# Patient Record
Sex: MALE | Race: WHITE | NOT HISPANIC OR LATINO | Employment: OTHER | URBAN - METROPOLITAN AREA
[De-identification: names, ages, dates, MRNs, and addresses within clinical notes are randomized per-mention and may not be internally consistent; named-entity substitution may affect disease eponyms.]

---

## 2023-03-13 LAB
ALANINE AMINOTRANSFERASE (SGPT) (U/L) IN SER/PLAS: 60 U/L (ref 10–52)
ALBUMIN (G/DL) IN SER/PLAS: 4.4 G/DL (ref 3.4–5)
ALBUMIN (MG/L) IN URINE: <7 MG/L
ALBUMIN/CREATININE (UG/MG) IN URINE: NORMAL UG/MG CRT (ref 0–30)
ALKALINE PHOSPHATASE (U/L) IN SER/PLAS: 123 U/L (ref 33–120)
ANION GAP IN SER/PLAS: 13 MMOL/L (ref 10–20)
APPEARANCE, URINE: NORMAL
ASPARTATE AMINOTRANSFERASE (SGOT) (U/L) IN SER/PLAS: 35 U/L (ref 9–39)
BASOPHILS (10*3/UL) IN BLOOD BY AUTOMATED COUNT: 0.06 X10E9/L (ref 0–0.1)
BASOPHILS/100 LEUKOCYTES IN BLOOD BY AUTOMATED COUNT: 1 % (ref 0–2)
BILIRUBIN DIRECT (MG/DL) IN SER/PLAS: 0.2 MG/DL (ref 0–0.3)
BILIRUBIN TOTAL (MG/DL) IN SER/PLAS: 0.9 MG/DL (ref 0–1.2)
BILIRUBIN, URINE: NEGATIVE
BLOOD, URINE: NEGATIVE
CALCIDIOL (25 OH VITAMIN D3) (NG/ML) IN SER/PLAS: 45 NG/ML
CALCIUM (MG/DL) IN SER/PLAS: 9.5 MG/DL (ref 8.6–10.6)
CARBON DIOXIDE, TOTAL (MMOL/L) IN SER/PLAS: 29 MMOL/L (ref 21–32)
CHLORIDE (MMOL/L) IN SER/PLAS: 103 MMOL/L (ref 98–107)
CHOLESTEROL (MG/DL) IN SER/PLAS: 178 MG/DL (ref 0–199)
CHOLESTEROL IN HDL (MG/DL) IN SER/PLAS: 66.4 MG/DL
CHOLESTEROL/HDL RATIO: 2.7
COLOR, URINE: YELLOW
CREATININE (MG/DL) IN SER/PLAS: 0.72 MG/DL (ref 0.5–1.3)
CREATININE (MG/DL) IN URINE: 39.2 MG/DL (ref 20–370)
EOSINOPHILS (10*3/UL) IN BLOOD BY AUTOMATED COUNT: 0.17 X10E9/L (ref 0–0.7)
EOSINOPHILS/100 LEUKOCYTES IN BLOOD BY AUTOMATED COUNT: 2.9 % (ref 0–6)
ERYTHROCYTE DISTRIBUTION WIDTH (RATIO) BY AUTOMATED COUNT: 12.3 % (ref 11.5–14.5)
ERYTHROCYTE MEAN CORPUSCULAR HEMOGLOBIN CONCENTRATION (G/DL) BY AUTOMATED: 34 G/DL (ref 32–36)
ERYTHROCYTE MEAN CORPUSCULAR VOLUME (FL) BY AUTOMATED COUNT: 95 FL (ref 80–100)
ERYTHROCYTES (10*6/UL) IN BLOOD BY AUTOMATED COUNT: 4.65 X10E12/L (ref 4.5–5.9)
ESTIMATED AVERAGE GLUCOSE FOR HBA1C: 120 MG/DL
GAMMA GLUTAMYL TRANSFERASE (U/L) IN SER/PLAS: 26 U/L (ref 5–64)
GFR MALE: >90 ML/MIN/1.73M2
GLUCOSE (MG/DL) IN SER/PLAS: 98 MG/DL (ref 74–99)
GLUCOSE, URINE: NEGATIVE MG/DL
HEMATOCRIT (%) IN BLOOD BY AUTOMATED COUNT: 44.1 % (ref 41–52)
HEMOGLOBIN (G/DL) IN BLOOD: 15 G/DL (ref 13.5–17.5)
HEMOGLOBIN A1C/HEMOGLOBIN TOTAL IN BLOOD: 5.8 %
IGE (IU/L) IN SERUM OR PLASMA: 11 IU/ML (ref 0–214)
IMMATURE GRANULOCYTES/100 LEUKOCYTES IN BLOOD BY AUTOMATED COUNT: 0.2 % (ref 0–0.9)
KETONES, URINE: NEGATIVE MG/DL
LACTATE DEHYDROGENASE (U/L) IN SER/PLAS BY LAC->PYR RXN: 157 U/L (ref 84–246)
LDL: 93 MG/DL (ref 0–99)
LEUKOCYTE ESTERASE, URINE: NEGATIVE
LEUKOCYTES (10*3/UL) IN BLOOD BY AUTOMATED COUNT: 5.8 X10E9/L (ref 4.4–11.3)
LYMPHOCYTES (10*3/UL) IN BLOOD BY AUTOMATED COUNT: 2 X10E9/L (ref 1.2–4.8)
LYMPHOCYTES/100 LEUKOCYTES IN BLOOD BY AUTOMATED COUNT: 34.4 % (ref 13–44)
MONOCYTES (10*3/UL) IN BLOOD BY AUTOMATED COUNT: 0.34 X10E9/L (ref 0.1–1)
MONOCYTES/100 LEUKOCYTES IN BLOOD BY AUTOMATED COUNT: 5.8 % (ref 2–10)
NEUTROPHILS (10*3/UL) IN BLOOD BY AUTOMATED COUNT: 3.24 X10E9/L (ref 1.2–7.7)
NEUTROPHILS/100 LEUKOCYTES IN BLOOD BY AUTOMATED COUNT: 55.7 % (ref 40–80)
NITRITE, URINE: NEGATIVE
NRBC (PER 100 WBCS) BY AUTOMATED COUNT: 0 /100 WBC (ref 0–0)
PH, URINE: 8 (ref 5–8)
PLATELETS (10*3/UL) IN BLOOD AUTOMATED COUNT: 196 X10E9/L (ref 150–450)
POTASSIUM (MMOL/L) IN SER/PLAS: 4.2 MMOL/L (ref 3.5–5.3)
PROTEIN TOTAL: 6.9 G/DL (ref 6.4–8.2)
PROTEIN, URINE: NEGATIVE MG/DL
SODIUM (MMOL/L) IN SER/PLAS: 141 MMOL/L (ref 136–145)
SPECIFIC GRAVITY, URINE: 1.01 (ref 1–1.03)
TRIGLYCERIDE (MG/DL) IN SER/PLAS: 93 MG/DL (ref 0–149)
UREA NITROGEN (MG/DL) IN SER/PLAS: 24 MG/DL (ref 6–23)
UROBILINOGEN, URINE: <2 MG/DL (ref 0–1.9)
VLDL: 19 MG/DL (ref 0–40)

## 2023-03-16 LAB — RESPIRATORY CULTURE, CYSTIC FIBROSIS: NORMAL

## 2023-03-17 LAB
VITAMIN A (RETINOL): 0.6 MG/L (ref 0.3–1.2)
VITAMIN A (RETINYL PALMITATE): 0.05 MG/L (ref 0–0.1)
VITAMIN A, INTERPRETATION: NORMAL
VITAMIN E (ALPHA-TOCOPHEROL): 13.5 MG/L (ref 5.5–18)
VITAMIN E (GAMMA-TOCOPHEROL): 1 MG/L (ref 0–6)

## 2023-04-03 LAB
FUNGAL CULTURE/SMEAR: NORMAL
FUNGAL SMEAR: NORMAL

## 2023-06-15 LAB — RESPIRATORY CULTURE, CYSTIC FIBROSIS: NORMAL

## 2023-07-03 LAB
FUNGAL CULTURE/SMEAR: NORMAL
FUNGAL SMEAR: NORMAL

## 2023-10-12 DIAGNOSIS — K21.9 GASTROESOPHAGEAL REFLUX DISEASE, UNSPECIFIED WHETHER ESOPHAGITIS PRESENT: ICD-10-CM

## 2023-10-12 RX ORDER — OMEPRAZOLE 40 MG/1
40 CAPSULE, DELAYED RELEASE ORAL DAILY
Qty: 30 CAPSULE | Refills: 11 | Status: SHIPPED | OUTPATIENT
Start: 2023-10-12 | End: 2023-12-05 | Stop reason: SDUPTHER

## 2023-10-20 ENCOUNTER — SPECIALTY PHARMACY (OUTPATIENT)
Dept: PHARMACY | Facility: CLINIC | Age: 43
End: 2023-10-20

## 2023-10-20 ENCOUNTER — PHARMACY VISIT (OUTPATIENT)
Dept: PHARMACY | Facility: CLINIC | Age: 43
End: 2023-10-20
Payer: MEDICARE

## 2023-10-20 PROCEDURE — RXMED WILLOW AMBULATORY MEDICATION CHARGE

## 2023-10-27 ENCOUNTER — TELEPHONE (OUTPATIENT)
Dept: PEDIATRIC PULMONOLOGY | Facility: HOSPITAL | Age: 43
End: 2023-10-27
Payer: MEDICARE

## 2023-10-27 NOTE — TELEPHONE ENCOUNTER
Moises Chuckieremington would like to get his GT removed.  He hasn't used it in a long while and it's to the point where it is just irritating him (the skin around it cycles from being raw to burning and itching to healing, then back to being raw.  As long as you are okay with that, I can put the referral to Surg to get it removed.

## 2023-10-27 NOTE — TELEPHONE ENCOUNTER
Pulmonary Attending    G-tube removal is reasonable. Froy told me that he only kept it as a nutritional insurance policy after starting Trikafta. His BMI is essentially at goal for adult male with CF and has been stable.  Wait to hear from Dr. Johnson.    Duke Chatman MD  10/27/2023  2:21 PM

## 2023-10-30 NOTE — TELEPHONE ENCOUNTER
Spoke to Moises and re-iterated Dr. Johnson concerns about weight gain and recent use of his GT.  Discussed going without tube feedings until next scheduled appt on 12/6/23 and then she can compare his last week in September to his weight in December.  She could also remove his GT in clinic - no need for surgeon - if that is what would be in his best interest.  Moises verbalized understanding and agreed with plan.

## 2023-10-30 NOTE — TELEPHONE ENCOUNTER
"PASTED FROM SECURE MESSAGE FROM CITLALI    According to my note from June, he was still doing the tube feedings, so at max, it's been 3.5 months that he stopped doing tube feedings. I would say 6 months of no tube feedings and stable weight would be preferred to remove the feeding tube. We do have a September weight for him, which is actually up from his June weight. I would want to know a better time frame of when he actually stopped tube feedings before making any decisions.     PASTED FROM SECURE MESSAGE FROM CECILIO    I just spoke to Moises. The last time he did tube feeds was in September - really only to try to get rid of the supply that he has... I expressed your concern that you would like to see him go 6 months without a GT feed and no weight loss before agreeing that it would be okay to get it removed. He was not very happy with that since he said he talked about getting rid of it at his last appt with Citlali in March, and again, he only does tube feeds mainly because he has so much supply and is trying to get rid if it... He has been doing things to protect his skin that isn't really working.     PASTED FROM SECURE MESSAGE FROM CITLALI  this is from my last note from March \"He wanted to see how things would go with just taking Boost without TF's, but he lost a few pounds and has resumed TFs last week with improvement of weight. He has been using 4 cans Nutren 2.0 per night. Noticed weight dropped down to 145 last week. Prior to that, had not used PEG for about 3 months.\" So he went ok for awhile without using it. Vaseline would be ok to help protect the skin if he hasn't tried that. When is he due to come in again (so we would have another weight at least)? He is also due for colonoscopy unless he got it done elsewhere. Could give the prep through the PEG tube.              11:58  LC  Also I think this is removal in a clinic visit (does not need to see a surgeon) if that is what ends up happeneing   "

## 2023-11-15 ENCOUNTER — PHARMACY VISIT (OUTPATIENT)
Dept: PHARMACY | Facility: CLINIC | Age: 43
End: 2023-11-15
Payer: MEDICARE

## 2023-11-15 PROCEDURE — RXMED WILLOW AMBULATORY MEDICATION CHARGE

## 2023-11-17 ENCOUNTER — SPECIALTY PHARMACY (OUTPATIENT)
Dept: PHARMACY | Facility: CLINIC | Age: 43
End: 2023-11-17

## 2023-12-05 ENCOUNTER — OFFICE VISIT (OUTPATIENT)
Dept: DERMATOLOGY | Facility: CLINIC | Age: 43
End: 2023-12-05
Payer: MEDICARE

## 2023-12-05 DIAGNOSIS — Z12.83 SKIN CANCER SCREENING: Primary | ICD-10-CM

## 2023-12-05 DIAGNOSIS — D48.5 NEOPLASM OF UNCERTAIN BEHAVIOR OF SKIN: ICD-10-CM

## 2023-12-05 DIAGNOSIS — D22.9 NEVUS: ICD-10-CM

## 2023-12-05 DIAGNOSIS — L81.4 LENTIGO: ICD-10-CM

## 2023-12-05 PROBLEM — D68.4: Status: ACTIVE | Noted: 2023-12-05

## 2023-12-05 PROBLEM — E55.9 VITAMIN D DEFICIENCY: Status: ACTIVE | Noted: 2023-12-05

## 2023-12-05 PROBLEM — Z93.1 GASTROSTOMY STATUS (MULTI): Status: ACTIVE | Noted: 2023-12-05

## 2023-12-05 PROBLEM — Z00.00 HEALTHCARE MAINTENANCE: Status: ACTIVE | Noted: 2023-12-05

## 2023-12-05 PROBLEM — R73.02 IMPAIRED GLUCOSE TOLERANCE: Status: ACTIVE | Noted: 2023-12-05

## 2023-12-05 PROBLEM — H90.A22 SENSORINEURAL HEARING LOSS (SNHL) OF LEFT EAR WITH RESTRICTED HEARING OF RIGHT EAR: Status: ACTIVE | Noted: 2023-12-05

## 2023-12-05 PROBLEM — E84.8 EXOCRINE PANCREATIC MANIFESTATION OF CYSTIC FIBROSIS (MULTI): Status: ACTIVE | Noted: 2023-12-05

## 2023-12-05 PROBLEM — M85.80 OSTEOPENIA: Status: ACTIVE | Noted: 2023-12-05

## 2023-12-05 PROBLEM — K21.9 GERD (GASTROESOPHAGEAL REFLUX DISEASE): Status: ACTIVE | Noted: 2023-12-05

## 2023-12-05 PROBLEM — E84.0 CYSTIC FIBROSIS WITH PULMONARY MANIFESTATIONS (MULTI): Status: ACTIVE | Noted: 2023-12-05

## 2023-12-05 PROCEDURE — 88305 TISSUE EXAM BY PATHOLOGIST: CPT | Mod: TC,DER | Performed by: NURSE PRACTITIONER

## 2023-12-05 PROCEDURE — 99203 OFFICE O/P NEW LOW 30 MIN: CPT | Performed by: NURSE PRACTITIONER

## 2023-12-05 PROCEDURE — 88305 TISSUE EXAM BY PATHOLOGIST: CPT | Performed by: DERMATOLOGY

## 2023-12-05 RX ORDER — SODIUM CHLORIDE FOR INHALATION 3 %
4 VIAL, NEBULIZER (ML) INHALATION
COMMUNITY
Start: 2018-09-12 | End: 2023-12-06 | Stop reason: WASHOUT

## 2023-12-05 RX ORDER — PEDI MULTIVIT NO.128/VITAMIN K 500 MCG/ML
2 LIQUID (ML) ORAL DAILY
COMMUNITY

## 2023-12-05 RX ORDER — FLUTICASONE PROPIONATE AND SALMETEROL 250; 50 UG/1; UG/1
1 POWDER RESPIRATORY (INHALATION)
COMMUNITY
Start: 2015-03-04 | End: 2023-12-06 | Stop reason: ALTCHOICE

## 2023-12-05 RX ORDER — PANCRELIPASE LIPASE, PANCRELIPASE PROTEASE, PANCRELIPASE AMYLASE 20000; 63000; 84000 [USP'U]/1; [USP'U]/1; [USP'U]/1
CAPSULE, DELAYED RELEASE ORAL
Start: 2023-12-05 | End: 2024-01-29 | Stop reason: SDUPTHER

## 2023-12-05 RX ORDER — MV-MIN 51/FOLIC ACID/VIT K/UBI 100-350MCG
1 TABLET,CHEWABLE ORAL DAILY
COMMUNITY
Start: 2013-10-12 | End: 2023-12-06 | Stop reason: WASHOUT

## 2023-12-05 RX ORDER — ALBUTEROL SULFATE 90 UG/1
2 AEROSOL, METERED RESPIRATORY (INHALATION) EVERY 6 HOURS PRN
COMMUNITY
Start: 2017-03-08 | End: 2024-03-11 | Stop reason: WASHOUT

## 2023-12-05 RX ORDER — AZITHROMYCIN 250 MG/1
250 TABLET, FILM COATED ORAL DAILY
COMMUNITY
End: 2023-12-06 | Stop reason: SDUPTHER

## 2023-12-05 RX ORDER — OMEPRAZOLE 40 MG/1
1 CAPSULE, DELAYED RELEASE ORAL
COMMUNITY
Start: 2014-12-11 | End: 2024-01-29 | Stop reason: SDUPTHER

## 2023-12-05 RX ORDER — OSELTAMIVIR PHOSPHATE 75 MG/1
75 CAPSULE ORAL EVERY 12 HOURS
COMMUNITY
Start: 2018-11-21 | End: 2023-12-06 | Stop reason: SDUPTHER

## 2023-12-05 NOTE — PROGRESS NOTES
Subjective     Froy Parisi is a 43 y.o. male who presents for the following: waist up skin exam .   New patient visit patient in for waist up skin exam patient states he was seen by his primary and primary noticed moles they would like dermatology to look at further.  Patient states he did have 1 scaly itchy area on his back that he has scratched and has since gone away.  Patient has no other concerns    Review of Systems:  No other skin or systemic complaints other than what is documented elsewhere in the note.    The following portions of the chart were reviewed this encounter and updated as appropriate:       Skin Cancer History  No skin cancer on file.    Specialty Problems    None    Past Medical History:  Froy Parisi  has a past medical history of Influenza due to other identified influenza virus with other respiratory manifestations (11/08/2016) and Other specified respiratory disorders (10/18/2021).    Past Surgical History:  Froy Parisi  has a past surgical history that includes Gastrostomy tube placement (08/15/2018).    Family History:  Patient family history is not on file.    Social History:  Froy Parisi  has no history on file for tobacco use, alcohol use, and drug use.    Allergies:  Patient has no known allergies.    Current Medications / CAM's:    Current Outpatient Medications:     elexacaftor-tezacaftor-ivacaft (Trikafta) 100-50-75 mg tablet, TAKE TWO (2) ORANGE TABLETS BY MOUTH IN THE MORNING AND ONE (1) BLUE TABLET BY MOUTH IN THE EVENING. TAKE 12 HOURS APART WITH FATTY FOODS., Disp: 84 each, Rfl: 11    omeprazole (PriLOSEC) 40 mg DR capsule, Take 1 capsule (40 mg) by mouth once daily. Do not crush or chew., Disp: 30 capsule, Rfl: 11     Objective   Well appearing patient in no apparent distress; mood and affect are within normal limits.    Assessment/Plan   1. Skin cancer screening    The patient presented for a routine skin examination today. There are no specific  concerns regarding skin health and no new or changing moles, lesions, or rashes.     Assessment: Based on the comprehensive skin examination, there were no concerning or abnormal findings. The patient's skin appeared to be in good health, without any notable dermatologic conditions or lesions.    Plan: Given the absence of any significant skin findings, no specific interventions or treatments are warranted at this time. The patient was educated on the importance of regular skin self-examinations and advised to promptly report any changes or concerns. Routine follow-up for a skin examination was recommended.    Suspicious lesions biopsied today.    Discussed/information given on safe sun practices and use of sunscreen, sun protective clothing or sun avoidance. Recommend to use OTC medication of sunscreen SPF 30 or higher on a daily basis prior to sun exposure to reduce the risk of skin cancer.    Contact Office if: Any lesions change in size, shape or color; itch, bum or bleed.        2. Nevus  Multiple benign appearing flesh colored to pigmented macules and papules     Plan: Counseling.  I counseled the patient regarding the following:  Instructions: Monthly self-skin checks to monitor for any changes in moles are recommended. Expectations: Benign Nevi are pigmented nests of cells within the skin.No treatment is necessary. Contact Office if: Any moles change in size, shape or color; itch, bum or bleed.    3. Lentigo  Benign pigmented macules appearing in sun exposed areas     Solar lentigo (a type of lentigo also known as a senile lentigo, age spot, or liver spot) is a benign pigmented macule appearing on fair-skinned individuals that is related to ultraviolet radiation (UVR) exposure, typically from the sun.     PLAN:  Limiting sun exposure through avoidance, protective clothing, and use of sunscreens can help prevent the appearance of solar lentigines.    If lesion changes or becomes symptomatic she should return  to clinic    4. Neoplasm of uncertain behavior of skin  Left Lower Back              Lesion biopsy  Type of biopsy: tangential    Informed consent: discussed and consent obtained    Timeout: patient name, date of birth, surgical site, and procedure verified    Procedure prep:  Patient was prepped and draped  Anesthesia: the lesion was anesthetized in a standard fashion    Anesthetic:  1% lidocaine w/ epinephrine 1-100,000 local infiltration  Instrument used: DermaBlade    Hemostasis achieved with: aluminum chloride    Outcome: patient tolerated procedure well    Post-procedure details: sterile dressing applied and wound care instructions given    Dressing type: petrolatum and bandage      Staff Communication: Dermatology Local Anesthesia: 1 % Lidocaine / Epinephrine - Amount: 2ml    Specimen 1 - Dermatopathology- DERM LAB  Differential Diagnosis: DPN vs. other  Check Margins Yes/No?:    Comments:    Dermpath Lab: Routine Histopathology (formalin-fixed tissue)

## 2023-12-06 ENCOUNTER — MULTIDISCIPLINARY VISIT (OUTPATIENT)
Dept: PEDIATRIC PULMONOLOGY | Facility: HOSPITAL | Age: 43
End: 2023-12-06
Payer: MEDICARE

## 2023-12-06 ENCOUNTER — MULTIDISCIPLINARY VISIT (OUTPATIENT)
Dept: GASTROENTEROLOGY | Facility: HOSPITAL | Age: 43
End: 2023-12-06
Payer: MEDICARE

## 2023-12-06 ENCOUNTER — HOSPITAL ENCOUNTER (OUTPATIENT)
Dept: RESPIRATORY THERAPY | Facility: HOSPITAL | Age: 43
Discharge: HOME | End: 2023-12-06
Payer: MEDICARE

## 2023-12-06 ENCOUNTER — NUTRITION (OUTPATIENT)
Dept: PEDIATRIC PULMONOLOGY | Facility: HOSPITAL | Age: 43
End: 2023-12-06

## 2023-12-06 VITALS
WEIGHT: 148.26 LBS | DIASTOLIC BLOOD PRESSURE: 66 MMHG | RESPIRATION RATE: 20 BRPM | HEIGHT: 69 IN | TEMPERATURE: 98.3 F | SYSTOLIC BLOOD PRESSURE: 103 MMHG | BODY MASS INDEX: 21.96 KG/M2 | HEART RATE: 66 BPM | OXYGEN SATURATION: 95 %

## 2023-12-06 DIAGNOSIS — K94.23: ICD-10-CM

## 2023-12-06 DIAGNOSIS — R63.0 DECREASED APPETITE: Primary | ICD-10-CM

## 2023-12-06 DIAGNOSIS — E84.0 CYSTIC FIBROSIS WITH PULMONARY MANIFESTATIONS (MULTI): Primary | ICD-10-CM

## 2023-12-06 DIAGNOSIS — E84.19 CYSTIC FIBROSIS WITH GASTROINTESTINAL MANIFESTATIONS (MULTI): ICD-10-CM

## 2023-12-06 DIAGNOSIS — Z93.1 GASTROSTOMY STATUS (MULTI): ICD-10-CM

## 2023-12-06 DIAGNOSIS — Z00.00 HEALTHCARE MAINTENANCE: ICD-10-CM

## 2023-12-06 DIAGNOSIS — E84.0 CYSTIC FIBROSIS WITH PULMONARY MANIFESTATIONS (MULTI): ICD-10-CM

## 2023-12-06 DIAGNOSIS — K21.9 GASTROESOPHAGEAL REFLUX DISEASE WITHOUT ESOPHAGITIS: ICD-10-CM

## 2023-12-06 DIAGNOSIS — D22.5 MELANOCYTIC NEVUS OF TRUNK: ICD-10-CM

## 2023-12-06 DIAGNOSIS — E84.8 EXOCRINE PANCREATIC MANIFESTATION OF CYSTIC FIBROSIS (MULTI): ICD-10-CM

## 2023-12-06 DIAGNOSIS — R63.8 DIFFICULTY MAINTAINING WEIGHT: ICD-10-CM

## 2023-12-06 DIAGNOSIS — K86.81 EXOCRINE PANCREATIC INSUFFICIENCY (HHS-HCC): ICD-10-CM

## 2023-12-06 DIAGNOSIS — A49.8 PSEUDOMONAS AERUGINOSA INFECTION: ICD-10-CM

## 2023-12-06 DIAGNOSIS — R73.02 IMPAIRED GLUCOSE TOLERANCE: ICD-10-CM

## 2023-12-06 DIAGNOSIS — Z23 NEED FOR ZOSTER VACCINATION: ICD-10-CM

## 2023-12-06 DIAGNOSIS — R79.89 ABNORMAL LFTS (LIVER FUNCTION TESTS): ICD-10-CM

## 2023-12-06 LAB
FEF 25-75: 2.79 L/S
FEV1/FVC: 73 %
FEV1: 3.53 LITERS
FVC: 4.81 LITERS
PEF: 6.79 L/S

## 2023-12-06 PROCEDURE — 90471 IMMUNIZATION ADMIN: CPT | Performed by: INTERNAL MEDICINE

## 2023-12-06 PROCEDURE — 99213 OFFICE O/P EST LOW 20 MIN: CPT | Performed by: INTERNAL MEDICINE

## 2023-12-06 PROCEDURE — 94010 BREATHING CAPACITY TEST: CPT

## 2023-12-06 PROCEDURE — 94010 BREATHING CAPACITY TEST: CPT | Performed by: INTERNAL MEDICINE

## 2023-12-06 PROCEDURE — 99215 OFFICE O/P EST HI 40 MIN: CPT | Performed by: INTERNAL MEDICINE

## 2023-12-06 PROCEDURE — 87070 CULTURE OTHR SPECIMN AEROBIC: CPT | Performed by: INTERNAL MEDICINE

## 2023-12-06 RX ORDER — OSELTAMIVIR PHOSPHATE 75 MG/1
75 CAPSULE ORAL EVERY 12 HOURS
Qty: 10 CAPSULE | Refills: 0 | Status: SHIPPED | OUTPATIENT
Start: 2023-12-06 | End: 2023-12-11

## 2023-12-06 RX ORDER — BUTALB/ACETAMINOPHEN/CAFFEINE 50-325-40
1 TABLET ORAL DAILY
COMMUNITY
Start: 2023-09-02 | End: 2023-12-06 | Stop reason: SDUPTHER

## 2023-12-06 RX ORDER — BUTALB/ACETAMINOPHEN/CAFFEINE 50-325-40
2 TABLET ORAL DAILY
Qty: 180 TABLET | Refills: 3 | Status: SHIPPED | OUTPATIENT
Start: 2023-12-06 | End: 2024-06-10 | Stop reason: SDUPTHER

## 2023-12-06 RX ORDER — AZITHROMYCIN 250 MG/1
250 TABLET, FILM COATED ORAL DAILY
Qty: 90 TABLET | Refills: 3 | Status: SHIPPED | OUTPATIENT
Start: 2023-12-06 | End: 2024-03-11 | Stop reason: ALTCHOICE

## 2023-12-06 NOTE — PATIENT INSTRUCTIONS
We will order a LEILANI-KEY 24 Fr 2.5 cm from [employer] so you can switch back to the other brand.  We will request additional Boost Plus for you as well.  Today we increased the balloon volume on your current tube to 12 mL total.    We have prescribed the calcium citrate to your pharmacy.

## 2023-12-06 NOTE — ASSESSMENT & PLAN NOTE
DEXA scan (11/28/2022): Lumbar spine Z/T score -2.4 (osteopenia)  Eligible for screening colonoscopy: Possible participation in [facility].  Influenza vaccination: 11/28/2023; has oseltamivir.  SARS CoV-2 vaccination: 3/26/2021, 2/23/2021  Shingrix: First injection given today in clinic; could wait until next 3-month appointment for second injection.

## 2023-12-06 NOTE — PATIENT INSTRUCTIONS
We have talked with Emily Nageotte, the dietitian, about the insurance coverage of Boost nutritional supplements.  If she does not see you today, she will call you back to troubleshoot.  Dr. Johnson will work on getting you the new PEG tube button.  Clarified your medication list to remove the Advair Diskus.  The FEV1 today was 3.53 L (88% predicted).  This is stable to somewhat better than values we saw earlier in the year.  I will let you know if we need to do anything about your throat swab culture results.  I refilled the azithromycin.  I refilled oseltamivir (Tamiflu).  Sick plan: If you have respiratory illness, will consider treatment with ciprofloxacin 750 mg by mouth twice daily for 14 days.  We will see you back in about 3 months to coincide with your follow-up appointment with Dr. Johnson.

## 2023-12-06 NOTE — PROGRESS NOTES
ESTABLISHED CF GI PATIENT NOTE    Froy Parisi  1980   [id number]     Chief Complaint:   Chief Complaint   Patient presents with    Gastrostomy Tube Follow Up    Cystic Fibrosis     Genotype [id number]/1898+1G->A        HPI:   Patient is a 43 y.o. year old male with a PMH significant for cystic fibrosis (genotype  K292gvr/1898+1G->A )    on elexacaftor/tezacaftor/ivacaftor here for discussion regarding gastrostomy tube.  Patient has been wanting to have tube removed due to leaking and has not been using the tube to see if his weight would remain stable off TFs.  Patient was last seen in CF GI clinic 3/12/23.  At that time planned to switch to AMT Mini One longer length (had 24 Fr 2.3 cm LEIALNI-KEY with plan to switch to AMT Mini One 24 Fr 2.5 cm) but had trouble getting the new tube.  Due to the delay, patient did not change out his tube to the AMT Mini One until September, but felt that the AMT was leaking even more than the LEILANI-KEY had.  He had some supplies sent to [place] by accident back in September.      Tried marinol for appetite but it didn't really work.  Also tried mirtazapine for 2 weeks but it made him dizzy.    Eats out a lot when he is staying in [place] because the other people he lives with (girlfriend's brother and mother) end up taking his food.  He has been taking Zenpep 5 with meals, 2-3 with snacks.  Has 1-2 snacks per day.  Eats at [employer], also gets snacks from [employer].    He has noticed that if he misses omeprazole for a day, he has more discomfort with leaking.  He usually has to change out a gauze twice a day due to leaking.  Has not used TFs for awhile due to wanting to see how his weight is.  He currently has 3 boxes of tube feeds but was hoping to have his weight up to 150 pounds.  Has one backup gastrostomy tube at home which is a LEILANI-KEY and he is not sure how old it was.      Past Medical History:  11/08/2016: Influenza due to other identified influenza virus with   other  respiratory manifestations      Comment:  Influenza A  10/18/2021: Other specified respiratory disorders      Comment:  Pseudomonas respiratory infection         Current Outpatient Medications:     albuterol (Ventolin HFA) 90 mcg/actuation inhaler, Inhale 2 puffs every 6 hours if needed., Disp: , Rfl:     azithromycin (Zithromax) 250 mg tablet, Take 1 tablet (250 mg) by mouth once daily., Disp: 90 tablet, Rfl: 3    calcium citrate-vitamin D3 (Citracal+D) 315 mg-5 mcg (200 unit) tablet, Take 2 tablets by mouth once daily., Disp: 180 tablet, Rfl: 3    DEKAs Plus, folic acid, 200 mcg-1,000 mcg-10 mg capsule, Take 2 capsules by mouth once daily., Disp: , Rfl:     elexacaftor-tezacaftor-ivacaft (Trikafta) 100-50-75 mg tablet, TAKE TWO (2) ORANGE TABLETS BY MOUTH IN THE MORNING AND ONE (1) BLUE TABLET BY MOUTH IN THE EVENING. TAKE 12 HOURS APART WITH FATTY FOODS., Disp: 84 each, Rfl: 11    lipase-protease-amylase (Zenpep) 20,000-63,000- 84,000 unit capsule, Take 6 capsules by mouth 3 times a day with meals. May also take 3 capsules with snacks for snacks., Disp: , Rfl:     omeprazole (PriLOSEC) 40 mg DR capsule, Take 1 capsule (40 mg) by mouth once daily in the morning. Take before meals., Disp: , Rfl:     Past Medical History:  Past Medical History:   Diagnosis Date    Influenza due to other identified influenza virus with other respiratory manifestations 11/08/2016    Influenza A    Other specified respiratory disorders 10/18/2021    Pseudomonas respiratory infection       Past Surgical History:  Past Surgical History:   Procedure Laterality Date    GASTROSTOMY TUBE PLACEMENT  08/15/2018    Percutaneous Placement Of Gastrostomy Tube       Family History:  No family history on file.     Vitals      2/21/2022    10:27 AM 5/9/2022     9:43 AM 9/12/2022    12:54 PM 11/28/2022    12:34 PM 3/13/2023    11:09 AM 6/12/2023    10:38 AM 12/6/2023     2:17 PM   Vitals   Systolic 126 119 107 111 122 119 103   Diastolic 76 62 66 72  "86 77 66   Heart Rate 78 62 59 69 70 67 66   Temp 36.8 °C (98.2 °F) 36.7 °C (98.1 °F) 36.8 °C (98.2 °F) 36.2 °C (97.2 °F) 36.9 °C (98.5 °F) 36.8 °C (98.3 °F) 36.8 °C (98.3 °F)   Resp 20 17 18 18 17 18 20   Height (in) 1.751 m (5' 8.94\") 1.751 m (5' 8.94\") 1.751 m (5' 8.94\")  1.751 m (5' 8.94\") 1.751 m (5' 8.94\") 1.75 m (5' 8.9\")   Weight (lb) 153.55 156.97 152.23 149.47 151.35 151.9 148.26   BMI 22.72 kg/m2 23.22 kg/m2 22.52 kg/m2 22.11 kg/m2 22.39 kg/m2 22.47 kg/m2 21.96 kg/m2   BSA (m2) 1.84 m2 1.86 m2 1.83 m2 1.82 m2 1.83 m2 1.83 m2 1.81 m2       Physical Exam:  NAD  Oropharynx clear  Lungs CTA bilaterally  No neck lymphadenopathy  RRR S1 S2  Abd soft, NT ND, multiple well-healed scars  AMT Mini-One 24 Fr 2.5 cm with bumper lightly touching the skin.  Mild erythema 3-4 mm around the site.  Scant drainage on gauze.  Balloon volume checked and found to be 8.5 mL --> increased to 12 Ml total by adding sterile water.  Labs:    WBC   Date/Time Value Ref Range Status   03/13/2023 12:35 PM 5.8 4.4 - 11.3 x10E9/L Final     Hemoglobin   Date/Time Value Ref Range Status   03/13/2023 12:35 PM 15.0 13.5 - 17.5 g/dL Final     Hematocrit   Date/Time Value Ref Range Status   03/13/2023 12:35 PM 44.1 41.0 - 52.0 % Final     MCV   Date/Time Value Ref Range Status   03/13/2023 12:35 PM 95 80 - 100 fL Final     Platelets   Date/Time Value Ref Range Status   03/13/2023 12:35  150 - 450 x10E9/L Final        Sodium   Date/Time Value Ref Range Status   03/13/2023 12:35  136 - 145 mmol/L Final     Potassium   Date/Time Value Ref Range Status   03/13/2023 12:35 PM 4.2 3.5 - 5.3 mmol/L Final     Chloride   Date/Time Value Ref Range Status   03/13/2023 12:35  98 - 107 mmol/L Final     Urea Nitrogen   Date/Time Value Ref Range Status   03/13/2023 12:35 PM 24 (H) 6 - 23 mg/dL Final     Creatinine   Date/Time Value Ref Range Status   03/13/2023 12:35 PM 0.72 0.50 - 1.30 mg/dL Final     Glucose   Date/Time Value Ref Range " Status   03/13/2023 12:35 PM 98 74 - 99 mg/dL Final       AST   Date/Time Value Ref Range Status   03/13/2023 12:35 PM 35 9 - 39 U/L Final     ALT (SGPT)   Date/Time Value Ref Range Status   03/13/2023 12:35 PM 60 (H) 10 - 52 U/L Final     Comment:      Patients treated with Sulfasalazine may generate    falsely decreased results for ALT.       Alkaline Phosphatase   Date/Time Value Ref Range Status   03/13/2023 12:35  (H) 33 - 120 U/L Final     Total Bilirubin   Date/Time Value Ref Range Status   03/13/2023 12:35 PM 0.9 0.0 - 1.2 mg/dL Final     Bilirubin, Direct   Date/Time Value Ref Range Status   03/13/2023 12:35 PM 0.2 0.0 - 0.3 mg/dL Final     GGT   Date/Time Value Ref Range Status   03/13/2023 12:35 PM 26 5 - 64 U/L Final     Albumin   Date/Time Value Ref Range Status   03/13/2023 12:35 PM 4.4 3.4 - 5.0 g/dL Final     Total Protein   Date/Time Value Ref Range Status   03/13/2023 12:35 PM 6.9 6.4 - 8.2 g/dL Final        Protime   Date/Time Value Ref Range Status   09/12/2022 02:40 PM 11.3 9.8 - 13.4 sec Final     INR   Date/Time Value Ref Range Status   09/12/2022 02:40 PM 1.0 0.9 - 1.1 Final        Vitamin D, 25-Hydroxy   Date/Time Value Ref Range Status   03/13/2023 12:35 PM 45 ng/mL Final     Comment:     .  DEFICIENCY:         < 20   NG/ML  INSUFFICIENCY:      20-29  NG/ML  SUFFICIENCY:         NG/ML    THIS ASSAY ACCURATELY QUANTIFIES THE SUM OF  VITAMIN D3, 25-HYDROXY AND VIT D2,25-HYDROXY.          Vitamin D, 25-Hydroxy   Date/Time Value Ref Range Status   03/13/2023 12:35 PM 45 ng/mL Final     Comment:     .  DEFICIENCY:         < 20   NG/ML  INSUFFICIENCY:      20-29  NG/ML  SUFFICIENCY:         NG/ML    THIS ASSAY ACCURATELY QUANTIFIES THE SUM OF  VITAMIN D3, 25-HYDROXY AND VIT D2,25-HYDROXY.       Vitamin A (Retinol)   Date/Time Value Ref Range Status   03/13/2023 12:35 PM 0.60 0.30 - 1.20 mg/L Final     Vitamin A, Interpretation   Date/Time Value Ref Range Status   03/13/2023  12:35 PM Normal  Final     Comment:     This test was developed and its performance characteristics   determined by [facility]. It has not been cleared or   approved by the [place] Food and Drug Administration. This test was   performed in a CLIA certified laboratory and is intended for   clinical purposes.  Performed By: [facility]  [address] [place] [id number]  : Henrry Chapa MD, PhD       Vitamin E (Alpha-Tocopherol)   Date/Time Value Ref Range Status   2023 12:35 PM 13.5 5.5 - 18.0 mg/L Final     Comment:     This test was developed and its performance characteristics   determined by [facility]. It has not been cleared or   approved by the US Food and Drug Administration. This test was   performed in a CLIA certified laboratory and is intended for   clinical purposes.       Vitamin E (Gamma-Tocopherol)   Date/Time Value Ref Range Status   2023 12:35 PM 1.0 0.0 - 6.0 mg/L Final     Comment:     Performed By: [facility]  [address] [place] [id number]  : Henrry Chapa MD, PhD        Ferritin   Date/Time Value Ref Range Status   2022 02:40 PM 63 20 - 300 ug/L Final     Iron   Date/Time Value Ref Range Status   2022 02:40  35 - 150 ug/dL Final     TIBC   Date/Time Value Ref Range Status   2022 02:40  240 - 445 ug/dL Final     Iron Saturation   Date/Time Value Ref Range Status   2022 02:40 PM 28 25 - 45 % Final     14 PANCREATIC ELASTASE,FECAL L 24 ug/g     Imagin11 Gastric emptying study    FINDINGS: The image series shows immediate accumulation of activity   within the stomach fundus with subsequent activity seen in the antrum   and subsequently small bowel. Computer quantification demonstrates   22% retention at one hour (normal < 90% and > 30%), 11% retention at   2 hours (normal < 60%), 5% retention at 3 hours (normal < 30%), and   3% gastric retention at  4 hours (normal < 10% ).     IMPRESSION:   Rapid gastric emptying times with minimal activity remaining in the   gastric antrum at 4 hours.    4/19/21 Abdominal ultrasound with Dopplers  FINDINGS:  LIVER:  The liver is normal in size, 14.8 cm in maximum dimension. The  hepatic parenchyma is homogeneous and demonstrates an expected  echotexture. No focal hepatic lesions are identified.     GALLBLADDER:  The gallbladder is nondistended, and demonstrates no evidence of  gallstones, wall thickening or surrounding fluid. Sonographic  Cui's sign is reportedly negative.     BILIARY SYSTEM:  No evidence of intra or extrahepatic biliary dilatation is  identified; the common bile duct measures 2 mm.     DOPPLER EVALUATION:     HEPATIC ARTERIES:  Hepatic artery and its right and left branches RI's are estimated at  0.71, 0.72 and 0.66, respectively.     PORTAL VEIN:  Portal vein is patent and measures 9 mm. There is normal respiratory  variation. Portal vein velocities are calculated as follows: main  portal vein 30.5 cm/sec, antegrade flow; left portal vein branch 14.9  cm/sec, antegrade flow; right portal vein anterior branch 17.9  cm/sec, antegrade flow; right portal vein posterior branch 24.3  cm/sec, antegrade flow. The splenic vein is also patent.     HEPATIC VEIN:  The right, middle and left hepatic veins are patent and demonstrate  triphasic antegrade flow. IVC appears also patent.     PANCREAS:  The visualized pancreas is unremarkable in appearance.     RIGHT KIDNEY:  The right kidney measures 11.1 cm in length. No hydronephrosis or  renal calculi are seen.     SPLEEN:  The spleen measures 9.8 cm in length and is grossly unremarkable.     PERITONEUM AND RETROPERITONEUM:  There is no free or loculated fluid seen in the abdomen.     IMPRESSION:  Unremarkable ultrasound of the liver including Doppler the evaluation.  Previous Endoscopic Evaluation:  7/12/18 Colonoscopy - BBPS 9; internal hemorrhoids      Assessment  and Plan:  Patient is a 43 y.o. year old male with CF (genotype [id number]/1898+1G->A) on elexacaftor/tezacaftor/ivacaftor, h/o gastrostomy tube, exocrine pancreatic insufficiency here for followup.  Patient has been having issues with gastrostomy tube leaking and has wanted to have tube removed.  Weight today is down 2-3 pounds from June however.     Gastrostomy tube - Discussed with patient his desire for removal of tube.  Patient feels that it would be acceptable to have the tube if it wasn't leaking.  He understands that he is at higher risk for having a gastrocutaneous fistula if it is removed, because he has had a gastrostomy tube since 2007.  On review of prior notes, it appears he periodically uses the tube for feeding if his weight drops.  Will try switching back to the LEILANI-KEY brand tube 24 Fr 2.5 cm.  Increased intragastric balloon volume to 12 mL today to try to reduce leaking.  Backup tube is LEILANI-KEY but likely 2.3 cm (too short).  Decreased appetite - Spoke with dietitian Emily Nageotte - will arrange supplements (Boost Plus) for patient.  Patient's decreased weight is likely multifactorial including contribution from his living situation (concern that household members will take his food).  Exocrine pancreatic insufficiency - Current dose of Zenpep 5 with meals provides  1486 units lipase/kg/meal.  Increased risk CRC in CF - Patient is due for colonoscopy (last one 2018).  Will need to readdress at next visit.  GERD - Continue omeprazole.  Calcium citrate prescribed for supplementation due to acid suppression.  Abnormal LFTs - APRI 0.46 based on March 2023 labs.  Last ultrasound in 2021 showed no evidence of cirrhosis.  Follow up ultrasound was ordered March 2023 but has not been done.  Discuss at followup visit.      Follow up in 3 months.    Aleena Johnson MD, MS  Gastroenterologist

## 2023-12-06 NOTE — PROGRESS NOTES
[facility] M.D. 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 Pre-Trikafta ppFEV1 in mid 60% decile. Post Trikafta ppFEV1 in the mid 80% decile. Last hospitalization and in August 2018. Historically, difficulty maintaining adequate BMI, has feeding tube. Pre-Trikafta BMI 17-19 kg/m². Post Trikafta BMI stable at around 22.5 kg/m². Last Pseudomonas aeruginosa (November 2016). Last MRSA (March 2016). Occasional Aspergillus fumigatus.     CF complications: EPI on PERT (has Relizorb for tube feeding); GERD controlled with omeprazole; suspicion for CF liver disease (Dr. Johnson, March 2023), wanted to get repeat RUQ ultrasound with duplex; osteopenia on DEXA in September 2022; (+) 2-hour OGTT on 3/27/2019 (baseline 97 mg/dL, 2-hour 219) but endocrinology considers him to have impaired glucose tolerance.    HPI (12/6/2023):  Not doing tube feeding.  Tube leaks, would like to get a new button.  Trying to maintain and gain weight using oral supplements but problems obtaining them. No interval need for antibiotics.  Feels like his respiratory status is good. Not doing aerosols or chest physiotherapy.  Generally not coughing or expectorating sputum.  Denies any side effects associated with Trikafta.  Went to dermatology and had shave biopsy of pigmented lesion of lower back on left.    HPI (9/13/2023): First visit with Froy. Heretofore followed with Dr. Bravo, last visit on 6/12/2023. At that point, standard visit. No exacerbation. Doing tube feeding occasionally with Relizorb. Otherwise standard CF health measures. Since that time, he has done well without need for oral antibiotics. His girlfriend (Gauri) has a grandson in [place] with retinoblastoma, so they have been back and forth over past few months to support Gauri's son and daughter-in-law. Froy wonders whether he can/should have the VZV vaccine. He has  also had a spot on skin of back bleed after scratching. Wants MD to take a look.     Current Use of Health Management Strategies:    Respiratory Interventions  Albuterol: Does not use  Pulmozyme: Prescribed, does not use  Hypertonic saline: Does not use    HFCWO (percussive vest): Does not use  Oscillatory PEP: Does not use  Exercise: No Regular Exercise  LTE antagonist: No  Azithromycin: 250 mg daily  Aztreonam lysine ([place]): Does not use  Tobramycin: Does not use    Colistin: Does not use  Meropenem (inhaled): Does not use  Vancomycin (inhaled): Does not use  ICS: Does not use  ICS/LABA: Fluticasone-salmeterol (Advair HFA/Diskus, Breo Ellipta, AirDuo, Wixela Inhub) - often forgets to use  LAMA: No  CFTR modulator: Elexacaftor-Tezacaftor-Ivacaftor (Trikafta) Full-dose (2 orange tablets in AM, 1 blue tablet in PM)  High-dose ibuprofen: No   Oxygen: Does not use  Chronic oral antibiotics: Does not use     Digestive Health Interventions    Acid-suppressing medication?: Yes  Using CF vitamins?: Yes  Taking pancreatic enzymes?: Yes  Any diarrhea?: No  Any steatorrhea?: No  Any constipation?: No  Using laxatives?: No  Feeding tube?: Yes  Supplemental enteral nutrition?: No    Pulmonary Function Test Results    PFT (12/6/2023): FEV1 3.53 L (88%), FVC 4.81 L (96%), FEV1/FVC 73; minimal airflow obstruction  PFT (9/13/2023): FEV1 3.38 L (84%), FVC 4.85 L (97%), FEV1/FVC 70; minimal airflow obstruction.  PFT (6/12/2023): FEV1 3.43 L (85%), FVC 4.79 L (96%), FEV1/FVC 72; normal spirometry  PFT (9/12/2022): FEV1 3.79 L (94%), FVC 4.92 L (98%), FEV1/FVC 77; normal spirometry  PFT (9/11/2018): FEV1 3.06 L (73%), FVC 4.59 L (88%), FEV1/FVC 67; mild airflow obstruction    Current Medications     Current Outpatient Medications   Medication Instructions    albuterol (Ventolin HFA) 90 mcg/actuation inhaler 2 puffs, inhalation, Every 6 hours PRN    azithromycin (ZITHROMAX) 250 mg, oral, Daily    calcium citrate-vitamin D3  "(Citracal+D) 315 mg-5 mcg (200 unit) tablet 2 tablets, oral, Daily    DEKAs Plus, folic acid, 200 mcg-1,000 mcg-10 mg capsule 2 capsules, oral, Daily    elexacaftor-tezacaftor-ivacaft (Trikafta) 100-50-75 mg tablet TAKE TWO (2) ORANGE TABLETS BY MOUTH IN THE MORNING AND ONE (1) BLUE TABLET BY MOUTH IN THE EVENING. TAKE 12 HOURS APART WITH FATTY FOODS.    lipase-protease-amylase (Zenpep) 20,000-63,000- 84,000 unit capsule Take 6 capsules by mouth 3 times a day with meals. May also take 3 capsules with snacks for snacks.    omeprazole (PriLOSEC) 40 mg DR capsule 1 capsule, oral, Daily before breakfast    oseltamivir (TAMIFLU) 75 mg, oral, Every 12 hours       Physical Examination     /66 (BP Location: Right arm, Patient Position: Sitting, BP Cuff Size: Adult)   Pulse 66   Temp 36.8 °C (98.3 °F) (Oral)   Resp 20   Ht 1.75 m (5' 8.9\")   Wt 67.3 kg (148 lb 4.2 oz)   SpO2 95%   BMI 21.96 kg/m²     General: Pleasant man in no distress. Thin but not cachectic. Unaccompanied. Normal vocal character.  HEENT: Good dentition. Unremarkable nasal mucosa.  Neck: Supple, no lymphadenopathy or thyromegaly  Chest: Somewhat distant breath sounds in the upper fields posteriorly, otherwise clear to auscultation bilaterally.  Cardiac: Regular rhythm, no murmur  Abdomen: Soft, nontender, nondistended, Jeff-Key button in place, surgical scars from childhood procedures  Extremities: (+) Mild digital clubbing, no cyanosis or edema  Skin: Multiple nevi.    Metabolic Parameters     Sodium   Date/Time Value Ref Range Status   03/13/2023 12:35  136 - 145 mmol/L Final     Potassium   Date/Time Value Ref Range Status   03/13/2023 12:35 PM 4.2 3.5 - 5.3 mmol/L Final     Chloride   Date/Time Value Ref Range Status   03/13/2023 12:35  98 - 107 mmol/L Final     Bicarbonate   Date/Time Value Ref Range Status   03/13/2023 12:35 PM 29 21 - 32 mmol/L Final     Anion Gap   Date/Time Value Ref Range Status   03/13/2023 12:35 PM 13 10 " - 20 mmol/L Final     Urea Nitrogen   Date/Time Value Ref Range Status   03/13/2023 12:35 PM 24 (H) 6 - 23 mg/dL Final     Creatinine   Date/Time Value Ref Range Status   03/13/2023 12:35 PM 0.72 0.50 - 1.30 mg/dL Final     GFR MALE   Date/Time Value Ref Range Status   03/13/2023 12:35 PM >90 >90 mL/min/1.73m2 Final     Comment:      CALCULATIONS OF ESTIMATED GFR ARE PERFORMED   USING THE 2021 CKD-EPI STUDY REFIT EQUATION   WITHOUT THE RACE VARIABLE FOR THE IDMS-TRACEABLE   CREATININE METHODS.    https://jasn.asnjournals.org/content/early/2021/09/22/ASN.5730572134       Glucose   Date/Time Value Ref Range Status   03/13/2023 12:35 PM 98 74 - 99 mg/dL Final     Calcium   Date/Time Value Ref Range Status   03/13/2023 12:35 PM 9.5 8.6 - 10.6 mg/dL Final       Hematologic Parameters     WBC   Date/Time Value Ref Range Status   03/13/2023 12:35 PM 5.8 4.4 - 11.3 x10E9/L Final     Neutrophils Absolute   Date/Time Value Ref Range Status   03/13/2023 12:35 PM 3.24 1.20 - 7.70 x10E9/L Final     Neutrophils %   Date/Time Value Ref Range Status   03/13/2023 12:35 PM 55.7 40.0 - 80.0 % Final     Lymphocytes %   Date/Time Value Ref Range Status   03/13/2023 12:35 PM 34.4 13.0 - 44.0 % Final     Monocytes %   Date/Time Value Ref Range Status   03/13/2023 12:35 PM 5.8 2.0 - 10.0 % Final     Basophils %   Date/Time Value Ref Range Status   03/13/2023 12:35 PM 1.0 0.0 - 2.0 % Final     Eosinophils Absolute   Date/Time Value Ref Range Status   03/13/2023 12:35 PM 0.17 0.00 - 0.70 x10E9/L Final     Eosinophils %   Date/Time Value Ref Range Status   03/13/2023 12:35 PM 2.9 0.0 - 6.0 % Final     Hemoglobin   Date/Time Value Ref Range Status   03/13/2023 12:35 PM 15.0 13.5 - 17.5 g/dL Final     Hematocrit   Date/Time Value Ref Range Status   03/13/2023 12:35 PM 44.1 41.0 - 52.0 % Final     RBC   Date/Time Value Ref Range Status   03/13/2023 12:35 PM 4.65 4.50 - 5.90 x10E12/L Final     MCV   Date/Time Value Ref Range Status   03/13/2023  12:35 PM 95 80 - 100 fL Final     MCHC   Date/Time Value Ref Range Status   03/13/2023 12:35 PM 34.0 32.0 - 36.0 g/dL Final     Platelets   Date/Time Value Ref Range Status   03/13/2023 12:35  150 - 450 x10E9/L Final       Fat-Soluble Vitamin Levels     Vitamin D, 25-Hydroxy   Date/Time Value Ref Range Status   03/13/2023 12:35 PM 45 ng/mL Final     Comment:     .  DEFICIENCY:         < 20   NG/ML  INSUFFICIENCY:      20-29  NG/ML  SUFFICIENCY:         NG/ML    THIS ASSAY ACCURATELY QUANTIFIES THE SUM OF  VITAMIN D3, 25-HYDROXY AND VIT D2,25-HYDROXY.       Vitamin A (Retinol)   Date/Time Value Ref Range Status   03/13/2023 12:35 PM 0.60 0.30 - 1.20 mg/L Final     Vitamin A, Interpretation   Date/Time Value Ref Range Status   03/13/2023 12:35 PM Normal  Final     Comment:     This test was developed and its performance characteristics   determined by [facility]. It has not been cleared or   approved by the US Food and Drug Administration. This test was   performed in a CLIA certified laboratory and is intended for   clinical purposes.  Performed By: [facility]  [address] [place] [address]  : Henrry Chapa MD, PhD       Vitamin E (Alpha-Tocopherol)   Date/Time Value Ref Range Status   03/13/2023 12:35 PM 13.5 5.5 - 18.0 mg/L Final     Comment:     This test was developed and its performance characteristics   determined by [facility]. It has not been cleared or   approved by the US Food and Drug Administration. This test was   performed in a CLIA certified laboratory and is intended for   clinical purposes.       Vitamin E (Gamma-Tocopherol)   Date/Time Value Ref Range Status   03/13/2023 12:35 PM 1.0 0.0 - 6.0 mg/L Final     Comment:     Performed By: [facility]  [address] [place], [place] [id number]  : Henrry Chapa MD, PhD         LFT and Associated Parameters     AST   Date/Time Value Ref Range Status    03/13/2023 12:35 PM 35 9 - 39 U/L Final     ALT (SGPT)   Date/Time Value Ref Range Status   03/13/2023 12:35 PM 60 (H) 10 - 52 U/L Final     Comment:      Patients treated with Sulfasalazine may generate    falsely decreased results for ALT.       Alkaline Phosphatase   Date/Time Value Ref Range Status   03/13/2023 12:35  (H) 33 - 120 U/L Final     Total Bilirubin   Date/Time Value Ref Range Status   03/13/2023 12:35 PM 0.9 0.0 - 1.2 mg/dL Final     Bilirubin, Direct   Date/Time Value Ref Range Status   03/13/2023 12:35 PM 0.2 0.0 - 0.3 mg/dL Final     GGT   Date/Time Value Ref Range Status   03/13/2023 12:35 PM 26 5 - 64 U/L Final     Albumin   Date/Time Value Ref Range Status   03/13/2023 12:35 PM 4.4 3.4 - 5.0 g/dL Final     Total Protein   Date/Time Value Ref Range Status   03/13/2023 12:35 PM 6.9 6.4 - 8.2 g/dL Final       Coagulation Parameters     Protime   Date/Time Value Ref Range Status   09/12/2022 02:40 PM 11.3 9.8 - 13.4 sec Final     INR   Date/Time Value Ref Range Status   09/12/2022 02:40 PM 1.0 0.9 - 1.1 Final       Diabetes Laboratory Tests     Hemoglobin A1C   Date/Time Value Ref Range Status   03/13/2023 12:35 PM 5.8 (A) % Final     Comment:          Diagnosis of Diabetes-Adults   Non-Diabetic: < or = 5.6%   Increased risk for developing diabetes: 5.7-6.4%   Diagnostic of diabetes: > or = 6.5%  .       Monitoring of Diabetes                Age (y)     Therapeutic Goal (%)   Adults:          >18           <7.0   Pediatrics:    13-18           <7.5                   7-12           <8.0                   0- 6            7.5-8.5   American Diabetes Association. Diabetes Care 33(S1), Jan 2010.       Albumin/Creatine Ratio   Date/Time Value Ref Range Status   03/13/2023 12:35 PM SEE COMMENT 0.0 - 30.0 ug/mg crt Final     Comment:     One or more analytes used in this calculation   is outside of the analytical measurement range.  Calculation cannot be performed.       Glucose tolerance test  Fasting   Date/Time Value Ref Range Status   03/27/2019 11:35 AM 97 <126 mg/dL Final     Glucose tolerance test Two Hour   Date/Time Value Ref Range Status   03/27/2019 11:35  (A) <200 mg/dL Final        Immunology Laboratory Tests     IgE   Date/Time Value Ref Range Status   03/13/2023 12:35 PM 11 0 - 214 IU/mL Final       DEXA Scan     XR BONE density 11/28/2022    Narrative  Name: ANGY QUINONEZ  Patient ID: [id number] YOB: 1980 Height: 69.0 in.  Gender:     Male     Exam Date:  11/28/2022 Weight: 147.0 lbs.  Indications: Cystic Fibrosis, malabsorption    Fractures:    Treatments:  Calcium (E943.0), FLUTICASONE, OMEPRAZOLE, Vitamin D  (E933.5)    LEFT FEMUR - TOTAL  The bone mineral density : 0.926 g/cm2  T-score : -1.2    % of young normal mean :84%  Z-score : -1.0    % of age matched mean : 87%  % change vs. Previous : -0.1%    % change vs. Baseline : -1.8%    LEFT FEMUR - NECK  The bone mineral density : 0.882 g/cm2  T-score : -1.4    % of young normal mean: 82%  Z-score : -1.1    % of age matched mean : 86%  % change vs. Previous : 0.1%    % change vs. Baseline : 0.6%    SPINE L1-L4  The bone mineral density is 0.934 g/cm2  T-score : -2.4   % of young normal mean is 77%  Z-score : -2.4    % of age matched mean is 77%  % change vs. Previous : -2.4%    % change vs. Baseline : 0.1%      World Health Organization (WHO) criteria for post-menopausal,   Women:  Normal:       T-score at or above -1 SD  Osteopenia:   T-score between -1 and -2.5 SD  Osteoporosis: T-score at or below -2.5 SD    10-Year Fracture Risk:  Major Osteoporotic Fracture N/A  Hip Fracture                N/A  Reported Risk Factors       None    Interpretation:  According to World Health Organization criteria, classification is low bone mass.  Followup recommended on November 2024 or sooner as clinically warranted.       Chest Radiograph     XR chest 2 view 11/28/2022    Narrative  MRN: [id number]  Patient Name:  "ANGY QUINONEZ    STUDY:  TH CHEST 2 VIEW PA AND LAT;    INDICATION:  CF  R63.6: Underweight.    COMPARISON:  09/26/2018.    ACCESSION NUMBER(S):  [id number]    ORDERING CLINICIAN:  GABY LOPEZ    FINDINGS:  The cardiac silhouette size is within normal limits.  There is no focal consolidation, edema or pneumothorax.  No sizeable pleural effusion.    Impression  No acute cardiopulmonary process.       CF Sputum Culture     No results found for: \"AFBCX\"     Respiratory Culture, Cystic Fibrosis   Date/Time Value Ref Range Status   09/13/2023 12:05 PM 1+ NORMAL THROAT ADRIEL. (A)  Final   09/13/2023 12:05 PM Pseudomonas (A)  Final     Comment:     2+~FURTHER IDENTIFIED AS PSEUDOMONAS FULVA   06/12/2023 12:05 PM 2+ NORMAL THROAT ADRIEL.  Final   03/10/2023 12:39 PM 2+ NORMAL THROAT ADRIEL.  Final       Susceptibility data from last 3000 days.  Collected Specimen Info Organism Amikacin Aztreonam Cefepime Ceftazidime Ciprofloxacin Meropenem Piperacillin/Tazobactam Tobramycin   09/13/23 Respiratory Pseudomonas S I S S S S I S       Problem List Items Addressed This Visit       Cystic fibrosis with pulmonary manifestations (CMS/HCC) - Primary    Current Assessment & Plan     Stable lung function. No exacerbation present.  Continue Trikafta  Not doing aerosols or chest physiotherapy.  Surveillance throat swab culture  Sick plan: ciprofloxacin 750 mg by mouth twice daily for 14 days.         Relevant Medications    elexacaftor-tezacaftor-ivacaft (Trikafta) 100-50-75 mg tablet    Other Relevant Orders    Respiratory Culture, Cystic Fibrosis    Difficulty maintaining weight    Current Assessment & Plan     Problems obtaining nutritional supplements for oral administration in lieu of tube feeding. To speak with CF dietitian, Emily Nageotte.         Exocrine pancreatic manifestation of cystic fibrosis (CMS/HCC)    Current Assessment & Plan     Continue PERT.         Relevant Medications    lipase-protease-amylase (Zenpep) " 20,000-63,000- 84,000 unit capsule    Gastrostomy status (CMS/[facility])    Current Assessment & Plan     Froy met with Dr. Johnson today. Plan to get another type of PEG tube button (LEILANI-KEY 24 Fr 2.5 cm) from [facility].  Froy would still like to get rid of the feeding tube (because it leaks) unless he needs to use it to maintain weight.          GERD (gastroesophageal reflux disease)    Current Assessment & Plan     Controlled on omeprazole 40 mg daily          Healthcare maintenance    Current Assessment & Plan     DEXA scan (11/28/2022): Lumbar spine Z/T score -2.4 (osteopenia)  Eligible for screening colonoscopy: Possible participation in [facility].  Influenza vaccination: 11/28/2023; has oseltamivir.  SARS CoV-2 vaccination: 3/26/2021, 2/23/2021  Shingrix: First injection given today in clinic; could wait until next 3-month appointment for second injection.         Relevant Medications    oseltamivir (Tamiflu) 75 mg capsule    Impaired glucose tolerance    Current Assessment & Plan     Froy met with Dr. Schwarz on 9/13/2023. Plan to monitor FSG. No insulins.          Melanocytic nevus of trunk    Overview     Shave Biopsy (12/7/2023): MELANOCYTIC NEVUS WITH MILD JUNCTIONAL ARCHITECTURAL DISORDER AND ADJACENT DERMAL MELANOCYTIC NEVUS (SEE COMMENT)         Current Assessment & Plan     Dermatology following annually.         Need for zoster vaccination    Relevant Orders    Zoster vaccine, recombinant, adult (SHINGRIX) (Completed)    Pseudomonas aeruginosa infection    Current Assessment & Plan     Positive culture from 9/13/2023  Given clinical stability and patient preferences, will forego inhaled antibiotics. May revisit this notion if exacerbation, increase in daily symptom burden, and/or decline in lung function despite Trikafta.  Should be taking azithromycin 250 mg daily         Relevant Medications    azithromycin (Zithromax) 250 mg tablet       Follow-up:  Visit date not found; did not make  appointment before leaving office today; 3 months.    Duke Chatman MD   12/06/2023

## 2023-12-07 LAB
LABORATORY COMMENT REPORT: NORMAL
PATH REPORT.FINAL DX SPEC: NORMAL
PATH REPORT.GROSS SPEC: NORMAL
PATH REPORT.MICROSCOPIC SPEC OTHER STN: NORMAL
PATH REPORT.RELEVANT HX SPEC: NORMAL
PATH REPORT.TOTAL CANCER: NORMAL

## 2023-12-07 NOTE — PROGRESS NOTES
Pt. Is here alone in clinic today.     Pt. Reports that he has been having difficulty getting supplements. He also reports that he needs a different G-Tube size for concerns regarding leaking. GI Physician recommended switching to Jeff-key 24 Fr 2.5 cm sized tube. Pt. Would like to trial stopping tube feedings to potentially have G-Tube removed. Pt. Was okay having back up supply of tube feedings so if he does start losing wt., he will be able to restart feedings. Pt. Would still like to receive Boost Plus supplements, which is appropriate given current BMI status. Pt. Had no further concerns at this time. RD provided contact info and encouraged pt. To reach out as nutritional concerns arise. Pt. Was agreeable.        Impression:   At risk BMI    Recommendations:  Recommend continue drinking Boost Plus BID  Recommend continuing flushing G-Tube daily  Recommend reach out to RD as nutritional concerns arise

## 2023-12-08 PROBLEM — D22.5 MELANOCYTIC NEVUS OF TRUNK: Status: ACTIVE | Noted: 2023-12-08

## 2023-12-08 PROBLEM — Z23 NEED FOR ZOSTER VACCINATION: Status: ACTIVE | Noted: 2023-12-08

## 2023-12-08 PROBLEM — A49.8 PSEUDOMONAS AERUGINOSA INFECTION: Status: ACTIVE | Noted: 2023-12-08

## 2023-12-08 PROBLEM — R63.8 DIFFICULTY MAINTAINING WEIGHT: Status: ACTIVE | Noted: 2023-12-08

## 2023-12-08 NOTE — ASSESSMENT & PLAN NOTE
Problems obtaining nutritional supplements for oral administration in lieu of tube feeding. To speak with CF dietitian, Emily Nageotte.

## 2023-12-08 NOTE — ASSESSMENT & PLAN NOTE
Froy met with Dr. Johnson today. Plan to get another type of PEG tube button (LEILANI-KEY 24 Fr 2.5 cm) from [facility].  Froy would still like to get rid of the feeding tube (because it leaks) unless he needs to use it to maintain weight.

## 2023-12-08 NOTE — ASSESSMENT & PLAN NOTE
Positive culture from 9/13/2023  Given clinical stability and patient preferences, will forego inhaled antibiotics. May revisit this notion if exacerbation, increase in daily symptom burden, and/or decline in lung function despite Trikafta.  Should be taking azithromycin 250 mg daily

## 2023-12-08 NOTE — ASSESSMENT & PLAN NOTE
Stable lung function. No exacerbation present.  Continue Trikafta  Not doing aerosols or chest physiotherapy.  Surveillance throat swab culture  Sick plan: ciprofloxacin 750 mg by mouth twice daily for 14 days.

## 2023-12-09 LAB — BACTERIA SPT CF RESP CULT: NORMAL

## 2023-12-18 ENCOUNTER — SPECIALTY PHARMACY (OUTPATIENT)
Dept: PHARMACY | Facility: CLINIC | Age: 43
End: 2023-12-18

## 2023-12-18 RX ORDER — ELEXACAFTOR, TEZACAFTOR, AND IVACAFTOR 100-50-75
KIT ORAL
Qty: 84 EACH | Refills: 11 | Status: CANCELLED | OUTPATIENT
Start: 2023-12-18 | End: 2024-12-16

## 2023-12-20 RX ORDER — ELEXACAFTOR, TEZACAFTOR, AND IVACAFTOR 100-50-75
KIT ORAL
Qty: 84 EACH | Refills: 11 | Status: CANCELLED | OUTPATIENT
Start: 2023-12-18 | End: 2024-12-16

## 2023-12-22 RX ORDER — ELEXACAFTOR, TEZACAFTOR, AND IVACAFTOR 100-50-75
KIT ORAL
Qty: 84 EACH | Refills: 11 | Status: CANCELLED | OUTPATIENT
Start: 2023-12-18 | End: 2024-12-16

## 2023-12-27 RX ORDER — ELEXACAFTOR, TEZACAFTOR, AND IVACAFTOR 100-50-75
KIT ORAL
Qty: 84 EACH | Refills: 11 | Status: CANCELLED | OUTPATIENT
Start: 2023-12-18 | End: 2024-12-16

## 2023-12-29 DIAGNOSIS — E84.0 CYSTIC FIBROSIS WITH PULMONARY MANIFESTATIONS (MULTI): ICD-10-CM

## 2024-01-02 RX ORDER — ELEXACAFTOR, TEZACAFTOR, AND IVACAFTOR 100-50-75
KIT ORAL
Qty: 84 EACH | Refills: 11 | OUTPATIENT
Start: 2024-01-02 | End: 2024-12-31

## 2024-01-02 NOTE — TELEPHONE ENCOUNTER
Pulmonary Attending  Cystic Fibrosis Service    Requested Prescriptions     Refused Prescriptions Disp Refills    elexacaftor-tezacaftor-ivacaft (Trikafta) 100-50-75 mg tablet 84 each 11     Sig: TAKE TWO (2) ORANGE TABLETS BY MOUTH IN THE MORNING AND ONE (1) BLUE TABLET BY MOUTH IN THE EVENING. TAKE 12 HOURS APART WITH FATTY FOODS.     Refused By: DUKE CHATMAN     Reason for Refusal: Refill not appropriate         [id number] - [address] [address]  Phone: [phone] Fax: [phone]      Duke Chatman MD  1/2/2024  9:18 AM

## 2024-01-03 RX ORDER — ELEXACAFTOR, TEZACAFTOR, AND IVACAFTOR 100-50-75
KIT ORAL
Qty: 84 EACH | Refills: 11 | Status: CANCELLED | OUTPATIENT
Start: 2024-01-03 | End: 2025-01-01

## 2024-01-05 DIAGNOSIS — E84.0 CYSTIC FIBROSIS WITH PULMONARY MANIFESTATIONS (MULTI): ICD-10-CM

## 2024-01-05 RX ORDER — ELEXACAFTOR, TEZACAFTOR, AND IVACAFTOR 100-50-75
KIT ORAL
Qty: 84 EACH | Refills: 11 | Status: CANCELLED | OUTPATIENT
Start: 2024-01-03 | End: 2025-01-01

## 2024-01-05 NOTE — TELEPHONE ENCOUNTER
Pulmonary Attending  Cystic Fibrosis Service    Requested Prescriptions     Signed Prescriptions Disp Refills    elexacaftor-tezacaftor-ivacaft (Trikafta) 100-50-75 mg tablet 84 each 11     Sig: TAKE TWO (2) ORANGE TABLETS BY MOUTH IN THE MORNING AND ONE (1) BLUE TABLET BY MOUTH IN THE EVENING. TAKE 12 HOURS APART WITH FATTY FOODS.     Authorizing Provider: DUKE CHATMAN      [facility]  [address] [place], [place] [address]  Phone: [phone] Fax: [phone]      Duke Chatman MD  1/5/2024  11:08 AM

## 2024-01-08 PROCEDURE — RXMED WILLOW AMBULATORY MEDICATION CHARGE

## 2024-01-10 ENCOUNTER — PHARMACY VISIT (OUTPATIENT)
Dept: PHARMACY | Facility: CLINIC | Age: 44
End: 2024-01-10
Payer: COMMERCIAL

## 2024-01-16 ENCOUNTER — TELEPHONE (OUTPATIENT)
Dept: PEDIATRIC PULMONOLOGY | Facility: HOSPITAL | Age: 44
End: 2024-01-16
Payer: MEDICARE

## 2024-01-16 ENCOUNTER — SPECIALTY PHARMACY (OUTPATIENT)
Dept: PHARMACY | Facility: CLINIC | Age: 44
End: 2024-01-16

## 2024-01-16 VITALS — WEIGHT: 151.4 LBS | BODY MASS INDEX: 22.42 KG/M2

## 2024-01-16 NOTE — TELEPHONE ENCOUNTER
RD called pt. To check in. Pt. Is currently in [place].    Pt. Has not been doing tube feedings. He reports that his wt. Has been stable. He does Ensure Plus BID orally. He was switched from Boost because there was a supply issues, but he really likes the Ensure and would like to stay on that. RD told pt. To tell [employer] that he prefers Ensure to keep getting that instead of the Boost. Pt. Was agreeable.     Pt. Received new sized g-tube. He reports that this new tube has been leaking less. He reports that he has not been flushing the tube regularly. RD recommended flushing the tube with water daily to prevent clogging. Pt. Stated that he understood and was agreeable.     Pt. Had no further concerns at this time. RD encouraged pt. To reach out as nutritional concerns arise. Pt. Was agreeable.

## 2024-01-29 DIAGNOSIS — E84.8 EXOCRINE PANCREATIC MANIFESTATION OF CYSTIC FIBROSIS (MULTI): ICD-10-CM

## 2024-01-29 DIAGNOSIS — E84.8 PANCREATIC INSUFFICIENCY DUE TO CYSTIC FIBROSIS (MULTI): ICD-10-CM

## 2024-01-29 DIAGNOSIS — K86.89 PANCREATIC INSUFFICIENCY DUE TO CYSTIC FIBROSIS (MULTI): ICD-10-CM

## 2024-01-29 RX ORDER — OMEPRAZOLE 40 MG/1
40 CAPSULE, DELAYED RELEASE ORAL
Qty: 90 CAPSULE | Refills: 3 | Status: SHIPPED | OUTPATIENT
Start: 2024-01-29 | End: 2025-01-28

## 2024-01-29 RX ORDER — PANCRELIPASE LIPASE, PANCRELIPASE PROTEASE, PANCRELIPASE AMYLASE 20000; 63000; 84000 [USP'U]/1; [USP'U]/1; [USP'U]/1
CAPSULE, DELAYED RELEASE ORAL
Qty: 540 CAPSULE | Refills: 11 | Status: SHIPPED | OUTPATIENT
Start: 2024-01-29 | End: 2025-01-28

## 2024-01-29 NOTE — PROGRESS NOTES
Pulmonary Attending  Cystic Fibrosis Service    Requested Prescriptions     Signed Prescriptions Disp Refills    omeprazole (PriLOSEC) 40 mg DR capsule 90 capsule 3     Sig: Take 1 capsule (40 mg) by mouth once daily in the morning. Take before meals.    lipase-protease-amylase (Zenpep) 20,000-63,000- 84,000 unit capsule 540 capsule 11     Sig: Take 6 capsules by mouth 3 times a day with meals. May also take 3 capsules with snacks for snacks.       [id number] - [address] [address]  Phone: [phone] Fax: [phone]    Duke Chatman MD  1/29/2024  12:54 PM

## 2024-02-01 ENCOUNTER — SPECIALTY PHARMACY (OUTPATIENT)
Dept: PHARMACY | Facility: CLINIC | Age: 44
End: 2024-02-01

## 2024-02-01 PROCEDURE — RXMED WILLOW AMBULATORY MEDICATION CHARGE

## 2024-02-02 ENCOUNTER — PHARMACY VISIT (OUTPATIENT)
Dept: PHARMACY | Facility: CLINIC | Age: 44
End: 2024-02-02
Payer: COMMERCIAL

## 2024-02-28 ENCOUNTER — SPECIALTY PHARMACY (OUTPATIENT)
Dept: PHARMACY | Facility: CLINIC | Age: 44
End: 2024-02-28

## 2024-02-28 PROCEDURE — RXMED WILLOW AMBULATORY MEDICATION CHARGE

## 2024-03-01 ENCOUNTER — PHARMACY VISIT (OUTPATIENT)
Dept: PHARMACY | Facility: CLINIC | Age: 44
End: 2024-03-01
Payer: COMMERCIAL

## 2024-03-11 ENCOUNTER — DOCUMENTATION (OUTPATIENT)
Dept: PEDIATRIC PULMONOLOGY | Facility: HOSPITAL | Age: 44
End: 2024-03-11

## 2024-03-11 ENCOUNTER — OFFICE VISIT (OUTPATIENT)
Dept: GASTROENTEROLOGY | Facility: HOSPITAL | Age: 44
End: 2024-03-11
Payer: MEDICARE

## 2024-03-11 ENCOUNTER — NUTRITION (OUTPATIENT)
Dept: PEDIATRIC PULMONOLOGY | Facility: HOSPITAL | Age: 44
End: 2024-03-11

## 2024-03-11 ENCOUNTER — OFFICE VISIT (OUTPATIENT)
Dept: PEDIATRIC PULMONOLOGY | Facility: HOSPITAL | Age: 44
End: 2024-03-11
Payer: MEDICARE

## 2024-03-11 ENCOUNTER — HOSPITAL ENCOUNTER (OUTPATIENT)
Dept: RESPIRATORY THERAPY | Facility: HOSPITAL | Age: 44
Discharge: HOME | End: 2024-03-11
Payer: MEDICARE

## 2024-03-11 ENCOUNTER — APPOINTMENT (OUTPATIENT)
Dept: RESPIRATORY THERAPY | Facility: HOSPITAL | Age: 44
End: 2024-03-11
Payer: MEDICARE

## 2024-03-11 ENCOUNTER — SPECIALTY PHARMACY (OUTPATIENT)
Dept: PHARMACY | Facility: CLINIC | Age: 44
End: 2024-03-11

## 2024-03-11 VITALS
SYSTOLIC BLOOD PRESSURE: 112 MMHG | TEMPERATURE: 97.6 F | HEIGHT: 69 IN | DIASTOLIC BLOOD PRESSURE: 70 MMHG | RESPIRATION RATE: 18 BRPM | BODY MASS INDEX: 22.4 KG/M2 | OXYGEN SATURATION: 97 % | WEIGHT: 151.24 LBS | HEART RATE: 78 BPM

## 2024-03-11 DIAGNOSIS — Z00.00 HEALTHCARE MAINTENANCE: ICD-10-CM

## 2024-03-11 DIAGNOSIS — E84.9 CF (CYSTIC FIBROSIS) (MULTI): ICD-10-CM

## 2024-03-11 DIAGNOSIS — E84.19 CYSTIC FIBROSIS WITH GASTROINTESTINAL MANIFESTATIONS (MULTI): Primary | ICD-10-CM

## 2024-03-11 DIAGNOSIS — R73.02 IMPAIRED GLUCOSE TOLERANCE: Primary | ICD-10-CM

## 2024-03-11 DIAGNOSIS — K86.81 EXOCRINE PANCREATIC INSUFFICIENCY (HHS-HCC): ICD-10-CM

## 2024-03-11 DIAGNOSIS — E13.8: ICD-10-CM

## 2024-03-11 DIAGNOSIS — M85.80 OSTEOPENIA, UNSPECIFIED LOCATION: ICD-10-CM

## 2024-03-11 DIAGNOSIS — R79.89 ABNORMAL LFTS (LIVER FUNCTION TESTS): ICD-10-CM

## 2024-03-11 DIAGNOSIS — Z12.11 COLON CANCER SCREENING: ICD-10-CM

## 2024-03-11 DIAGNOSIS — E84.9 CYSTIC FIBROSIS (MULTI): ICD-10-CM

## 2024-03-11 DIAGNOSIS — A49.8 PSEUDOMONAS AERUGINOSA INFECTION: ICD-10-CM

## 2024-03-11 PROBLEM — E08.9 DIABETES MELLITUS RELATED TO CF (CYSTIC FIBROSIS) (MULTI): Status: ACTIVE | Noted: 2024-03-11

## 2024-03-11 PROBLEM — E84.8 DIABETES MELLITUS RELATED TO CF (CYSTIC FIBROSIS) (MULTI): Status: ACTIVE | Noted: 2024-03-11

## 2024-03-11 LAB
25(OH)D3 SERPL-MCNC: 46 NG/ML (ref 30–100)
ALBUMIN SERPL BCP-MCNC: 4.3 G/DL (ref 3.4–5)
ALP SERPL-CCNC: 103 U/L (ref 33–120)
ALT SERPL W P-5'-P-CCNC: 43 U/L (ref 10–52)
ANION GAP SERPL CALC-SCNC: 14 MMOL/L (ref 10–20)
APPEARANCE UR: CLEAR
AST SERPL W P-5'-P-CCNC: 28 U/L (ref 9–39)
BASOPHILS # BLD AUTO: 0.07 X10*3/UL (ref 0–0.1)
BASOPHILS NFR BLD AUTO: 1.4 %
BILIRUB DIRECT SERPL-MCNC: 0.1 MG/DL (ref 0–0.3)
BILIRUB SERPL-MCNC: 0.8 MG/DL (ref 0–1.2)
BILIRUB UR STRIP.AUTO-MCNC: NEGATIVE MG/DL
BUN SERPL-MCNC: 17 MG/DL (ref 6–23)
CALCIUM SERPL-MCNC: 9.5 MG/DL (ref 8.6–10.6)
CHLORIDE SERPL-SCNC: 105 MMOL/L (ref 98–107)
CHOLEST SERPL-MCNC: 155 MG/DL (ref 0–199)
CHOLESTEROL/HDL RATIO: 2.7
CO2 SERPL-SCNC: 28 MMOL/L (ref 21–32)
COLOR UR: YELLOW
CREAT SERPL-MCNC: 0.81 MG/DL (ref 0.5–1.3)
EGFRCR SERPLBLD CKD-EPI 2021: >90 ML/MIN/1.73M*2
EOSINOPHIL # BLD AUTO: 0.27 X10*3/UL (ref 0–0.7)
EOSINOPHIL NFR BLD AUTO: 5.3 %
ERYTHROCYTE [DISTWIDTH] IN BLOOD BY AUTOMATED COUNT: 13 % (ref 11.5–14.5)
EST. AVERAGE GLUCOSE BLD GHB EST-MCNC: 111 MG/DL
GGT SERPL-CCNC: 16 U/L (ref 5–64)
GLUCOSE SERPL-MCNC: 119 MG/DL (ref 74–99)
GLUCOSE UR STRIP.AUTO-MCNC: NORMAL MG/DL
HBA1C MFR BLD: 5.5 %
HCT VFR BLD AUTO: 45.3 % (ref 41–52)
HDLC SERPL-MCNC: 57.7 MG/DL
HGB BLD-MCNC: 14.5 G/DL (ref 13.5–17.5)
IGE SERPL-ACNC: 6 IU/ML (ref 0–214)
IMM GRANULOCYTES # BLD AUTO: 0.01 X10*3/UL (ref 0–0.7)
IMM GRANULOCYTES NFR BLD AUTO: 0.2 % (ref 0–0.9)
KETONES UR STRIP.AUTO-MCNC: NEGATIVE MG/DL
LDLC SERPL CALC-MCNC: 72 MG/DL
LEUKOCYTE ESTERASE UR QL STRIP.AUTO: NEGATIVE
LYMPHOCYTES # BLD AUTO: 1.77 X10*3/UL (ref 1.2–4.8)
LYMPHOCYTES NFR BLD AUTO: 34.8 %
MCH RBC QN AUTO: 31 PG (ref 26–34)
MCHC RBC AUTO-ENTMCNC: 32 G/DL (ref 32–36)
MCV RBC AUTO: 97 FL (ref 80–100)
MONOCYTES # BLD AUTO: 0.32 X10*3/UL (ref 0.1–1)
MONOCYTES NFR BLD AUTO: 6.3 %
NEUTROPHILS # BLD AUTO: 2.65 X10*3/UL (ref 1.2–7.7)
NEUTROPHILS NFR BLD AUTO: 52 %
NITRITE UR QL STRIP.AUTO: NEGATIVE
NON HDL CHOLESTEROL: 97 MG/DL (ref 0–149)
NRBC BLD-RTO: 0 /100 WBCS (ref 0–0)
PH UR STRIP.AUTO: 5.5 [PH]
PHOSPHATE SERPL-MCNC: 3.3 MG/DL (ref 2.5–4.9)
PLATELET # BLD AUTO: 186 X10*3/UL (ref 150–450)
POTASSIUM SERPL-SCNC: 4.4 MMOL/L (ref 3.5–5.3)
PROT SERPL-MCNC: 6.4 G/DL (ref 6.4–8.2)
PROT UR STRIP.AUTO-MCNC: NEGATIVE MG/DL
RBC # BLD AUTO: 4.67 X10*6/UL (ref 4.5–5.9)
RBC # UR STRIP.AUTO: NEGATIVE /UL
SODIUM SERPL-SCNC: 143 MMOL/L (ref 136–145)
SP GR UR STRIP.AUTO: 1.01
TRIGL SERPL-MCNC: 128 MG/DL (ref 0–149)
UROBILINOGEN UR STRIP.AUTO-MCNC: NORMAL MG/DL
VLDL: 26 MG/DL (ref 0–40)
WBC # BLD AUTO: 5.1 X10*3/UL (ref 4.4–11.3)

## 2024-03-11 PROCEDURE — 99215 OFFICE O/P EST HI 40 MIN: CPT | Performed by: INTERNAL MEDICINE

## 2024-03-11 PROCEDURE — 84446 ASSAY OF VITAMIN E: CPT | Performed by: INTERNAL MEDICINE

## 2024-03-11 PROCEDURE — 84100 ASSAY OF PHOSPHORUS: CPT | Performed by: INTERNAL MEDICINE

## 2024-03-11 PROCEDURE — 94010 BREATHING CAPACITY TEST: CPT | Performed by: INTERNAL MEDICINE

## 2024-03-11 PROCEDURE — 80048 BASIC METABOLIC PNL TOTAL CA: CPT | Performed by: INTERNAL MEDICINE

## 2024-03-11 PROCEDURE — 80061 LIPID PANEL: CPT | Performed by: INTERNAL MEDICINE

## 2024-03-11 PROCEDURE — 90750 HZV VACC RECOMBINANT IM: CPT | Performed by: INTERNAL MEDICINE

## 2024-03-11 PROCEDURE — 83036 HEMOGLOBIN GLYCOSYLATED A1C: CPT | Performed by: INTERNAL MEDICINE

## 2024-03-11 PROCEDURE — 82977 ASSAY OF GGT: CPT | Performed by: INTERNAL MEDICINE

## 2024-03-11 PROCEDURE — 85025 COMPLETE CBC W/AUTO DIFF WBC: CPT | Performed by: INTERNAL MEDICINE

## 2024-03-11 PROCEDURE — 36415 COLL VENOUS BLD VENIPUNCTURE: CPT | Performed by: INTERNAL MEDICINE

## 2024-03-11 PROCEDURE — 99213 OFFICE O/P EST LOW 20 MIN: CPT | Performed by: INTERNAL MEDICINE

## 2024-03-11 PROCEDURE — 3048F LDL-C <100 MG/DL: CPT | Performed by: INTERNAL MEDICINE

## 2024-03-11 PROCEDURE — 87070 CULTURE OTHR SPECIMN AEROBIC: CPT | Performed by: INTERNAL MEDICINE

## 2024-03-11 PROCEDURE — 3074F SYST BP LT 130 MM HG: CPT | Performed by: INTERNAL MEDICINE

## 2024-03-11 PROCEDURE — 3044F HG A1C LEVEL LT 7.0%: CPT | Performed by: INTERNAL MEDICINE

## 2024-03-11 PROCEDURE — 82785 ASSAY OF IGE: CPT | Performed by: INTERNAL MEDICINE

## 2024-03-11 PROCEDURE — 94010 BREATHING CAPACITY TEST: CPT

## 2024-03-11 PROCEDURE — 82306 VITAMIN D 25 HYDROXY: CPT | Performed by: INTERNAL MEDICINE

## 2024-03-11 PROCEDURE — 99213 OFFICE O/P EST LOW 20 MIN: CPT | Mod: 25 | Performed by: INTERNAL MEDICINE

## 2024-03-11 PROCEDURE — 84075 ASSAY ALKALINE PHOSPHATASE: CPT | Performed by: INTERNAL MEDICINE

## 2024-03-11 PROCEDURE — G2212 PROLONG OUTPT/OFFICE VIS: HCPCS | Performed by: INTERNAL MEDICINE

## 2024-03-11 PROCEDURE — 84590 ASSAY OF VITAMIN A: CPT | Performed by: INTERNAL MEDICINE

## 2024-03-11 PROCEDURE — 3078F DIAST BP <80 MM HG: CPT | Performed by: INTERNAL MEDICINE

## 2024-03-11 PROCEDURE — 81003 URINALYSIS AUTO W/O SCOPE: CPT | Performed by: INTERNAL MEDICINE

## 2024-03-11 RX ORDER — POLYETHYLENE GLYCOL-3350 AND ELECTROLYTES 236; 6.74; 5.86; 2.97; 22.74 G/274.31G; G/274.31G; G/274.31G; G/274.31G; G/274.31G
8000 POWDER, FOR SOLUTION ORAL ONCE
Qty: 8000 ML | Refills: 0 | Status: SHIPPED | OUTPATIENT
Start: 2024-03-11 | End: 2024-03-11

## 2024-03-11 RX ORDER — BISACODYL 5 MG
15 TABLET, DELAYED RELEASE (ENTERIC COATED) ORAL ONCE
Qty: 3 TABLET | Refills: 0 | Status: SHIPPED | OUTPATIENT
Start: 2024-03-11 | End: 2024-03-11

## 2024-03-11 ASSESSMENT — ANXIETY QUESTIONNAIRES
5. BEING SO RESTLESS THAT IT IS HARD TO SIT STILL: SEVERAL DAYS
3. WORRYING TOO MUCH ABOUT DIFFERENT THINGS: NOT AT ALL
GAD7 TOTAL SCORE: 2
2. NOT BEING ABLE TO STOP OR CONTROL WORRYING: NOT AT ALL
6. BECOMING EASILY ANNOYED OR IRRITABLE: SEVERAL DAYS
4. TROUBLE RELAXING: NOT AT ALL
1. FEELING NERVOUS, ANXIOUS, OR ON EDGE: NOT AT ALL
IF YOU CHECKED OFF ANY PROBLEMS ON THIS QUESTIONNAIRE, HOW DIFFICULT HAVE THESE PROBLEMS MADE IT FOR YOU TO DO YOUR WORK, TAKE CARE OF THINGS AT HOME, OR GET ALONG WITH OTHER PEOPLE: NOT DIFFICULT AT ALL
7. FEELING AFRAID AS IF SOMETHING AWFUL MIGHT HAPPEN: NOT AT ALL

## 2024-03-11 ASSESSMENT — PATIENT HEALTH QUESTIONNAIRE - PHQ9
5. POOR APPETITE OR OVEREATING: NOT AT ALL
2. FEELING DOWN, DEPRESSED OR HOPELESS: NOT AT ALL
6. FEELING BAD ABOUT YOURSELF - OR THAT YOU ARE A FAILURE OR HAVE LET YOURSELF OR YOUR FAMILY DOWN: NOT AT ALL
1. LITTLE INTEREST OR PLEASURE IN DOING THINGS: NOT AT ALL
7. TROUBLE CONCENTRATING ON THINGS, SUCH AS READING THE NEWSPAPER OR WATCHING TELEVISION: SEVERAL DAYS
4. FEELING TIRED OR HAVING LITTLE ENERGY: NOT AT ALL
3. TROUBLE FALLING OR STAYING ASLEEP OR SLEEPING TOO MUCH: NOT AT ALL
8. MOVING OR SPEAKING SO SLOWLY THAT OTHER PEOPLE COULD HAVE NOTICED. OR THE OPPOSITE, BEING SO FIGETY OR RESTLESS THAT YOU HAVE BEEN MOVING AROUND A LOT MORE THAN USUAL: NOT AT ALL
9. THOUGHTS THAT YOU WOULD BE BETTER OFF DEAD, OR OF HURTING YOURSELF: NOT AT ALL

## 2024-03-11 ASSESSMENT — ENCOUNTER SYMPTOMS
DEPRESSION: 0
LOSS OF SENSATION IN FEET: 0
OCCASIONAL FEELINGS OF UNSTEADINESS: 0

## 2024-03-11 NOTE — PROGRESS NOTES
Tristin Sutherland M.D. 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 Pre-Trikafta ppFEV1 in mid 60% decile. Post Trikafta ppFEV1 in the mid 80% decile. Last hospitalization and in August 2018. Historically, difficulty maintaining adequate BMI, has feeding tube. Pre-Trikafta BMI 17-19 kg/m². Post Trikafta BMI stable at around 22.5 kg/m². Last Pseudomonas aeruginosa (November 2016). Last MRSA (March 2016). Occasional Aspergillus fumigatus.     CF complications: EPI on PERT (has Relizorb for tube feeding); GERD controlled with omeprazole; suspicion for CF liver disease (Dr. Johnson, March 2023), wanted to get repeat RUQ ultrasound with duplex; osteopenia on DEXA in September 2022; (+) 2-hour OGTT on 3/27/2019 (baseline 97 mg/dL, 2-hour 219) but endocrinology considers him to have impaired glucose tolerance.    HPI (3/11/2024):  In-person visit today; no interval respiratory illnesses; still has some leaking around Jeff-Key button but much less than he had with the mini-profile; not doing tube feeding but taking two Boost supplements per day. Resumed taking azithromycin daily after (+) throat swab culture for Pseudomonas fulva in September 2023. Not coughing. Does not expectorate sputum. No wheezing. He is not exercising regularly, but attributes to time limitation. If he misses omeprazole, notices that gastric juice that leaks around PEG tube burns. Interested in research.     HPI (12/6/2023):  Not doing tube feeding.  Tube leaks, would like to get a new button.  Trying to maintain and gain weight using oral supplements but problems obtaining them. No interval need for antibiotics.  Feels like his respiratory status is good. Not doing aerosols or chest physiotherapy.  Generally not coughing or expectorating sputum.  Denies any side effects associated with Trikafta.  Went to dermatology and had shave biopsy of pigmented  lesion of lower back on left.    HPI (9/13/2023): First visit with Froy. Heretofore followed with Dr. Bravo, last visit on 6/12/2023. At that point, standard visit. No exacerbation. Doing tube feeding occasionally with Relizorb. Otherwise standard CF health measures. Since that time, he has done well without need for oral antibiotics. His girlfriend (Gauri) has a grandson in [place] with retinoblastoma, so they have been back and forth over past few months to support Gauri's son and daughter-in-law. Froy wonders whether he can/should have the VZV vaccine. He has also had a spot on skin of back bleed after scratching. Wants MD to take a look.     Current Use of Health Management Strategies:    Respiratory Interventions  Albuterol: Does not use  Pulmozyme: Does not use  Hypertonic saline: Does not use    HFCWO (percussive vest): Does not use  Oscillatory PEP: Does not use  Exercise: Other 2-3 times per week (isometrics)  LTE antagonist: No  Azithromycin: 250 mg daily  Aztreonam lysine ([place]): Does not use  Tobramycin: Does not use    Colistin: Does not use  Meropenem (inhaled): Does not use  Vancomycin (inhaled): Does not use  ICS: Does not use  ICS/LABA: Does not use  LAMA: No  CFTR modulator: Elexacaftor-Tezacaftor-Ivacaftor (Trikafta) Full-dose (2 orange tablets in AM, 1 blue tablet in PM)  High-dose ibuprofen: No   Oxygen: Does not use  Chronic oral antibiotics: Does not use     Digestive Health Interventions    Acid-suppressing medication?: Yes - omeprazole  Using CF vitamins?: Yes  Taking pancreatic enzymes?: Yes - Zenpep 20, 4- 5 capsules with meals, 2-3 with snacks  Any diarrhea?: No  Any steatorrhea?: No  Any constipation?: No  Using laxatives?: No  Feeding tube?: Yes  Supplemental enteral nutrition?: Yes - Ensure Plus, 2 per day (takes enzymes as if snack).     Pulmonary Function Test Results    PFT (3/11/2024): FEV1 3.42 L (85%), FVC 4.89 L (98%), FEV1/FVC 70; normal spirometry  PFT (12/6/2023):  "FEV1 3.53 L (88%), FVC 4.81 L (96%), FEV1/FVC 73; minimal airflow obstruction  PFT (9/13/2023): FEV1 3.38 L (84%), FVC 4.85 L (97%), FEV1/FVC 70; minimal airflow obstruction.  PFT (6/12/2023): FEV1 3.43 L (85%), FVC 4.79 L (96%), FEV1/FVC 72; normal spirometry  PFT (9/12/2022): FEV1 3.79 L (94%), FVC 4.92 L (98%), FEV1/FVC 77; normal spirometry  PFT (9/11/2018): FEV1 3.06 L (73%), FVC 4.59 L (88%), FEV1/FVC 67; mild airflow obstruction    Current Medications     Current Outpatient Medications   Medication Instructions    bisacodyl (DULCOLAX) 15 mg, oral, Once, Do not crush, chew, or split.  Take as directed the day prior to colonoscopy.    calcium citrate-vitamin D3 (Citracal+D) 315 mg-5 mcg (200 unit) tablet 2 tablets, oral, Daily    DEKAs Plus, folic acid, 200 mcg-1,000 mcg-10 mg capsule 2 capsules, oral, Daily    elexacaftor-tezacaftor-ivacaft (Trikafta) 100-50-75 mg tablet TAKE TWO (2) ORANGE TABLETS BY MOUTH IN THE MORNING AND ONE (1) BLUE TABLET BY MOUTH IN THE EVENING. TAKE 12 HOURS APART WITH FATTY FOODS.    lipase-protease-amylase (Zenpep) 20,000-63,000- 84,000 unit capsule Take 6 capsules by mouth 3 times a day with meals. May also take 3 capsules with snacks for snacks.    omeprazole (PRILOSEC) 40 mg, oral, Daily before breakfast    polyethylene glycol-electrolytes (GaviLyte-G) 420 gram solution 8,000 mL, oral, Once, Take as directed per CF specific bowel prep instructions.  Take part of prep the day of procedure starting 5 hours prior to procedure, finishing 2-3 hours before procedure.       Physical Examination     /70 (BP Location: Right arm, Patient Position: Sitting, BP Cuff Size: Adult)   Pulse 78   Temp 36.4 °C (97.6 °F) (Oral)   Resp 18   Ht 1.75 m (5' 8.9\")   Wt 68.6 kg (151 lb 3.8 oz)   SpO2 97%   BMI 22.40 kg/m²     General: Pleasant man in no distress. Thin but not cachectic. Unaccompanied. Normal vocal character.  HEENT: Good dentition. Unremarkable nasal mucosa.  Neck: Supple, " no lymphadenopathy or thyromegaly  Chest: Clear to auscultation bilaterally.   Cardiac: Regular rhythm, no murmur  Abdomen: Soft, nontender, nondistended, Jeff-Key button in place, surgical scars from childhood procedures  Extremities: (+) Mild digital clubbing, no cyanosis or edema  Skin: Multiple nevi.    Metabolic Parameters     Sodium   Date/Time Value Ref Range Status   03/11/2024 09:51  136 - 145 mmol/L Final     Potassium   Date/Time Value Ref Range Status   03/11/2024 09:51 AM 4.4 3.5 - 5.3 mmol/L Final     Chloride   Date/Time Value Ref Range Status   03/11/2024 09:51  98 - 107 mmol/L Final     Bicarbonate   Date/Time Value Ref Range Status   03/11/2024 09:51 AM 28 21 - 32 mmol/L Final     Anion Gap   Date/Time Value Ref Range Status   03/11/2024 09:51 AM 14 10 - 20 mmol/L Final     Urea Nitrogen   Date/Time Value Ref Range Status   03/11/2024 09:51 AM 17 6 - 23 mg/dL Final     Creatinine   Date/Time Value Ref Range Status   03/11/2024 09:51 AM 0.81 0.50 - 1.30 mg/dL Final     GFR MALE   Date/Time Value Ref Range Status   03/13/2023 12:35 PM >90 >90 mL/min/1.73m2 Final     Comment:      CALCULATIONS OF ESTIMATED GFR ARE PERFORMED   USING THE 2021 CKD-EPI STUDY REFIT EQUATION   WITHOUT THE RACE VARIABLE FOR THE IDMS-TRACEABLE   CREATININE METHODS.    https://jasn.asnjournals.org/content/early/2021/09/22/ASN.3307871708       Glucose   Date/Time Value Ref Range Status   03/11/2024 09:51  (H) 74 - 99 mg/dL Final     Calcium   Date/Time Value Ref Range Status   03/11/2024 09:51 AM 9.5 8.6 - 10.6 mg/dL Final     Phosphorus   Date/Time Value Ref Range Status   03/11/2024 09:51 AM 3.3 2.5 - 4.9 mg/dL Final     Comment:     The performance characteristics of phosphorus testing in heparinized plasma have been validated by the individual  laboratory site where testing is performed. Testing on heparinized plasma is not approved by the FDA; however, such approval is not necessary.     Cholesterol    Date/Time Value Ref Range Status   03/11/2024 09:51  0 - 199 mg/dL Final     Comment:           Age      Desirable   Borderline High   High     0-19 Y     0 - 169       170 - 199     >/= 200    20-24 Y     0 - 189       190 - 224     >/= 225         >24 Y     0 - 199       200 - 239     >/= 240   **All ranges are based on fasting samples. Specific   therapeutic targets will vary based on patient-specific   cardiac risk.    Pediatric guidelines reference:Pediatrics 2011, 128(S5).Adult guidelines reference: NCEP ATPIII Guidelines,EDDIE 2001, 258:2486-97    Venipuncture immediately after or during the administration of Metamizole may lead to falsely low results. Testing should be performed immediately prior to Metamizole dosing.   03/13/2023 12:35  0 - 199 mg/dL Final     Comment:     .      AGE      DESIRABLE   BORDERLINE HIGH   HIGH     0-19 Y     0 - 169       170 - 199     >/= 200    20-24 Y     0 - 189       190 - 224     >/= 225         >24 Y     0 - 199       200 - 239     >/= 240   **All ranges are based on fasting samples. Specific   therapeutic targets will vary based on patient-specific   cardiac risk.  .   Pediatric guidelines reference:Pediatrics 2011, 128(S5).   Adult guidelines reference: NCEP ATPIII Guidelines,     EDDIE 2001, 258:2486-97  .   Venipuncture immediately after or during the    administration of Metamizole may lead to falsely   low results. Testing should be performed immediately   prior to Metamizole dosing.     09/12/2022 02:40  0 - 199 mg/dL Final     Comment:     .      AGE      DESIRABLE   BORDERLINE HIGH   HIGH     0-19 Y     0 - 169       170 - 199     >/= 200    20-24 Y     0 - 189       190 - 224     >/= 225         >24 Y     0 - 199       200 - 239     >/= 240   **All ranges are based on fasting samples. Specific   therapeutic targets will vary based on patient-specific   cardiac risk.  .   Pediatric guidelines reference:Pediatrics 2011, 128(S5).   Adult  guidelines reference: NCEP ATPIII Guidelines,     EDDIE 2001, 258:2486-97  .   Venipuncture immediately after or during the    administration of Metamizole may lead to falsely   low results. Testing should be performed immediately   prior to Metamizole dosing.       HDL-Cholesterol   Date/Time Value Ref Range Status   03/11/2024 09:51 AM 57.7 mg/dL Final     Comment:       Age       Very Low   Low     Normal    High    0-19 Y    < 35      < 40     40-45     ----  20-24 Y    ----     < 40      >45      ----        >24 Y      ----     < 40     40-60      >60       HDL   Date/Time Value Ref Range Status   03/13/2023 12:35 PM 66.4 mg/dL Final     Comment:     .      AGE      VERY LOW   LOW     NORMAL    HIGH       0-19 Y       < 35   < 40     40-45     ----    20-24 Y       ----   < 40       >45     ----      >24 Y       ----   < 40     40-60      >60  .     09/12/2022 02:40 PM 50.2 mg/dL Final     Comment:     .      AGE      VERY LOW   LOW     NORMAL    HIGH       0-19 Y       < 35   < 40     40-45     ----    20-24 Y       ----   < 40       >45     ----      >24 Y       ----   < 40     40-60      >60  .       Triglycerides   Date/Time Value Ref Range Status   03/11/2024 09:51  0 - 149 mg/dL Final     Comment:        Age         Desirable   Borderline High   High     Very High   0 D-90 D    19 - 174         ----         ----        ----  91 D- 9 Y     0 -  74        75 -  99     >/= 100      ----    10-19 Y     0 -  89        90 - 129     >/= 130      ----    20-24 Y     0 - 114       115 - 149     >/= 150      ----         >24 Y     0 - 149       150 - 199    200- 499    >/= 500    Venipuncture immediately after or during the administration of Metamizole may lead to falsely low results. Testing should be performed immediately prior to Metamizole dosing.   03/13/2023 12:35 PM 93 0 - 149 mg/dL Final     Comment:     .      AGE      DESIRABLE   BORDERLINE HIGH   HIGH     VERY HIGH   0 D-90 D    19 - 174          ----         ----        ----  91 D- 9 Y     0 -  74        75 -  99     >/= 100      ----    10-19 Y     0 -  89        90 - 129     >/= 130      ----    20-24 Y     0 - 114       115 - 149     >/= 150      ----         >24 Y     0 - 149       150 - 199    200- 499    >/= 500  .   Venipuncture immediately after or during the    administration of Metamizole may lead to falsely   low results. Testing should be performed immediately   prior to Metamizole dosing.     09/12/2022 02:40  0 - 149 mg/dL Final     Comment:     .      AGE      DESIRABLE   BORDERLINE HIGH   HIGH     VERY HIGH   0 D-90 D    19 - 174         ----         ----        ----  91 D- 9 Y     0 -  74        75 -  99     >/= 100      ----    10-19 Y     0 -  89        90 - 129     >/= 130      ----    20-24 Y     0 - 114       115 - 149     >/= 150      ----         >24 Y     0 - 149       150 - 199    200- 499    >/= 500  .   Venipuncture immediately after or during the    administration of Metamizole may lead to falsely   low results. Testing should be performed immediately   prior to Metamizole dosing.         Hematologic Parameters     WBC   Date/Time Value Ref Range Status   03/11/2024 09:51 AM 5.1 4.4 - 11.3 x10*3/uL Final     Neutrophils Absolute   Date/Time Value Ref Range Status   03/11/2024 09:51 AM 2.65 1.20 - 7.70 x10*3/uL Final     Comment:     Percent differential counts (%) should be interpreted in the context of the absolute cell counts (cells/uL).     Neutrophils %   Date/Time Value Ref Range Status   03/11/2024 09:51 AM 52.0 40.0 - 80.0 % Final     Lymphocytes %   Date/Time Value Ref Range Status   03/11/2024 09:51 AM 34.8 13.0 - 44.0 % Final     Monocytes %   Date/Time Value Ref Range Status   03/11/2024 09:51 AM 6.3 2.0 - 10.0 % Final     Basophils %   Date/Time Value Ref Range Status   03/11/2024 09:51 AM 1.4 0.0 - 2.0 % Final     Eosinophils Absolute   Date/Time Value Ref Range Status   03/11/2024 09:51 AM 0.27 0.00 - 0.70  x10*3/uL Final     Eosinophils %   Date/Time Value Ref Range Status   03/11/2024 09:51 AM 5.3 0.0 - 6.0 % Final     Hemoglobin   Date/Time Value Ref Range Status   03/11/2024 09:51 AM 14.5 13.5 - 17.5 g/dL Final     Hematocrit   Date/Time Value Ref Range Status   03/11/2024 09:51 AM 45.3 41.0 - 52.0 % Final     RBC   Date/Time Value Ref Range Status   03/11/2024 09:51 AM 4.67 4.50 - 5.90 x10*6/uL Final     MCV   Date/Time Value Ref Range Status   03/11/2024 09:51 AM 97 80 - 100 fL Final     MCHC   Date/Time Value Ref Range Status   03/11/2024 09:51 AM 32.0 32.0 - 36.0 g/dL Final     Platelets   Date/Time Value Ref Range Status   03/11/2024 09:51  150 - 450 x10*3/uL Final       Fat-Soluble Vitamin Levels     Vitamin D, 25-Hydroxy, Total   Date/Time Value Ref Range Status   03/11/2024 09:51 AM 46 30 - 100 ng/mL Final     Vitamin A (Retinol)   Date/Time Value Ref Range Status   03/13/2023 12:35 PM 0.60 0.30 - 1.20 mg/L Final     Vitamin A, Interpretation   Date/Time Value Ref Range Status   03/13/2023 12:35 PM Normal  Final     Comment:     This test was developed and its performance characteristics   determined by [facility]. It has not been cleared or   approved by the US Food and Drug Administration. This test was   performed in a CLIA certified laboratory and is intended for   clinical purposes.  Performed By: [facility]  [address] [place] [id number]  : Henrry Chapa MD, PhD       Vitamin E (Alpha-Tocopherol)   Date/Time Value Ref Range Status   03/13/2023 12:35 PM 13.5 5.5 - 18.0 mg/L Final     Comment:     This test was developed and its performance characteristics   determined by [facility]. It has not been cleared or   approved by the US Food and Drug Administration. This test was   performed in a CLIA certified laboratory and is intended for   clinical purposes.       Vitamin E (Gamma-Tocopherol)   Date/Time Value Ref Range Status   03/13/2023  12:35 PM 1.0 0.0 - 6.0 mg/L Final     Comment:     Performed By: [facility]  [address] [place] [id number]  : Henrry Chapa MD, PhD         LFT and Associated Parameters     AST   Date/Time Value Ref Range Status   03/11/2024 09:51 AM 28 9 - 39 U/L Final     ALT   Date/Time Value Ref Range Status   03/11/2024 09:51 AM 43 10 - 52 U/L Final     Comment:     Patients treated with Sulfasalazine may generate falsely decreased results for ALT.     Alkaline Phosphatase   Date/Time Value Ref Range Status   03/11/2024 09:51  33 - 120 U/L Final     Bilirubin, Total   Date/Time Value Ref Range Status   03/11/2024 09:51 AM 0.8 0.0 - 1.2 mg/dL Final     Bilirubin, Direct   Date/Time Value Ref Range Status   03/11/2024 09:51 AM 0.1 0.0 - 0.3 mg/dL Final     GGT   Date/Time Value Ref Range Status   03/11/2024 09:51 AM 16 5 - 64 U/L Final     Albumin   Date/Time Value Ref Range Status   03/11/2024 09:51 AM 4.3 3.4 - 5.0 g/dL Final     Total Protein   Date/Time Value Ref Range Status   03/11/2024 09:51 AM 6.4 6.4 - 8.2 g/dL Final       Coagulation Parameters     Protime   Date/Time Value Ref Range Status   09/12/2022 02:40 PM 11.3 9.8 - 13.4 sec Final     INR   Date/Time Value Ref Range Status   09/12/2022 02:40 PM 1.0 0.9 - 1.1 Final       Diabetes Laboratory Tests     Hemoglobin A1C   Date/Time Value Ref Range Status   03/11/2024 09:51 AM 5.5 see below % Final     Albumin/Creatine Ratio   Date/Time Value Ref Range Status   03/13/2023 12:35 PM SEE COMMENT 0.0 - 30.0 ug/mg crt Final     Comment:     One or more analytes used in this calculation   is outside of the analytical measurement range.  Calculation cannot be performed.       Glucose tolerance test Fasting   Date/Time Value Ref Range Status   03/27/2019 11:35 AM 97 <126 mg/dL Final     Glucose tolerance test Two Hour   Date/Time Value Ref Range Status   03/27/2019 11:35  (A) <200 mg/dL Final        Immunology Laboratory  Tests     IgE   Date/Time Value Ref Range Status   03/11/2024 09:51 AM 6 0 - 214 IU/mL Final       DEXA Scan     XR BONE density 11/28/2022    Narrative  Name: ANGY QUINONEZ  Patient ID: [id number] YOB: 1980 Height: 69.0 in.  Gender:     Male     Exam Date:  11/28/2022 Weight: 147.0 lbs.  Indications: Cystic Fibrosis, malabsorption    Treatments:  Calcium (E943.0), FLUTICASONE, OMEPRAZOLE, Vitamin D  (E933.5)    LEFT FEMUR - TOTAL  The bone mineral density : 0.926 g/cm2  T-score : -1.2    % of young normal mean :84%  Z-score : -1.0    % of age matched mean : 87%  % change vs. Previous : -0.1%    % change vs. Baseline : -1.8%    LEFT FEMUR - NECK  The bone mineral density : 0.882 g/cm2  T-score : -1.4    % of young normal mean: 82%  Z-score : -1.1    % of age matched mean : 86%  % change vs. Previous : 0.1%    % change vs. Baseline : 0.6%    SPINE L1-L4  The bone mineral density is 0.934 g/cm2  T-score : -2.4   % of young normal mean is 77%  Z-score : -2.4    % of age matched mean is 77%  % change vs. Previous : -2.4%    % change vs. Baseline : 0.1%    World Health Organization (WHO) criteria for post-menopausal,   Women:  Normal:       T-score at or above -1 SD  Osteopenia:   T-score between -1 and -2.5 SD  Osteoporosis: T-score at or below -2.5 SD    10-Year Fracture Risk:  Major Osteoporotic Fracture N/A  Hip Fracture                N/A  Reported Risk Factors       None    Interpretation:  According to World Health Organization criteria, classification is low bone mass.  Followup recommended on November 2024 or sooner as clinically warranted.    Chest Radiograph     XR chest 2 view 11/28/2022    Narrative  MRN: [id number]  Patient Name: ANGY QUINONEZ    STUDY:  TH CHEST 2 VIEW PA AND LAT;    INDICATION:  CF  R63.6: Underweight.    COMPARISON:  09/26/2018.    ACCESSION NUMBER(S):  [id number]    ORDERING CLINICIAN:  GABY JESSICA    FINDINGS:  The cardiac silhouette size is within normal  "limits.  There is no focal consolidation, edema or pneumothorax.  No sizeable pleural effusion.    Impression  No acute cardiopulmonary process.    CF Sputum Culture     No results found for: \"AFBCX\"     Respiratory Culture, Cystic Fibrosis   Date/Time Value Ref Range Status   12/06/2023 03:03 PM (1+) Rare Normal throat scarlet  Final   09/13/2023 12:05 PM 1+ NORMAL THROAT SCARLET. (A)  Final   09/13/2023 12:05 PM Pseudomonas (A)  Final     Comment:     2+~FURTHER IDENTIFIED AS PSEUDOMONAS FULVA   06/12/2023 12:05 PM 2+ NORMAL THROAT SCARLET.  Final   03/10/2023 12:39 PM 2+ NORMAL THROAT SCARLET.  Final       Susceptibility data from last 3000 days.  Collected Specimen Info Organism Amikacin Aztreonam Cefepime Ceftazidime Ciprofloxacin Meropenem Piperacillin/Tazobactam Tobramycin   12/06/23 Fluid from Throat Swab Normal throat scarlet           09/13/23 Respiratory Pseudomonas S I S S S S I S       Problem List Items Addressed This Visit       Cystic fibrosis (CMS/[facility])    Current Assessment & Plan     Stable lung function. No exacerbation present.  Continue Trikafta  Not doing aerosols or chest physiotherapy.  Surveillance throat swab culture  Sick plan: ciprofloxacin 750 mg by mouth twice daily for 14 days.         Relevant Orders    CBC and Auto Differential (Completed)    Gamma-Glutamyl Transferase (Completed)    Hemoglobin A1C (Completed)    Hepatic Function Panel (Completed)    Immunoglobulin IgE (Completed)    Lipid Panel (Completed)    DCP (duyen gamma carboxy prothrombin); [facility]; DCP - Miscellaneous Test    Renal Function Panel    Vitamin A    Vitamin D 25-Hydroxy,Total (for eval of Vitamin D levels) (Completed)    Vitamin E    Basic Metabolic Panel (Completed)    Phosphorus (Completed)    Respiratory Culture, Cystic Fibrosis    Urinalysis with Reflex Microscopic (Completed)    Zoster vaccine, recombinant, adult (SHINGRIX) (Completed)    Healthcare maintenance    Current Assessment & Plan     DEXA scan " (11/28/2022): Lumbar spine Z/T score -2.4 (osteopenia)  Discussed screening colonoscopy with Dr. Johnosn today; possible participation in [facility].  Influenza vaccination: 11/28/2023; has oseltamivir.  SARS CoV-2 vaccination: 3/26/2021, 2/23/2021  Shingrix: Second injection given in clinic today; referred to local pharmacy for other vaccinations.         Relevant Orders    Gamma-Glutamyl Transferase (Completed)    Zoster vaccine, recombinant, adult (SHINGRIX) (Completed)    Impaired glucose tolerance - Primary    Current Assessment & Plan     Needs 2-hour OGTT at next appointment.  Lipid panel looks good today (3/11/2024)         Relevant Orders    Gamma-Glutamyl Transferase (Completed)    Hemoglobin A1C (Completed)    Lipid Panel (Completed)    Osteopenia    Current Assessment & Plan     Vitamin D level was above goal today.  He pursues weight-bearing exercise and is not high-risk for fracture.  DEXA in 2025.  No indication for anti-resorptive therapy.         Other specified diabetes mellitus with unspecified complications (CMS/HCC)    Relevant Orders    Gamma-Glutamyl Transferase (Completed)    Pseudomonas aeruginosa infection    Current Assessment & Plan     (+) throat swab for Pseudomonas fulva in September 2023.  We decided to stop azithromycin today.          Follow-up:  4 months; you should be prepared to have your 2-hour oral glucose tolerance test done at that point.    Duke Chatman MD   03/11/2024

## 2024-03-11 NOTE — PATIENT INSTRUCTIONS
We will ask the schedulers to contact you regarding your colonoscopy.  If you're not able to talk with them when they call, the number to call is [phone].  I will send the prep instructions via myinfoQ.    We will need to arrange an ultrasound once you've had your colonoscopy.

## 2024-03-11 NOTE — ASSESSMENT & PLAN NOTE
Vitamin D level was above goal today.  He pursues weight-bearing exercise and is not high-risk for fracture.  DEXA in 2025.  No indication for anti-resorptive therapy.

## 2024-03-11 NOTE — ASSESSMENT & PLAN NOTE
DEXA scan (11/28/2022): Lumbar spine Z/T score -2.4 (osteopenia)  Discussed screening colonoscopy with Dr. Johnson today; possible participation in [facility].  Influenza vaccination: 11/28/2023; has oseltamivir.  SARS CoV-2 vaccination: 3/26/2021, 2/23/2021  Shingrix: Second injection given in clinic today; referred to local pharmacy for other vaccinations.

## 2024-03-11 NOTE — PROGRESS NOTES
Respiratory Note:    Patient's Airway Clearance: Afflo Vest  Frequency: PRN with illness  Exercise: not routinely - discussed making/finding time to exercise  Oscillating Device: yes flutter (2) PRN  Nassar Cough: no  Primary: afflo vest  Secondary: flutter    Aerosols: Name of medication, frequency, type of nebulizer used, Mask or Mouthpiece?  Bronchodilators: no  Pulmozyme: no  Hypertonic Saline: no  Antibiotics: no  ICS/Combinations: no (no longer using advair)    Compressor: yes tabletop and e flow device    Home PFT Device: yes  Frequency of Home PFT's: has not used in a long time - discussed bring spirometer to clinic to set-up and review technique    Patient Assistance Program: no    Oxygen: no    CPAP, BIPAP, Assisted Breathing (use at home or in-patient): no    Have you smoked cigarettes in the last year?: no     Are you exposed to second hand smoke: no only when he visits his grandma    Does anyone in your household smoke cigarettes?: no    Did you use electronic cigarettes (vape) this year?: no    During the reporting year, how often was this patient vaping?: not at all      Comments:      - toothbrush given - reviewed toothbrush care

## 2024-03-11 NOTE — PROGRESS NOTES
Subjective: pt presented to clinic for check in. He says that he has not been very active lately d/t a busy schedule throughout the day. However, he did note that he is doing about 50 (10x5) push-ups every day and has been looking on the internet for other UE exercises. Also mentioned running a few times while down in [place] recently. Pt showed some interested in increasing his physical activity levels.     He reports that the L hip pain he mentioned during the last visit has resolved. He no longer feels pain at L TFL or quad areas and demonstrated improvement in hip flexion AROM with no pain. However, he mentions a recent onset of R shoulder pain at the posterior deltoid and infraspinatus areas. He thinks that it is d/t consistently sleeping on R side at night. Pain is not always present and is only sometimes felt with shoulder movement. No N&T. Pt reported no other complaints.     Objective:   - TTP at R posterior delt and infraspinatus.     - + lateral dolores on R (pain elicited in posterior delt)  - + pain in posterior delt elicited during C5 myotome test.   - - resisted ER on R  - - painful arc test    - pt demonstrates good functional strength and AROM.     - s/s consistent with R shoulder inflammation d/t sleeping position causing stress and lengthening on R shoulder musculature.     Treatment:   - STM to infraspinatus (5 min).   - banded (green) horizontal ABD, ER, and diagonals (B)   - x10    Education:   - pt educated to apply ice to shoulder for 15 mins when pain is present to decrease inflammation.   - pt instructed to continue push-ups at home and implement exercises worked on during treatment today (3x10) as long as pain is not present.     Pt showed interest in continuing HEP.

## 2024-03-11 NOTE — PROGRESS NOTES
[facility] SPECIALTY PHARMACY PATIENT REASSESSMENT NOTE    Froy Parisi is a 43 y.o. year-old male seen in clinic .    [facility] SUPPLIED MEDICATION:  Tonya Parisi's care will be continued with the referring prescriber.     GENERAL ASSESSMENT:  Are there any changes to your home medications, OTCs or supplements? Changes as follows: boost to ensure    Current Outpatient Medications on File Prior to Visit   Medication Sig Dispense Refill    bisacodyl (Dulcolax) 5 mg EC tablet Take 3 tablets (15 mg) by mouth 1 time for 1 dose. Do not crush, chew, or split.  Take as directed the day prior to colonoscopy. 3 tablet 0    calcium citrate-vitamin D3 (Citracal+D) 315 mg-5 mcg (200 unit) tablet Take 2 tablets by mouth once daily. 180 tablet 3    DEKAs Plus, folic acid, 200 mcg-1,000 mcg-10 mg capsule Take 2 capsules by mouth once daily.      elexacaftor-tezacaftor-ivacaft (Trikafta) 100-50-75 mg tablet TAKE TWO (2) ORANGE TABLETS BY MOUTH IN THE MORNING AND ONE (1) BLUE TABLET BY MOUTH IN THE EVENING. TAKE 12 HOURS APART WITH FATTY FOODS. 84 each 11    lipase-protease-amylase (Zenpep) 20,000-63,000- 84,000 unit capsule Take 6 capsules by mouth 3 times a day with meals. May also take 3 capsules with snacks for snacks. 540 capsule 11    omeprazole (PriLOSEC) 40 mg DR capsule Take 1 capsule (40 mg) by mouth once daily in the morning. Take before meals. 90 capsule 3    polyethylene glycol-electrolytes (GaviLyte-G) 420 gram solution Take 8,000 mL by mouth 1 time for 1 dose. Take as directed per CF specific bowel prep instructions.  Take part of prep the day of procedure starting 5 hours prior to procedure, finishing 2-3 hours before procedure. 8000 mL 0    [DISCONTINUED] albuterol (Ventolin HFA) 90 mcg/actuation inhaler Inhale 2 puffs every 6 hours if needed.      [DISCONTINUED] azithromycin (Zithromax) 250 mg tablet Take 1 tablet (250 mg) by mouth once daily. 90 tablet 3     No current  facility-administered medications on file prior to visit.       Do you have any new allergies? no new allergies reported    Allergies as of 03/11/2024    (No Known Allergies)       Have you been diagnosed with any new medical conditions? no new reported medical conditions    Patient Active Problem List   Diagnosis    Cystic fibrosis with pulmonary manifestations (CMS/HCC)    Exocrine pancreatic manifestation of cystic fibrosis (CMS/HCC)    Gastrostomy status (CMS/HCC)    GERD (gastroesophageal reflux disease)    Sensorineural hearing loss (SNHL) of left ear with restricted hearing of right ear    Bleeding diathesis due to vitamin K deficiency (CMS/[facility])    Healthcare maintenance    Osteopenia    Vitamin D deficiency    Impaired glucose tolerance    Pseudomonas aeruginosa infection    Need for zoster vaccination    Melanocytic nevus of trunk    Difficulty maintaining weight       CFTR Genotype: :F508 del / 1898+1G->A  Current CFTR Modulator: Trikafta  Dose: 2 orange tablets in the morning and 1 blue tablet in the evening with fat containing food    TOLERANCE:   Have you experienced any side effects from this medication? no reported side effects    Are there any changes to current therapy regimen? No changes reported    EFFICACY:    Have you developed any new symptoms of your condition? No changes reported    How do you feel your medication is affecting your disease state? Feeling better, weight gain    GOALS:  Your goals of therapy are: Improved quality of life, Improve/maintain lung function, and Reduce frequency of illness    COMPLIANCE / ADHERENCE:  Have you had any unplanned missed doses?Yes  If yes, how often do you miss doses and is there a particular contributing reason? 0-1 doses missed per week,     What barriers to adherence does the patient have? (Answer Y/N for all):  Patient has a difficult time remembering to take medications? No travel bag, keeps meds in, sits on dresser, routine  Difficult  administration technique?No  Medication cost? No  Patient does not think medication is beneficial?No  Other?   What actions were taken to address barriers?    Does the patient have any barriers to self-administration (including physical and mental)? No  List barriers:   Describe actions taken to mitigate barriers:    Health Maintenance  Health Maintenance Due   Topic Date Due    Hepatitis B Vaccines (1 of 3 - 3-dose series) Never done    Medicare Annual Wellness Visit (AWV)  Never done    HIV Screening  Never done    Diabetes: Foot Exam  Never done    Hepatitis C Screening  Never done    DTaP/Tdap/Td Vaccines (1 - Tdap) Never done    MMR Vaccines (1 of 1 - Standard series) Never done    Varicella Vaccines (1 of 2 - 2-dose childhood series) Never done    Pneumococcal Vaccine: Pediatrics (0 to 5 Years) and At-Risk Patients (6 to 64 Years) (2 - PCV) 06/28/2012    Diabetes: Retinopathy Screening  12/06/2017    COVID-19 Vaccine (3 - 2023-24 season) 09/01/2023    Diabetes: Urine Protein Screening  03/13/2024       Labs  Lab Results   Component Value Date    WBC 5.1 03/11/2024    HGB 14.5 03/11/2024    HCT 45.3 03/11/2024     03/11/2024    CHOL 155 03/11/2024    TRIG 128 03/11/2024    HDL 57.7 03/11/2024    ALT 43 03/11/2024    AST 28 03/11/2024     03/11/2024    K 4.4 03/11/2024     03/11/2024    CREATININE 0.81 03/11/2024    BUN 17 03/11/2024    CO2 28 03/11/2024    TSH 0.71 09/12/2022    INR 1.0 09/12/2022    HGBA1C 5.5 03/11/2024       Pulmonary Function Tests     FEV1   Date/Time Value Ref Range Status   03/11/2024 10:13 AM 3.42 liters      Comment:     85%          PATIENT MANAGEMENT:    Do you have any questions regarding your medications, or care? additional questions: discussed vaccines    Do you have any concerns with access to your medications? No concerns expressed    How would you classify your Quality of Life? Doing well    Fills medications at:  Specialty    How would you describe your  satisfaction with  Specialty Pharmacy? Satisfied    Do you have any additional suggestions to improve  Specialty Pharmacy services: n/a    IMPRESSION/PLAN:  Is patient high risk (potential patients:  pregnancy, geriatric, pediatric)?  n/a  Is laboratory follow-up needed? Liver function tests due annually. Last done: this visit  Is a clinical intervention needed? yes    Additional comments:   -omeprazole purchased in [place]  -needs second shingles shot- to be given in CF clinic today, ensured that vaccine was available  -discussed PCP  -discussed vaccines, Tdap, Prevnar, Covid - Froy is open to receiving those vaccines, will check with local [facility]    Nadia Malik, PharmD   3/11/2024 12:52 PM

## 2024-03-11 NOTE — PROGRESS NOTES
"Subjective   Patient ID: Froy Parisi is a 43 y.o. male who presents for Cystic Fibrosis in outpatient clinic.    Social Work Visit Type: This is an Annual Visit for Froy Parisi  Location: [facility]    Identifying Information                              : 1980  Assessment date: 3/11/2024    Objective     Patient accompanied by:  Self    Family System / Parents:    Raised by:  with biological parent(s), step mom ()  Mother:  Name:  Fern   Father:  Name:  Froy ()  Siblings & Children Information:  # of step-children: 2    Relationships / Social History:  Patient lives with: patient lives with Gauri, goes back and forth to [place]. Has house together in [place]. Also lives with Gauri's mom, brother-in-law and brother-in-law's son  Relationship status: Relationship Status / Family Dynamic: In a Relationship: Yes  How Long?  7 years   Marital/relationship status:  no relationship issues at this time  Does patient have adequate housing?:Owns home  Does patient feel safe in home?: Yes  Supportive Relationships: spouse/partner    Education:  Education: Highest Level of Education: Some College  Employer information: Disabled    Income / Insurance:  Total Household Income:  $10k-$19k/year  Insurance Options:  Medicare .   Secondary Insurance Options : Medicaid: .  [facility]    Disability  Are you on any form of disability? yes  What type? SSDI  What is the status? active    Adherence and Understanding of Health:  Knowledge of health:  good, Overall adherence:  good, Adherence with medications:  good, Managed by: patient, Understanding of medication:  good, and Adherence with appointments:   good    Cognition/Mood/Affect:  The patient was neatly groomed, appropriately dressed and adequately nourished.  What are the patient's psychosocial stressors:  \"feels pretty happy, maybe has minor stressors day to day\" \"larger household\"     Mood:   How has your mood been lately?: \"good\"  How do you manage " stress?: be alone, go on walks, vape pen and exercise  What are your goals for this year?: go camping more, backpacking  What about having CF do you find challenging?  Wish made more plans for the future     Thought Processes   Organized?  yes  Have you ever been hospitalized in a psychiatric hospital? no  Do you currently or have you ever seen a therapist/counselor/psychiatrist? yes  *If yes, indicate name/contact information: around 2007 when dad passed away  Have you used medications for mental health issues, sleep and/or pain now or in the past?  no    Substance Use:  Cannabis - vape pen. Occasional beer, every few months.     Advance Directives  Do you have an advance directive/living will?  no  Completing in clinic     Strengths:  supportive partner, knowledge of health  What are the patient's coping behaviors?  Vape pen, exercise     Barriers:  none reported     Impression:  Patient's 2yo grandson is doing better with cancer dx, still stressful for family. Gauri is in [place] helping with family surrounding grandson.   SW engaged in active listening and supportive counseling. SW facilitated the expression of thoughts and feeling related to the issues noted above. SW reviewed contact information and availability for on-going support in between CF clinic appointments.      SW participated in a CF clinic huddle during which time the patients care plan, treatment needs and issues were discussed. The information noted above has been shared with the CF team during CF clinic.     PHQ9: 1  GAD7: 2    Assessment/Plan   Plan: Completing HCPOA in clinic. Will add to chart once complete. Naming girlfriend Gauri as primary, sister Magdalena second, mom third.   On-going psychosocial and emotional support, supportive counseling and referrals as indicated to maximize adjustment to living with cystic fibrosis.    Diana Knowles LCSW  March 11, 2024

## 2024-03-11 NOTE — Clinical Note
4 months; in-office; needs to have 2-hour OGTT that day; schedule on day when Dr. Johnson can also see him. Thanks.

## 2024-03-11 NOTE — PROGRESS NOTES
ESTABLISHED CF GI PATIENT NOTE    Froy Parisi  1980   [id number]     Chief Complaint:   Chief Complaint   Patient presents with    Gastrostomy Tube Follow Up    Cystic Fibrosis      HPI:   Patient is a 43 y.o. year old male with a PMH significant for cystic fibrosis (genotype [id number]/1898+1G->A)    on elexacaftor/tezacaftor/ivacaftor, h/o exocrine pancreatic insufficiency, h/o gastrostomy tube placement here for followup. Patient was last seen in CF GI clinic 12/6/23.  Patient was having difficulty with leaking at gastrostomy site at that time and there was concern that the AMT Mini-One tube had more difficulty with leaking than LEILANI-KEY tube.  At that time, new LEILANI-KEY 24 Fr 2.5 cm tube was ordered for patient.   Patient received his new gastrostomy tube and believes he changed it before Gwen.  He states that it is leaking much less than the AMT Mini-One.  He changes dressing on it daily, whereas with AMT Mini-One he would have to change the dressing a few times a day.  He has not been using the tube but wants to keep it in case he needs it if he gets sick.  Froy has not used the gastrostomy tube since September.  Also wants to get colonoscopy scheduled.  He has no family history of esophageal, gastric, or colorectal cancer.  Feels he has more leaking and redness at gastrostomy site if he misses taking omeprazole.  Some mild itching at gastrostomy site.  Uses neosporin on the site if it feels dry and itchy.  His insurance covers 4 per year.      He is not having GERD symptoms.  Taking 4-5 Zenpep with meals.  Drinks 2 snacks per day (2 Boost) - takes 2-3 Zenpep with the Boost.    Past Medical History:  11/08/2016: Influenza due to other identified influenza virus with   other respiratory manifestations      Comment:  Influenza A  10/18/2021: Other specified respiratory disorders      Comment:  Pseudomonas respiratory infection         Current Outpatient Medications:     bisacodyl (Dulcolax) 5 mg EC  tablet, Take 3 tablets (15 mg) by mouth 1 time for 1 dose. Do not crush, chew, or split.  Take as directed the day prior to colonoscopy., Disp: 3 tablet, Rfl: 0    calcium citrate-vitamin D3 (Citracal+D) 315 mg-5 mcg (200 unit) tablet, Take 2 tablets by mouth once daily., Disp: 180 tablet, Rfl: 3    DEKAs Plus, folic acid, 200 mcg-1,000 mcg-10 mg capsule, Take 2 capsules by mouth once daily., Disp: , Rfl:     elexacaftor-tezacaftor-ivacaft (Trikafta) 100-50-75 mg tablet, TAKE TWO (2) ORANGE TABLETS BY MOUTH IN THE MORNING AND ONE (1) BLUE TABLET BY MOUTH IN THE EVENING. TAKE 12 HOURS APART WITH FATTY FOODS., Disp: 84 each, Rfl: 11    lipase-protease-amylase (Zenpep) 20,000-63,000- 84,000 unit capsule, Take 6 capsules by mouth 3 times a day with meals. May also take 3 capsules with snacks for snacks., Disp: 540 capsule, Rfl: 11    omeprazole (PriLOSEC) 40 mg DR capsule, Take 1 capsule (40 mg) by mouth once daily in the morning. Take before meals., Disp: 90 capsule, Rfl: 3    polyethylene glycol-electrolytes (GaviLyte-G) 420 gram solution, Take 8,000 mL by mouth 1 time for 1 dose. Take as directed per CF specific bowel prep instructions.  Take part of prep the day of procedure starting 5 hours prior to procedure, finishing 2-3 hours before procedure., Disp: 8000 mL, Rfl: 0    Past Medical History:  Past Medical History:   Diagnosis Date    Influenza due to other identified influenza virus with other respiratory manifestations 11/08/2016    Influenza A    Other specified respiratory disorders 10/18/2021    Pseudomonas respiratory infection       Past Surgical History:  Past Surgical History:   Procedure Laterality Date    GASTROSTOMY TUBE PLACEMENT  08/15/2018    Percutaneous Placement Of Gastrostomy Tube       Family History:  No family history on file.     Vitals  There were no vitals taken for this visit.       11/28/2022    12:34 PM 3/13/2023    11:09 AM 6/12/2023    10:38 AM 9/13/2023     1:40 PM 12/6/2023      "2:17 PM 1/16/2024     9:36 AM 3/11/2024     9:35 AM   Vitals   Systolic 111 122 119 112 103  112   Diastolic 72 86 77 65 66  70   Heart Rate 69 70 67 65 66  78   Temp 36.2 °C (97.2 °F) 36.9 °C (98.5 °F) 36.8 °C (98.3 °F) 36.7 °C (98.1 °F) 36.8 °C (98.3 °F)  36.4 °C (97.6 °F)   Resp 18 17 18 17 20  18   Height (in)  1.751 m (5' 8.94\") 1.751 m (5' 8.94\") 1.751 m (5' 8.94\") 1.75 m (5' 8.9\")  1.75 m (5' 8.9\")   Weight (lb) 149.47 151.35 151.9 153.99 148.26 151.4 151.24   BMI 22.11 kg/m2 22.39 kg/m2 22.47 kg/m2 22.78 kg/m2 21.96 kg/m2 22.42 kg/m2 22.4 kg/m2   BSA (m2) 1.82 m2 1.83 m2 1.83 m2 1.84 m2 1.81 m2 1.83 m2 1.83 m2   Visit Report       Report    Report       Physical Exam:  NAD  No scleral icterus  Oropharynx clear  Lungs CTA bilaterally  RRR S1 S2  Abd soft, nontender no HSM gastrostomy LEILANI-KEY 24 Fr 2.5 cm with site with mild erythema  No edema    Labs:    WBC   Date/Time Value Ref Range Status   03/11/2024 09:51 AM 5.1 4.4 - 11.3 x10*3/uL Final     Hemoglobin   Date/Time Value Ref Range Status   03/11/2024 09:51 AM 14.5 13.5 - 17.5 g/dL Final     Hematocrit   Date/Time Value Ref Range Status   03/11/2024 09:51 AM 45.3 41.0 - 52.0 % Final     MCV   Date/Time Value Ref Range Status   03/11/2024 09:51 AM 97 80 - 100 fL Final     Platelets   Date/Time Value Ref Range Status   03/11/2024 09:51  150 - 450 x10*3/uL Final        Sodium   Date/Time Value Ref Range Status   03/13/2023 12:35  136 - 145 mmol/L Final     Potassium   Date/Time Value Ref Range Status   03/13/2023 12:35 PM 4.2 3.5 - 5.3 mmol/L Final     Chloride   Date/Time Value Ref Range Status   03/13/2023 12:35  98 - 107 mmol/L Final     Urea Nitrogen   Date/Time Value Ref Range Status   03/13/2023 12:35 PM 24 (H) 6 - 23 mg/dL Final     Creatinine   Date/Time Value Ref Range Status   03/13/2023 12:35 PM 0.72 0.50 - 1.30 mg/dL Final     Glucose   Date/Time Value Ref Range Status   03/13/2023 12:35 PM 98 74 - 99 mg/dL Final       AST "   Date/Time Value Ref Range Status   03/13/2023 12:35 PM 35 9 - 39 U/L Final     ALT (SGPT)   Date/Time Value Ref Range Status   03/13/2023 12:35 PM 60 (H) 10 - 52 U/L Final     Comment:      Patients treated with Sulfasalazine may generate    falsely decreased results for ALT.       Alkaline Phosphatase   Date/Time Value Ref Range Status   03/13/2023 12:35  (H) 33 - 120 U/L Final     Total Bilirubin   Date/Time Value Ref Range Status   03/13/2023 12:35 PM 0.9 0.0 - 1.2 mg/dL Final     Bilirubin, Direct   Date/Time Value Ref Range Status   03/13/2023 12:35 PM 0.2 0.0 - 0.3 mg/dL Final     GGT   Date/Time Value Ref Range Status   03/13/2023 12:35 PM 26 5 - 64 U/L Final     Albumin   Date/Time Value Ref Range Status   03/13/2023 12:35 PM 4.4 3.4 - 5.0 g/dL Final     Total Protein   Date/Time Value Ref Range Status   03/13/2023 12:35 PM 6.9 6.4 - 8.2 g/dL Final        Protime   Date/Time Value Ref Range Status   09/12/2022 02:40 PM 11.3 9.8 - 13.4 sec Final     INR   Date/Time Value Ref Range Status   09/12/2022 02:40 PM 1.0 0.9 - 1.1 Final        Vitamin D, 25-Hydroxy   Date/Time Value Ref Range Status   03/13/2023 12:35 PM 45 ng/mL Final     Comment:     .  DEFICIENCY:         < 20   NG/ML  INSUFFICIENCY:      20-29  NG/ML  SUFFICIENCY:         NG/ML    THIS ASSAY ACCURATELY QUANTIFIES THE SUM OF  VITAMIN D3, 25-HYDROXY AND VIT D2,25-HYDROXY.       Vitamin A (Retinol)   Date/Time Value Ref Range Status   03/13/2023 12:35 PM 0.60 0.30 - 1.20 mg/L Final     Vitamin A, Interpretation   Date/Time Value Ref Range Status   03/13/2023 12:35 PM Normal  Final     Comment:     This test was developed and its performance characteristics   determined by [facility]. It has not been cleared or   approved by the US Food and Drug Administration. This test was   performed in a CLIA certified laboratory and is intended for   clinical purposes.  Performed By: [facility]  [address] [place], [place]  "[id number]  : Henrry Chapa MD, PhD       Vitamin E (Alpha-Tocopherol)   Date/Time Value Ref Range Status   2023 12:35 PM 13.5 5.5 - 18.0 mg/L Final     Comment:     This test was developed and its performance characteristics   determined by [facility]. It has not been cleared or   approved by the US Food and Drug Administration. This test was   performed in a CLIA certified laboratory and is intended for   clinical purposes.       Vitamin E (Gamma-Tocopherol)   Date/Time Value Ref Range Status   2023 12:35 PM 1.0 0.0 - 6.0 mg/L Final     Comment:     Performed By: [facility]  [address] [place] [id number]  : Henrry Chapa MD, PhD          No results found for: \"PANCRELASTFE\"    Ferritin   Date/Time Value Ref Range Status   2022 02:40 PM 63 20 - 300 ug/L Final     Iron   Date/Time Value Ref Range Status   2022 02:40  35 - 150 ug/dL Final     TIBC   Date/Time Value Ref Range Status   2022 02:40  240 - 445 ug/dL Final     Iron Saturation   Date/Time Value Ref Range Status   2022 02:40 PM 28 25 - 45 % Final       Imagin/21/11 Gastric emptying study     FINDINGS: The image series shows immediate accumulation of activity   within the stomach fundus with subsequent activity seen in the antrum   and subsequently small bowel. Computer quantification demonstrates   22% retention at one hour (normal < 90% and > 30%), 11% retention at   2 hours (normal < 60%), 5% retention at 3 hours (normal < 30%), and   3% gastric retention at 4 hours (normal < 10% ).     IMPRESSION:   Rapid gastric emptying times with minimal activity remaining in the   gastric antrum at 4 hours.     21 Abdominal ultrasound with Dopplers  FINDINGS:  LIVER:  The liver is normal in size, 14.8 cm in maximum dimension. The  hepatic parenchyma is homogeneous and demonstrates an expected  echotexture. No focal hepatic lesions are " identified.     GALLBLADDER:  The gallbladder is nondistended, and demonstrates no evidence of  gallstones, wall thickening or surrounding fluid. Sonographic  Cui's sign is reportedly negative.     BILIARY SYSTEM:  No evidence of intra or extrahepatic biliary dilatation is  identified; the common bile duct measures 2 mm.     DOPPLER EVALUATION:     HEPATIC ARTERIES:  Hepatic artery and its right and left branches RI's are estimated at  0.71, 0.72 and 0.66, respectively.     PORTAL VEIN:  Portal vein is patent and measures 9 mm. There is normal respiratory  variation. Portal vein velocities are calculated as follows: main  portal vein 30.5 cm/sec, antegrade flow; left portal vein branch 14.9  cm/sec, antegrade flow; right portal vein anterior branch 17.9  cm/sec, antegrade flow; right portal vein posterior branch 24.3  cm/sec, antegrade flow. The splenic vein is also patent.     HEPATIC VEIN:  The right, middle and left hepatic veins are patent and demonstrate  triphasic antegrade flow. IVC appears also patent.     PANCREAS:  The visualized pancreas is unremarkable in appearance.     RIGHT KIDNEY:  The right kidney measures 11.1 cm in length. No hydronephrosis or  renal calculi are seen.     SPLEEN:  The spleen measures 9.8 cm in length and is grossly unremarkable.     PERITONEUM AND RETROPERITONEUM:  There is no free or loculated fluid seen in the abdomen.     IMPRESSION:  Unremarkable ultrasound of the liver including Doppler the evaluation.      Previous Endoscopic Evaluation:  7/12/18 Colonoscopy - BBPS 9; internal hemorrhoids     Assessment and Plan:  Patient is a 43 y.o. year old male with a PMH significant for cystic fibrosis (genotype [id number]/1898+1G->A)    on elexacaftor/tezacaftor/ivacaftor, h/o exocrine pancreatic insufficiency, h/o gastrostomy tube placement here for followup.     Increased risk CRC - CF Foundation recommends screening colonoscopy for patients with CF age >=40 years old.  Colonoscopy  is the recommended screening modality due to lack of studies using noninvasive stool-based testing.  Patient wishes to proceed.  Patient also agreeable to participating in [facility].  Order placed for colonoscopy with Moderate sedation based on  patient preference and tolerance of last procedure with moderate sedation.  Patient was sent [facility] [place] prep instructions to [facility].  Prep prescriptions sent to patient pharmacy.   2.   Exocrine pancreatic insufficiency - Continue Zenpep.  Current dose supplies up to 1458 units lipase/kg/meal based on today's weight.  3.   Gastrostomy status - Patient wants to keep G tube in for now in case he gets sick.  Weight has been stable without using it past few months.  Leaking improved after switching back to LEILANI-KEY.  Current tube is LEILANI-KEY 24 Fr 2.5 cm.  Can continue to apply Neosporin to site as needed, or can try petroleum jelly.  4.   H/o abnormal LFTs - Discussed that patient has had elevation of alk phos and ALT on labwork from March 2023 and prior to that had elevation of bilirubin in 2012.  I had ordered ultrasound last July which has not been done.  Patient would like to get colonoscopy done next and wants me to order ultrasound at time of colonoscopy visit.  Suggested patient could potentially have ultrasound done when he comes for next in person CF center visit.      Follow up after colonoscopy.    Aleena Johnson MD, MS  Gastroenterologist

## 2024-03-11 NOTE — PROGRESS NOTES
RD completed SEBLE today.    SMM 67.2 (fit), PBF 18.6%    SMM and PBF both increased. Pt. saw PT today. RD recommended maintaining muscle for lung function. RD discussed maintaining/increasing exercise as able. Pt. reports that he had reduced exercise, but plans to increase again.

## 2024-03-11 NOTE — PROGRESS NOTES
Peripheral venipuncture for labs. Procedure followed per protocal for blood draw. Patient's skin prepared, Lt AC Lab specimen obtained per providers orders.  RN applied clean dry adhesive dressing to exit site.  Pt tolerated the procedure with no problems. Advised pt to keep the dressing intact for at least an hour after the draw.

## 2024-03-11 NOTE — PATIENT INSTRUCTIONS
"    It was very nice to see you today. We can offer you in-person and \"virtual\" appointments with your cystic fibrosis provider.    If you need to cancel or schedule an appointment, please call the  at the [facility] at [phone] between the hours of 8 AM and 5 PM, Monday-Friday.    To reach the CF nurse, Abby, please call [phone]. Abby has a secure voice mail account if you want to leave a message.    If you need to speak with an adult cystic fibrosis provider between the hours of 5 PM and 8 AM (overnight) or anytime on Saturday or Sunday, please call [phone]. When you call this number, you will be connected to an  with the [facility] and [facility] answering service. You should tell the  that you're an adult with cystic fibrosis who needs to speak to the \"cystic fibrosis doctor on-call.\" The  will take your contact information. You should then expect a call back from the cystic fibrosis doctor on-call within a short period of time.      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     I can't add-on the testosterone level to your blood work today because we did not have the proper tube. We can check next time when you get your oral glucose tolerance test.  Stop azithromycin.  We will continue the feeding tube for now. Let us know if you change your mind about having it removed.    Next appointment: 4 months; you should be prepared to have your 2-hour oral glucose tolerance test done at that point.  " No

## 2024-03-13 LAB
A-TOCOPHEROL VIT E SERPL-MCNC: 10.5 MG/L (ref 5.5–18)
ANNOTATION COMMENT IMP: NORMAL
BACTERIA SPT CF RESP CULT: NORMAL
BETA+GAMMA TOCOPHEROL SERPL-MCNC: 1.2 MG/L (ref 0–6)
RETINYL PALMITATE SERPL-MCNC: 0.05 MG/L (ref 0–0.1)
SCAN RESULT: NORMAL
VIT A SERPL-MCNC: 0.48 MG/L (ref 0.3–1.2)

## 2024-03-22 ENCOUNTER — SPECIALTY PHARMACY (OUTPATIENT)
Dept: PHARMACY | Facility: CLINIC | Age: 44
End: 2024-03-22

## 2024-03-28 DIAGNOSIS — Z00.6 RESEARCH STUDY PATIENT: Primary | ICD-10-CM

## 2024-03-29 ENCOUNTER — HOSPITAL ENCOUNTER (OUTPATIENT)
Dept: RESPIRATORY THERAPY | Facility: HOSPITAL | Age: 44
Discharge: HOME | End: 2024-03-29
Payer: MEDICARE

## 2024-03-29 ENCOUNTER — HOSPITAL ENCOUNTER (OUTPATIENT)
Dept: RESEARCH | Facility: HOSPITAL | Age: 44
Discharge: HOME | End: 2024-03-29
Payer: MEDICARE

## 2024-03-29 DIAGNOSIS — Z00.6 RESEARCH SUBJECT: ICD-10-CM

## 2024-03-29 LAB
FEF 25-75: 2.3 L/S
FEV1/FVC: 70 %
FEV1: 3.29 LITERS
FVC: 4.69 LITERS
PEF: 5.98 L/S

## 2024-03-29 PROCEDURE — RXMED WILLOW AMBULATORY MEDICATION CHARGE

## 2024-03-29 NOTE — RESEARCH NOTES
WB, Left hit, and AP Spine DXA completed per protocol NA-2771.   H Plasty Text: Given the location of the defect, shape of the defect and the proximity to free margins a H-plasty was deemed most appropriate for repair.  Using a sterile surgical marker, the appropriate advancement arms of the H-plasty were drawn incorporating the defect and placing the expected incisions within the relaxed skin tension lines where possible. The area thus outlined was incised deep to adipose tissue with a #15 scalpel blade. The skin margins were undermined to an appropriate distance in all directions utilizing iris scissors.  The opposing advancement arms were then advanced into place in opposite direction and anchored with interrupted buried subcutaneous sutures.

## 2024-04-02 DIAGNOSIS — Z00.6 RESEARCH STUDY PATIENT: Primary | ICD-10-CM

## 2024-04-03 ENCOUNTER — PHARMACY VISIT (OUTPATIENT)
Dept: PHARMACY | Facility: CLINIC | Age: 44
End: 2024-04-03
Payer: COMMERCIAL

## 2024-04-29 ENCOUNTER — SPECIALTY PHARMACY (OUTPATIENT)
Dept: PHARMACY | Facility: CLINIC | Age: 44
End: 2024-04-29

## 2024-04-29 PROCEDURE — RXMED WILLOW AMBULATORY MEDICATION CHARGE

## 2024-05-02 ENCOUNTER — PHARMACY VISIT (OUTPATIENT)
Dept: PHARMACY | Facility: CLINIC | Age: 44
End: 2024-05-02
Payer: COMMERCIAL

## 2024-05-24 ENCOUNTER — SPECIALTY PHARMACY (OUTPATIENT)
Dept: PHARMACY | Facility: CLINIC | Age: 44
End: 2024-05-24

## 2024-05-24 PROCEDURE — RXMED WILLOW AMBULATORY MEDICATION CHARGE

## 2024-05-28 LAB
FEF 25-75: 2.18 L/S
FEV1/FVC: 70 %
FEV1: 3.42 LITERS
FVC: 4.89 LITERS
PEF: 6.47 L/S

## 2024-05-29 ENCOUNTER — PHARMACY VISIT (OUTPATIENT)
Dept: PHARMACY | Facility: CLINIC | Age: 44
End: 2024-05-29
Payer: COMMERCIAL

## 2024-06-03 ENCOUNTER — APPOINTMENT (OUTPATIENT)
Dept: RESPIRATORY THERAPY | Facility: HOSPITAL | Age: 44
End: 2024-06-03
Payer: MEDICARE

## 2024-06-03 ENCOUNTER — HOSPITAL ENCOUNTER (OUTPATIENT)
Dept: GASTROENTEROLOGY | Facility: HOSPITAL | Age: 44
Setting detail: OUTPATIENT SURGERY
Discharge: HOME | End: 2024-06-03
Payer: MEDICARE

## 2024-06-03 VITALS
WEIGHT: 150 LBS | SYSTOLIC BLOOD PRESSURE: 119 MMHG | RESPIRATION RATE: 18 BRPM | OXYGEN SATURATION: 97 % | TEMPERATURE: 97 F | BODY MASS INDEX: 22.22 KG/M2 | HEIGHT: 69 IN | HEART RATE: 48 BPM | DIASTOLIC BLOOD PRESSURE: 83 MMHG

## 2024-06-03 DIAGNOSIS — Z12.11 COLON CANCER SCREENING: ICD-10-CM

## 2024-06-03 DIAGNOSIS — R79.89 ABNORMAL LIVER FUNCTION TESTS: ICD-10-CM

## 2024-06-03 DIAGNOSIS — E84.19 CYSTIC FIBROSIS WITH GASTROINTESTINAL MANIFESTATIONS (MULTI): Primary | ICD-10-CM

## 2024-06-03 LAB — GLUCOSE BLD MANUAL STRIP-MCNC: 85 MG/DL (ref 74–99)

## 2024-06-03 PROCEDURE — 2500000004 HC RX 250 GENERAL PHARMACY W/ HCPCS (ALT 636 FOR OP/ED): Mod: SE | Performed by: INTERNAL MEDICINE

## 2024-06-03 PROCEDURE — 82947 ASSAY GLUCOSE BLOOD QUANT: CPT

## 2024-06-03 PROCEDURE — 0753T DGTZ GLS MCRSCP SLD LEVEL IV: CPT | Mod: TC,SUR | Performed by: INTERNAL MEDICINE

## 2024-06-03 PROCEDURE — 45385 COLONOSCOPY W/LESION REMOVAL: CPT | Performed by: INTERNAL MEDICINE

## 2024-06-03 PROCEDURE — 3700000013 HC SEDATION LEVEL 5+ TIME - EACH ADDITIONAL 15 MINUTES

## 2024-06-03 PROCEDURE — 88305 TISSUE EXAM BY PATHOLOGIST: CPT | Performed by: PATHOLOGY

## 2024-06-03 PROCEDURE — 7100000010 HC PHASE TWO TIME - EACH INCREMENTAL 1 MINUTE

## 2024-06-03 PROCEDURE — 7100000009 HC PHASE TWO TIME - INITIAL BASE CHARGE

## 2024-06-03 PROCEDURE — 3700000012 HC SEDATION LEVEL 5+ TIME - INITIAL 15 MINUTES 5/> YEARS

## 2024-06-03 RX ORDER — MIDAZOLAM HYDROCHLORIDE 1 MG/ML
INJECTION, SOLUTION INTRAMUSCULAR; INTRAVENOUS AS NEEDED
Status: COMPLETED | OUTPATIENT
Start: 2024-06-03 | End: 2024-06-03

## 2024-06-03 RX ORDER — DIPHENHYDRAMINE HYDROCHLORIDE 50 MG/ML
INJECTION INTRAMUSCULAR; INTRAVENOUS AS NEEDED
Status: COMPLETED | OUTPATIENT
Start: 2024-06-03 | End: 2024-06-03

## 2024-06-03 RX ORDER — SODIUM CHLORIDE, SODIUM LACTATE, POTASSIUM CHLORIDE, CALCIUM CHLORIDE 600; 310; 30; 20 MG/100ML; MG/100ML; MG/100ML; MG/100ML
20 INJECTION, SOLUTION INTRAVENOUS CONTINUOUS
Status: DISCONTINUED | OUTPATIENT
Start: 2024-06-03 | End: 2024-06-04 | Stop reason: HOSPADM

## 2024-06-03 RX ORDER — FENTANYL CITRATE 50 UG/ML
INJECTION, SOLUTION INTRAMUSCULAR; INTRAVENOUS AS NEEDED
Status: COMPLETED | OUTPATIENT
Start: 2024-06-03 | End: 2024-06-03

## 2024-06-03 RX ADMIN — DIPHENHYDRAMINE HYDROCHLORIDE 25 MG: 50 INJECTION, SOLUTION INTRAMUSCULAR; INTRAVENOUS at 11:01

## 2024-06-03 RX ADMIN — MIDAZOLAM HYDROCHLORIDE 2 MG: 1 INJECTION, SOLUTION INTRAMUSCULAR; INTRAVENOUS at 10:49

## 2024-06-03 RX ADMIN — FENTANYL CITRATE 25 MCG: 50 INJECTION, SOLUTION INTRAMUSCULAR; INTRAVENOUS at 10:53

## 2024-06-03 RX ADMIN — FENTANYL CITRATE 25 MCG: 50 INJECTION, SOLUTION INTRAMUSCULAR; INTRAVENOUS at 11:11

## 2024-06-03 RX ADMIN — MIDAZOLAM HYDROCHLORIDE 1 MG: 1 INJECTION, SOLUTION INTRAMUSCULAR; INTRAVENOUS at 11:11

## 2024-06-03 RX ADMIN — MIDAZOLAM HYDROCHLORIDE 1 MG: 1 INJECTION, SOLUTION INTRAMUSCULAR; INTRAVENOUS at 10:53

## 2024-06-03 RX ADMIN — MIDAZOLAM HYDROCHLORIDE 1 MG: 1 INJECTION, SOLUTION INTRAMUSCULAR; INTRAVENOUS at 10:57

## 2024-06-03 RX ADMIN — MIDAZOLAM HYDROCHLORIDE 1 MG: 1 INJECTION, SOLUTION INTRAMUSCULAR; INTRAVENOUS at 11:01

## 2024-06-03 RX ADMIN — FENTANYL CITRATE 25 MCG: 50 INJECTION, SOLUTION INTRAMUSCULAR; INTRAVENOUS at 10:57

## 2024-06-03 RX ADMIN — FENTANYL CITRATE 50 MCG: 50 INJECTION, SOLUTION INTRAMUSCULAR; INTRAVENOUS at 10:49

## 2024-06-03 ASSESSMENT — PAIN - FUNCTIONAL ASSESSMENT

## 2024-06-03 ASSESSMENT — PAIN SCALES - GENERAL

## 2024-06-03 ASSESSMENT — COLUMBIA-SUICIDE SEVERITY RATING SCALE - C-SSRS
1. IN THE PAST MONTH, HAVE YOU WISHED YOU WERE DEAD OR WISHED YOU COULD GO TO SLEEP AND NOT WAKE UP?: NO
6. HAVE YOU EVER DONE ANYTHING, STARTED TO DO ANYTHING, OR PREPARED TO DO ANYTHING TO END YOUR LIFE?: NO
2. HAVE YOU ACTUALLY HAD ANY THOUGHTS OF KILLING YOURSELF?: NO

## 2024-06-03 NOTE — DISCHARGE INSTRUCTIONS

## 2024-06-03 NOTE — H&P
"History Of Present Illness  Froy Parisi is a 43 y.o. male presenting with screening colonoscopy.  Last colonoscopy 2018 no polyps.  Patient has h/o CF.     Past Medical History  Past Medical History:   Diagnosis Date    Influenza due to other identified influenza virus with other respiratory manifestations 11/08/2016    Influenza A    Other specified respiratory disorders 10/18/2021    Pseudomonas respiratory infection     Surgical History  Past Surgical History:   Procedure Laterality Date    GASTROSTOMY TUBE PLACEMENT  08/15/2018    Percutaneous Placement Of Gastrostomy Tube     Social History  He reports that he has never smoked. He has never used smokeless tobacco. He reports current drug use. Drug: Marijuana. He reports that he does not drink alcohol.    Family History  Family History   Problem Relation Name Age of Onset    Esophageal cancer Neg Hx      Stomach cancer Neg Hx      Colon cancer Neg Hx          Allergies  No Known Allergies  Review of Systems  Pre-sedation Evaluation:  ASA Classification - ASA 3 - Patient with moderate systemic disease with functional limitations  Mallampati Score - II (hard and soft palate, upper portion of tonsils and uvula visible)    Physical Exam  Cardiovascular:      Rate and Rhythm: Normal rate and regular rhythm.   Pulmonary:      Effort: Pulmonary effort is normal.      Breath sounds: Normal breath sounds.   Abdominal:      General: Abdomen is flat.      Palpations: Abdomen is soft.          Last Recorded Vitals  Blood pressure 115/86, pulse 52, temperature 36.1 °C (97 °F), resp. rate 20, height 1.753 m (5' 9\"), weight 68 kg (150 lb), SpO2 97%.     Assessment/Plan   Screening colonoscopy     PTA/Current Medications:  (Not in a hospital admission)    Current Outpatient Medications   Medication Sig Dispense Refill    DEKAs Plus, folic acid, 200 mcg-1,000 mcg-10 mg capsule Take 2 capsules by mouth once daily.      elexacaftor-tezacaftor-ivacaft (Trikafta) 100-50-75 mg " tablet TAKE TWO (2) ORANGE TABLETS BY MOUTH IN THE MORNING AND ONE (1) BLUE TABLET BY MOUTH IN THE EVENING. TAKE 12 HOURS APART WITH FATTY FOODS. 84 each 11    lipase-protease-amylase (Zenpep) 20,000-63,000- 84,000 unit capsule Take 6 capsules by mouth 3 times a day with meals. May also take 3 capsules with snacks for snacks. 540 capsule 11    omeprazole (PriLOSEC) 40 mg DR capsule Take 1 capsule (40 mg) by mouth once daily in the morning. Take before meals. 90 capsule 3    calcium citrate-vitamin D3 (Citracal+D) 315 mg-5 mcg (200 unit) tablet Take 2 tablets by mouth once daily. (Patient not taking: Reported on 6/3/2024) 180 tablet 3     Current Facility-Administered Medications   Medication Dose Route Frequency Provider Last Rate Last Admin    lactated Ringer's infusion  20 mL/hr intravenous Continuous MD Aleena Dockery MD

## 2024-06-04 ASSESSMENT — PAIN SCALES - GENERAL: PAINLEVEL_OUTOF10: 0 - NO PAIN

## 2024-06-10 ENCOUNTER — NUTRITION (OUTPATIENT)
Dept: PEDIATRIC PULMONOLOGY | Facility: HOSPITAL | Age: 44
End: 2024-06-10

## 2024-06-10 ENCOUNTER — MULTIDISCIPLINARY VISIT (OUTPATIENT)
Dept: GASTROENTEROLOGY | Facility: HOSPITAL | Age: 44
End: 2024-06-10
Payer: MEDICARE

## 2024-06-10 ENCOUNTER — HOSPITAL ENCOUNTER (OUTPATIENT)
Dept: RESPIRATORY THERAPY | Facility: HOSPITAL | Age: 44
Discharge: HOME | End: 2024-06-10
Payer: MEDICARE

## 2024-06-10 VITALS
RESPIRATION RATE: 17 BRPM | OXYGEN SATURATION: 97 % | BODY MASS INDEX: 22.45 KG/M2 | WEIGHT: 151.57 LBS | DIASTOLIC BLOOD PRESSURE: 71 MMHG | HEIGHT: 69 IN | SYSTOLIC BLOOD PRESSURE: 125 MMHG | TEMPERATURE: 98.7 F | HEART RATE: 79 BPM

## 2024-06-10 DIAGNOSIS — Z93.1 GASTROSTOMY STATUS (MULTI): Primary | ICD-10-CM

## 2024-06-10 DIAGNOSIS — Z00.6 RESEARCH STUDY PATIENT: ICD-10-CM

## 2024-06-10 DIAGNOSIS — R79.89 ABNORMAL LFTS (LIVER FUNCTION TESTS): ICD-10-CM

## 2024-06-10 DIAGNOSIS — E84.19 CYSTIC FIBROSIS WITH GASTROINTESTINAL MANIFESTATIONS (MULTI): ICD-10-CM

## 2024-06-10 LAB
FEF 25-75: 2.32 L/S
FEV1/FVC: 70 %
FEV1: 3.31 LITERS
FVC: 4.72 LITERS
PEF: 6.08 L/S

## 2024-06-10 PROCEDURE — 99215 OFFICE O/P EST HI 40 MIN: CPT | Performed by: INTERNAL MEDICINE

## 2024-06-10 RX ORDER — BUTALB/ACETAMINOPHEN/CAFFEINE 50-325-40
2 TABLET ORAL DAILY
Qty: 180 TABLET | Refills: 3 | Status: SHIPPED | OUTPATIENT
Start: 2024-06-10 | End: 2025-06-10

## 2024-06-10 NOTE — Clinical Note
Patient is requesting G tube removal.  Given his experience changing tube at home I think he can do this himself once he has returned from a trip June 22.  Tentative plan, unless you have an objection to removal, is for patient to remove tube around June 22 and follow up in clinic July 8.

## 2024-06-10 NOTE — PROGRESS NOTES
"Cystic Fibrosis Nutrition Consult  Subjective    Accident, [place] trip next week    Nutrition Concerns/Questions:  Looser stools after colonscopy, but starting to return to normal    Exercise: Walking, kayaking    Anthropometrics:  BMI      Estimated body mass index is 22.37 kg/m² as calculated from the following:    Height as of an earlier encounter on 6/10/24: 1.753 m (5' 9.02\").    Weight as of an earlier encounter on 6/10/24: 68.8 kg (151 lb 9.1 oz).     Highest wt? 71.2 kg  Pt goal for wt: Maintain     Comment:  Pt. Would like to stay around 150#    Seeing GI? Yes      (last appt: 6/10/24)-Plans to remove tube after vacation  Liver US: 2021      Additional Diagnosis  CFRD: Yes  Osteoporosis/penia: Osteopenia  Kidney Stones: No  KIM: In the past  Pancreatitis: No  Fracture: R foot (skateboarding)  Food allergies/intolerances: NKFA  Sinus affecting taste or smell: No  Colonoscopy: Done 6/3/24  Other/describe: None    Current/Past Feeding History:  Appetite: good-Recommended adding more fruit and veg (order w/ at least 1 meal)    Diet Recall: Meals/day: 2-3 Snacks/Day: 1-Eats out due to convenience, live in a house with many people; Cooks more when visits girlfriend in [place]  B: Hashbrown, Sausage McMuffin, Chocolate Milk; Ensure Plus  L: Subway; Leftovers  D: Fast Food/Pizza  Snack: Ice cream; Ensure  Drinks: water, coffee, soda/alcohol rarely    Tube feeds (kcal): G-Tube-plans to remove after vacation, hasn't been giving himself formula since September and maintaining wt.     Supplements: Nutren 2.0, Ensure Plus (1-2/day)  Calcium intake: Calcium Citrate, chocolate milk, cheese  Sodium intake: No-Discussed importance of hydration      CF Specific Vitamins:  DEKAs Plus   Other: None  Acid Blocker: Omeprazole  Modulators (taking w fat/enzymes?): Trikafta (fate and enzymes)  Other PERT medications / Appetite stimulants / insulin / ADHD / CAM: None     Gastrointestinal History:  Enzyme: Brand Zenpep  Dose: " w/meal 4-5     w/snacks: 2-3         Lipase units/kg/meal: 1453.5 Lipase units/kg/day: 5232.6  Enzyme Program: Medicare/Medicaid  Takes enzymes at: End-Recommended at beginning     Adjusting dose for size/fat content? Sometimes-bases on amount of food eaten    GI / Absorption (GI symptom tracker date:   6/10/24)  Sx:     No concerns  # stools/d:  2    Description Normal, sink, formed, brown    CFRD/Endocrine  Endo MD: Chong  Last seen: 9/13/23 (F/U 8/14/24)  Blood sugar range: Not tracking  Lows? No   Reactive Hypoglycemia? No      Objective   Labs:         Fat soluble Vits Measured:  WNL   Liver Enzymes: WNL  DEXA: Repeat 25  Fasting Glu / HgbA1C: 119/5.5  Other: APRI (0.39)    PF Readings from Last 1 Encounters:   No data found for PF       Vitamin D, 25-Hydroxy, Total   Date/Time Value Ref Range Status   03/11/2024 09:51 AM 46 30 - 100 ng/mL Final     Vitamin A (Retinol)   Date/Time Value Ref Range Status   03/11/2024 09:51 AM 0.48 0.30 - 1.20 mg/L Final     Vitamin A, Interpretation   Date/Time Value Ref Range Status   03/11/2024 09:51 AM Normal  Final     Comment:       This test was developed and its performance characteristics   determined by [facility]. It has not been cleared or   approved by the US Food and Drug Administration. This test was   performed in a CLIA certified laboratory and is intended for   clinical purposes.  Performed By: [facility]  [address] [place] [id number]  : Henrry Chapa MD, PhD  CLIA Number: [id number]     Vitamin E (Alpha-Tocopherol)   Date/Time Value Ref Range Status   03/11/2024 09:51 AM 10.5 5.5 - 18.0 mg/L Final     Comment:       This test was developed and its performance characteristics   determined by [facility]. It has not been cleared or   approved by the US Food and Drug Administration. This test was   performed in a CLIA certified laboratory and is intended for   clinical purposes.     Vitamin E  (Gamma-Tocopherol)   Date/Time Value Ref Range Status   03/11/2024 09:51 AM 1.2 0.0 - 6.0 mg/L Final     Comment:     Performed By: [facility]  [address] [place] [address]  : Henrry Chapa MD, PhD  CLIA Number: [id number]         AST   Date/Time Value Ref Range Status   03/11/2024 09:51 AM 28 9 - 39 U/L Final     ALT   Date/Time Value Ref Range Status   03/11/2024 09:51 AM 43 10 - 52 U/L Final     Comment:     Patients treated with Sulfasalazine may generate falsely decreased results for ALT.     Alkaline Phosphatase   Date/Time Value Ref Range Status   03/11/2024 09:51  33 - 120 U/L Final     Bilirubin, Total   Date/Time Value Ref Range Status   03/11/2024 09:51 AM 0.8 0.0 - 1.2 mg/dL Final     Bilirubin, Direct   Date/Time Value Ref Range Status   03/11/2024 09:51 AM 0.1 0.0 - 0.3 mg/dL Final     GGT   Date/Time Value Ref Range Status   03/11/2024 09:51 AM 16 5 - 64 U/L Final     Albumin   Date/Time Value Ref Range Status   03/11/2024 09:51 AM 4.3 3.4 - 5.0 g/dL Final     Total Protein   Date/Time Value Ref Range Status   03/11/2024 09:51 AM 6.4 6.4 - 8.2 g/dL Final       WBC   Date/Time Value Ref Range Status   03/11/2024 09:51 AM 5.1 4.4 - 11.3 x10*3/uL Final     Neutrophils Absolute   Date/Time Value Ref Range Status   03/11/2024 09:51 AM 2.65 1.20 - 7.70 x10*3/uL Final     Comment:     Percent differential counts (%) should be interpreted in the context of the absolute cell counts (cells/uL).     Neutrophils %   Date/Time Value Ref Range Status   03/11/2024 09:51 AM 52.0 40.0 - 80.0 % Final     Lymphocytes %   Date/Time Value Ref Range Status   03/11/2024 09:51 AM 34.8 13.0 - 44.0 % Final     Monocytes %   Date/Time Value Ref Range Status   03/11/2024 09:51 AM 6.3 2.0 - 10.0 % Final     Basophils %   Date/Time Value Ref Range Status   03/11/2024 09:51 AM 1.4 0.0 - 2.0 % Final     Eosinophils Absolute   Date/Time Value Ref Range Status   03/11/2024 09:51 AM  0.27 0.00 - 0.70 x10*3/uL Final     Eosinophils %   Date/Time Value Ref Range Status   03/11/2024 09:51 AM 5.3 0.0 - 6.0 % Final     Hemoglobin   Date/Time Value Ref Range Status   03/11/2024 09:51 AM 14.5 13.5 - 17.5 g/dL Final     Hematocrit   Date/Time Value Ref Range Status   03/11/2024 09:51 AM 45.3 41.0 - 52.0 % Final     RBC   Date/Time Value Ref Range Status   03/11/2024 09:51 AM 4.67 4.50 - 5.90 x10*6/uL Final     MCV   Date/Time Value Ref Range Status   03/11/2024 09:51 AM 97 80 - 100 fL Final     MCHC   Date/Time Value Ref Range Status   03/11/2024 09:51 AM 32.0 32.0 - 36.0 g/dL Final     Platelets   Date/Time Value Ref Range Status   03/11/2024 09:51  150 - 450 x10*3/uL Final       Sodium   Date/Time Value Ref Range Status   03/11/2024 09:51  136 - 145 mmol/L Final     Potassium   Date/Time Value Ref Range Status   03/11/2024 09:51 AM 4.4 3.5 - 5.3 mmol/L Final     Chloride   Date/Time Value Ref Range Status   03/11/2024 09:51  98 - 107 mmol/L Final     Bicarbonate   Date/Time Value Ref Range Status   03/11/2024 09:51 AM 28 21 - 32 mmol/L Final     Anion Gap   Date/Time Value Ref Range Status   03/11/2024 09:51 AM 14 10 - 20 mmol/L Final     Urea Nitrogen   Date/Time Value Ref Range Status   03/11/2024 09:51 AM 17 6 - 23 mg/dL Final     Creatinine   Date/Time Value Ref Range Status   03/11/2024 09:51 AM 0.81 0.50 - 1.30 mg/dL Final     GFR MALE   Date/Time Value Ref Range Status   03/13/2023 12:35 PM >90 >90 mL/min/1.73m2 Final     Comment:      CALCULATIONS OF ESTIMATED GFR ARE PERFORMED   USING THE 2021 CKD-EPI STUDY REFIT EQUATION   WITHOUT THE RACE VARIABLE FOR THE IDMS-TRACEABLE   CREATININE METHODS.    https://jasn.asnjournals.org/content/early/2021/09/22/ASN.[email or web address]       Glucose   Date/Time Value Ref Range Status   03/11/2024 09:51  (H) 74 - 99 mg/dL Final     Calcium   Date/Time Value Ref Range Status   03/11/2024 09:51 AM 9.5 8.6 - 10.6 mg/dL Final     Phosphorus  "  Date/Time Value Ref Range Status   03/11/2024 09:51 AM 3.3 2.5 - 4.9 mg/dL Final     Comment:     The performance characteristics of phosphorus testing in heparinized plasma have been validated by the individual  laboratory site where testing is performed. Testing on heparinized plasma is not approved by the FDA; however, such approval is not necessary.       Protime   Date/Time Value Ref Range Status   09/12/2022 02:40 PM 11.3 9.8 - 13.4 sec Final     INR   Date/Time Value Ref Range Status   09/12/2022 02:40 PM 1.0 0.9 - 1.1 Final       Hemoglobin A1C   Date/Time Value Ref Range Status   03/11/2024 09:51 AM 5.5 see below % Final     Albumin/Creatine Ratio   Date/Time Value Ref Range Status   03/13/2023 12:35 PM SEE COMMENT 0.0 - 30.0 ug/mg crt Final     Comment:     One or more analytes used in this calculation   is outside of the analytical measurement range.  Calculation cannot be performed.       Glucose tolerance test Fasting   Date/Time Value Ref Range Status   03/27/2019 11:35 AM 97 <126 mg/dL Final     Glucose tolerance test Two Hour   Date/Time Value Ref Range Status   03/27/2019 11:35  (A) <200 mg/dL Final       Lab Results   Component Value Date    CHOL 155 03/11/2024    CHOL 178 03/13/2023    CHOL 147 09/12/2022     Lab Results   Component Value Date    HDL 57.7 03/11/2024    HDL 66.4 03/13/2023    HDL 50.2 09/12/2022     Lab Results   Component Value Date    LDLCALC 72 03/11/2024     Lab Results   Component Value Date    TRIG 128 03/11/2024    TRIG 93 03/13/2023    TRIG 115 09/12/2022     No components found for: \"CHOLHDL\"    No results found for this or any previous visit from the past 1000 days.      Assessment/Plan     Assessment:  Nutritional Status Category: moderate    Goals:  Maintain what he's been doing  Incorporate more Fruits and vegetables  Stay hydrated in the hot months    Plan:  At risk BMI  Vitamins WNL  Pt. Has well-managed blood sugars  Pt. Is physically active  Pt. Has low " fruit and vegetable intake    Increased nutrient needs related to metabolic demands of CF and pancreatic insufficiency as evidenced by need for high calorie intake    Recommendations:   Recommend increasing fluids and/or salting foods in the hot months  Recommend ordering fruit or vegetable with at least one meal daily  Recommend maintain/increase level of physical activity  Recommend taking enzymes at the beginning of meals and snacks  Recommend reach out to RD as nutritional concerns arise

## 2024-06-10 NOTE — PATIENT INSTRUCTIONS
We are still waiting for your pathology results.  I will send you a [employer] message once the pathology is back.      I will check with Dr. Chatman regarding removal of G tube but tentatively the plan is that you will remove it once you are back from your trip, around June 22.  Please let us know when you have removed it so that we have the date in our records.    I have refilled the calcium to your [facility] pharmacy.    Follow up with me July 8th in case there is an issue with the tube removal - you can cancel this if the appointment is not needed.

## 2024-06-10 NOTE — PROGRESS NOTES
ESTABLISHED CF GI PATIENT NOTE    Froy Parisi  1980   [id number]     Chief Complaint:   Chief Complaint   Patient presents with    Cystic Fibrosis    Gastrostomy Tube Follow Up      HPI:   Patient is a 43 y.o. year old male with a PMH significant for cystic fibrosis (genotype [id number]/1898+1G->A)    on elexacaftor/tezacaftor/ivacaftor, h/o exocrine pancreatic insufficiency, h/o gastrostomy tube placement here for consideration of removal of gastrostomy stube.   Patient was last seen in CF GI clinic 3/11/24.  At that time, expressed that he wanted to keep the tube in case he got sick but today desires G tube removal.  Has been reading about what to expect with removal.  Had tube placed in his 20's by Dr. Dunlap around the time of a bowel obstruction.  Patient is going on a trip on the 16th and coming back 6/22.  He has not used the tube (other than flushing it or for the bowel prep) since September.  Takes Ensure 1 to 2 per day.    Tube does leak a little bit.  Occasionally has to change out dressing twice a day.  Usually just once a day.    Patient had his colonoscopy last week during which 1 polyp was removed.  Pathology is not yet back.    Past Medical History:  11/08/2016: Influenza due to other identified influenza virus with   other respiratory manifestations      Comment:  Influenza A  10/18/2021: Other specified respiratory disorders      Comment:  Pseudomonas respiratory infection         Current Outpatient Medications:     calcium citrate-vitamin D3 (Citracal+D) 315 mg-5 mcg (200 unit) tablet, Take 2 tablets by mouth once daily., Disp: 180 tablet, Rfl: 3    DEKAs Plus, folic acid, 200 mcg-1,000 mcg-10 mg capsule, Take 2 capsules by mouth once daily., Disp: , Rfl:     elexacaftor-tezacaftor-ivacaft (Trikafta) 100-50-75 mg tablet, TAKE TWO (2) ORANGE TABLETS BY MOUTH IN THE MORNING AND ONE (1) BLUE TABLET BY MOUTH IN THE EVENING. TAKE 12 HOURS APART WITH FATTY FOODS., Disp: 84 each, Rfl: 11     "lipase-protease-amylase (Zenpep) 20,000-63,000- 84,000 unit capsule, Take 6 capsules by mouth 3 times a day with meals. May also take 3 capsules with snacks for snacks., Disp: 540 capsule, Rfl: 11    omeprazole (PriLOSEC) 40 mg DR capsule, Take 1 capsule (40 mg) by mouth once daily in the morning. Take before meals., Disp: 90 capsule, Rfl: 3    Past Medical History:  Past Medical History:   Diagnosis Date    Influenza due to other identified influenza virus with other respiratory manifestations 11/08/2016    Influenza A    Other specified respiratory disorders 10/18/2021    Pseudomonas respiratory infection       Past Surgical History:  Past Surgical History:   Procedure Laterality Date    GASTROSTOMY TUBE PLACEMENT  08/15/2018    Percutaneous Placement Of Gastrostomy Tube       Family History:  Family History   Problem Relation Name Age of Onset    Esophageal cancer Neg Hx      Stomach cancer Neg Hx      Colon cancer Neg Hx          Vitals  Visit Vitals  /71 (BP Location: Right arm, Patient Position: Sitting, BP Cuff Size: Adult)   Pulse 79   Temp 37.1 °C (98.7 °F) (Oral)   Resp 17          6/3/2024    11:41 AM 6/3/2024    11:44 AM 6/3/2024    11:46 AM 6/3/2024    11:53 AM 6/3/2024    12:06 PM 6/3/2024    12:23 PM 6/10/2024     9:25 AM   Vitals   Systolic 109 102 113 114 116 119 125   Diastolic 71 71 74 87 68 83 71   Heart Rate 43 43 40 43 46 48 79   Temp       37.1 °C (98.7 °F)   Resp 14 14 13 18 16 18 17   Height (in)       1.753 m (5' 9.02\")   Weight (lb)       151.57   BMI       22.37 kg/m2   BSA (m2)       1.83 m2     Weight 9/13/23 153 pounds, weight 1/16/24 151 pounds    Physical Exam:  NAD  Oropharynx clear  Lungs CTA bilaterally  RRR S1 S2  Abd soft, NT ND gastrostomy tube intact, gastrostomy site with mild erythema appoximately 1 cm circumferential around site  No LE edema    Labs:    WBC   Date/Time Value Ref Range Status   03/11/2024 09:51 AM 5.1 4.4 - 11.3 x10*3/uL Final     Hemoglobin "   Date/Time Value Ref Range Status   03/11/2024 09:51 AM 14.5 13.5 - 17.5 g/dL Final     Hematocrit   Date/Time Value Ref Range Status   03/11/2024 09:51 AM 45.3 41.0 - 52.0 % Final     MCV   Date/Time Value Ref Range Status   03/11/2024 09:51 AM 97 80 - 100 fL Final     Platelets   Date/Time Value Ref Range Status   03/11/2024 09:51  150 - 450 x10*3/uL Final        Sodium   Date/Time Value Ref Range Status   03/11/2024 09:51  136 - 145 mmol/L Final     Potassium   Date/Time Value Ref Range Status   03/11/2024 09:51 AM 4.4 3.5 - 5.3 mmol/L Final     Chloride   Date/Time Value Ref Range Status   03/11/2024 09:51  98 - 107 mmol/L Final     Urea Nitrogen   Date/Time Value Ref Range Status   03/11/2024 09:51 AM 17 6 - 23 mg/dL Final     Creatinine   Date/Time Value Ref Range Status   03/11/2024 09:51 AM 0.81 0.50 - 1.30 mg/dL Final     Glucose   Date/Time Value Ref Range Status   03/11/2024 09:51  (H) 74 - 99 mg/dL Final       AST   Date/Time Value Ref Range Status   03/11/2024 09:51 AM 28 9 - 39 U/L Final     ALT   Date/Time Value Ref Range Status   03/11/2024 09:51 AM 43 10 - 52 U/L Final     Comment:     Patients treated with Sulfasalazine may generate falsely decreased results for ALT.     Alkaline Phosphatase   Date/Time Value Ref Range Status   03/11/2024 09:51  33 - 120 U/L Final     Bilirubin, Total   Date/Time Value Ref Range Status   03/11/2024 09:51 AM 0.8 0.0 - 1.2 mg/dL Final     Bilirubin, Direct   Date/Time Value Ref Range Status   03/11/2024 09:51 AM 0.1 0.0 - 0.3 mg/dL Final     GGT   Date/Time Value Ref Range Status   03/11/2024 09:51 AM 16 5 - 64 U/L Final     Albumin   Date/Time Value Ref Range Status   03/11/2024 09:51 AM 4.3 3.4 - 5.0 g/dL Final     Total Protein   Date/Time Value Ref Range Status   03/11/2024 09:51 AM 6.4 6.4 - 8.2 g/dL Final        Protime   Date/Time Value Ref Range Status   09/12/2022 02:40 PM 11.3 9.8 - 13.4 sec Final     INR   Date/Time Value Ref  "Range Status   09/12/2022 02:40 PM 1.0 0.9 - 1.1 Final        Vitamin D, 25-Hydroxy, Total   Date/Time Value Ref Range Status   03/11/2024 09:51 AM 46 30 - 100 ng/mL Final     Vitamin A (Retinol)   Date/Time Value Ref Range Status   03/11/2024 09:51 AM 0.48 0.30 - 1.20 mg/L Final     Vitamin A, Interpretation   Date/Time Value Ref Range Status   03/11/2024 09:51 AM Normal  Final     Comment:       This test was developed and its performance characteristics   determined by [facility]. It has not been cleared or   approved by the US Food and Drug Administration. This test was   performed in a CLIA certified laboratory and is intended for   clinical purposes.  Performed By: [facility]  [address] [place] [address]  : Henrry Chapa MD, PhD  CLIA Number: [id number]     Vitamin E (Alpha-Tocopherol)   Date/Time Value Ref Range Status   03/11/2024 09:51 AM 10.5 5.5 - 18.0 mg/L Final     Comment:       This test was developed and its performance characteristics   determined by [facility]. It has not been cleared or   approved by the US Food and Drug Administration. This test was   performed in a CLIA certified laboratory and is intended for   clinical purposes.     Vitamin E (Gamma-Tocopherol)   Date/Time Value Ref Range Status   03/11/2024 09:51 AM 1.2 0.0 - 6.0 mg/L Final     Comment:     Performed By: [facility]  [address] [place] [address]  : Henrry Chapa MD, PhD  CLIA Number: [id number]        No results found for: \"PANCRELASTFE\"    Ferritin   Date/Time Value Ref Range Status   09/12/2022 02:40 PM 63 20 - 300 ug/L Final     Iron   Date/Time Value Ref Range Status   09/12/2022 02:40  35 - 150 ug/dL Final     TIBC   Date/Time Value Ref Range Status   09/12/2022 02:40  240 - 445 ug/dL Final     Iron Saturation   Date/Time Value Ref Range Status   09/12/2022 02:40 PM 28 25 - 45 % Final "       Imagin11 Gastric emptying study     FINDINGS: The image series shows immediate accumulation of activity   within the stomach fundus with subsequent activity seen in the antrum   and subsequently small bowel. Computer quantification demonstrates   22% retention at one hour (normal < 90% and > 30%), 11% retention at   2 hours (normal < 60%), 5% retention at 3 hours (normal < 30%), and   3% gastric retention at 4 hours (normal < 10% ).     IMPRESSION:   Rapid gastric emptying times with minimal activity remaining in the   gastric antrum at 4 hours.     21 Abdominal ultrasound with Dopplers  FINDINGS:  LIVER:  The liver is normal in size, 14.8 cm in maximum dimension. The  hepatic parenchyma is homogeneous and demonstrates an expected  echotexture. No focal hepatic lesions are identified.     GALLBLADDER:  The gallbladder is nondistended, and demonstrates no evidence of  gallstones, wall thickening or surrounding fluid. Sonographic  Cui's sign is reportedly negative.     BILIARY SYSTEM:  No evidence of intra or extrahepatic biliary dilatation is  identified; the common bile duct measures 2 mm.     DOPPLER EVALUATION:     HEPATIC ARTERIES:  Hepatic artery and its right and left branches RI's are estimated at  0.71, 0.72 and 0.66, respectively.     PORTAL VEIN:  Portal vein is patent and measures 9 mm. There is normal respiratory  variation. Portal vein velocities are calculated as follows: main  portal vein 30.5 cm/sec, antegrade flow; left portal vein branch 14.9  cm/sec, antegrade flow; right portal vein anterior branch 17.9  cm/sec, antegrade flow; right portal vein posterior branch 24.3  cm/sec, antegrade flow. The splenic vein is also patent.     HEPATIC VEIN:  The right, middle and left hepatic veins are patent and demonstrate  triphasic antegrade flow. IVC appears also patent.     PANCREAS:  The visualized pancreas is unremarkable in appearance.     RIGHT KIDNEY:  The right kidney measures  11.1 cm in length. No hydronephrosis or  renal calculi are seen.     SPLEEN:  The spleen measures 9.8 cm in length and is grossly unremarkable.     PERITONEUM AND RETROPERITONEUM:  There is no free or loculated fluid seen in the abdomen.     IMPRESSION:  Unremarkable ultrasound of the liver including Doppler the evaluation.    Previous Endoscopic Evaluation:  7/12/18 Colonoscopy - BBPS 9; internal hemorrhoids     6/3/24 Colonoscopy - BBPS 6; normal TI; aphthous ulcer in distal rectum; moderate semi-liquid stool visualized (lavaged with incomplete clearance and fair visualization); 3 mm DC polyp (pathology pending); small internal hemorrhoids    Assessment and Plan:  Patient is a 43 y.o. year old male with (genotype C973azo/1898+1G->A)    on elexacaftor/tezacaftor/ivacaftor, h/o exocrine pancreatic insufficiency, h/o gastrostomy tube placement here for followup.     Gastrostomy tube removal - Patient desires gastrostomy tube removal and has had stable weight for about 9 months without using the tube.  I will check whether Dr. Chatman has any objection but given patient has fairly good lung function, does not have issues with frequent episodes of KIM, and has maintained his weight without tube feeds, I think it is reasonable for patient to remove the tube.  I have also discussed removal with our dietitian Emily Nageotte and she does not have an objection.  Patient is however about to go on a trip to a relatively remote part of [place] and has some concerns about dealing with leaking at the site while he is away.  I think it is reasonable for him to wait until he is back from the trip (anticipates that this will be 6/22) to remove the tube.  He normally changes the tube at home and feels comfortable removing at home (patient lives about an hour's drive away).  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  I explained we typically wait at least 4 weeks to see whether there is persistent leakage before referring the patient to surgery.  Recommended patient apply vaseline to site to protect skin from leakage.  If it ends up being significant may need other measures such as a barrier cream.  I have asked the patient to notify us when he removes the tube.  Tentative plan for patient to see me in clinic when he comes to see Dr. Chatman to check regarding how tube removal has gone.  H/o colon polyp - Had colon polyp removed on recent colonoscopy - pathology pending.  Interval to next colonoscopy will depend on pathology (3 years if adenomatous).  H/o abnormal LFTs - Ultrasound was reordered on 6/3/24; patient will schedule.      Aleena Johnson MD, MS  Gastroenterologist

## 2024-06-11 PROBLEM — D12.6 TUBULAR ADENOMA OF COLON: Status: ACTIVE | Noted: 2024-06-11

## 2024-06-11 LAB
LABORATORY COMMENT REPORT: NORMAL
PATH REPORT.FINAL DX SPEC: NORMAL
PATH REPORT.GROSS SPEC: NORMAL
PATH REPORT.TOTAL CANCER: NORMAL

## 2024-06-13 DIAGNOSIS — Z00.6 RESEARCH SUBJECT: Primary | ICD-10-CM

## 2024-06-19 ENCOUNTER — APPOINTMENT (OUTPATIENT)
Dept: GASTROENTEROLOGY | Facility: HOSPITAL | Age: 44
End: 2024-06-19
Payer: MEDICARE

## 2024-06-19 ENCOUNTER — APPOINTMENT (OUTPATIENT)
Dept: PEDIATRIC PULMONOLOGY | Facility: HOSPITAL | Age: 44
End: 2024-06-19
Payer: MEDICARE

## 2024-06-19 ENCOUNTER — APPOINTMENT (OUTPATIENT)
Dept: RESPIRATORY THERAPY | Facility: HOSPITAL | Age: 44
End: 2024-06-19
Payer: MEDICARE

## 2024-06-21 ENCOUNTER — SPECIALTY PHARMACY (OUTPATIENT)
Dept: PHARMACY | Facility: CLINIC | Age: 44
End: 2024-06-21

## 2024-06-21 PROCEDURE — RXMED WILLOW AMBULATORY MEDICATION CHARGE

## 2024-06-22 ENCOUNTER — PHARMACY VISIT (OUTPATIENT)
Dept: PHARMACY | Facility: CLINIC | Age: 44
End: 2024-06-22
Payer: COMMERCIAL

## 2024-06-28 ENCOUNTER — TELEPHONE (OUTPATIENT)
Dept: PEDIATRIC PULMONOLOGY | Facility: HOSPITAL | Age: 44
End: 2024-06-28
Payer: MEDICARE

## 2024-06-28 VITALS — WEIGHT: 152 LBS | BODY MASS INDEX: 22.44 KG/M2

## 2024-06-28 NOTE — TELEPHONE ENCOUNTER
Pt. Called to say he removed his G-Tube on 6/21/24. Pt. Reports he is not having any major issues.     He has been watching what he eats as to not overeat. He has also been eating and drinking separately to avoid leakage. He does note that he does have some leakage, about the same as with the tube. He notes that Ensure leaks, but other liquids and solid foods are fine.     Pt. Reports that the stoma hasn't closed yet, but it is getting smaller. The tape has been irritating his skin. He has pink around stoma, but not red. He has been avoiding beer until the stoma is healed.     Pt. Reports that his weight has been stable. (68.9 kg).    Pt. Had no further concerns at this time. RD provided contact info and encouraged pt. To reach out as nutritional concerns arise. Pt. Was agreeable.

## 2024-07-08 ENCOUNTER — HOSPITAL ENCOUNTER (OUTPATIENT)
Dept: RESPIRATORY THERAPY | Facility: HOSPITAL | Age: 44
Discharge: HOME | End: 2024-07-08
Payer: MEDICARE

## 2024-07-08 ENCOUNTER — MULTIDISCIPLINARY VISIT (OUTPATIENT)
Dept: GASTROENTEROLOGY | Facility: HOSPITAL | Age: 44
End: 2024-07-08
Payer: MEDICARE

## 2024-07-08 ENCOUNTER — MULTIDISCIPLINARY VISIT (OUTPATIENT)
Dept: PEDIATRIC PULMONOLOGY | Facility: HOSPITAL | Age: 44
End: 2024-07-08
Payer: MEDICARE

## 2024-07-08 ENCOUNTER — NUTRITION (OUTPATIENT)
Dept: PEDIATRIC PULMONOLOGY | Facility: HOSPITAL | Age: 44
End: 2024-07-08
Payer: MEDICARE

## 2024-07-08 VITALS
SYSTOLIC BLOOD PRESSURE: 112 MMHG | TEMPERATURE: 97.8 F | HEART RATE: 64 BPM | BODY MASS INDEX: 21.86 KG/M2 | RESPIRATION RATE: 16 BRPM | OXYGEN SATURATION: 96 % | HEIGHT: 69 IN | WEIGHT: 147.6 LBS | DIASTOLIC BLOOD PRESSURE: 70 MMHG

## 2024-07-08 DIAGNOSIS — K31.6 GASTROCUTANEOUS FISTULA DUE TO GASTROSTOMY TUBE: Primary | ICD-10-CM

## 2024-07-08 DIAGNOSIS — E08.9 DIABETES MELLITUS RELATED TO CF (CYSTIC FIBROSIS) (MULTI): ICD-10-CM

## 2024-07-08 DIAGNOSIS — D12.6 TUBULAR ADENOMA OF COLON: ICD-10-CM

## 2024-07-08 DIAGNOSIS — E84.8 DIABETES MELLITUS RELATED TO CF (CYSTIC FIBROSIS) (MULTI): ICD-10-CM

## 2024-07-08 DIAGNOSIS — R79.89 ABNORMAL LFTS (LIVER FUNCTION TESTS): ICD-10-CM

## 2024-07-08 DIAGNOSIS — K94.23 GASTROSTOMY SITE LEAK (MULTI): Primary | ICD-10-CM

## 2024-07-08 DIAGNOSIS — E84.9 CYSTIC FIBROSIS (MULTI): ICD-10-CM

## 2024-07-08 DIAGNOSIS — Z86.010 HX OF ADENOMATOUS POLYP OF COLON: ICD-10-CM

## 2024-07-08 LAB
FEF 25-75: 2.95 L/S
FEV1/FVC: 74 %
FEV1: 3.64 LITERS
FVC: 4.89 LITERS
PEF: 7.02 L/S

## 2024-07-08 PROCEDURE — 87070 CULTURE OTHR SPECIMN AEROBIC: CPT | Performed by: INTERNAL MEDICINE

## 2024-07-08 PROCEDURE — 99213 OFFICE O/P EST LOW 20 MIN: CPT | Performed by: INTERNAL MEDICINE

## 2024-07-08 PROCEDURE — 99214 OFFICE O/P EST MOD 30 MIN: CPT | Mod: 25 | Performed by: INTERNAL MEDICINE

## 2024-07-08 PROCEDURE — 94010 BREATHING CAPACITY TEST: CPT

## 2024-07-08 RX ORDER — LANCETS 33 GAUGE
EACH MISCELLANEOUS
COMMUNITY
Start: 2023-08-03

## 2024-07-08 RX ORDER — LANSOPRAZOLE 30 MG/1
40 CAPSULE, DELAYED RELEASE ORAL
COMMUNITY
Start: 2013-10-12 | End: 2024-07-08 | Stop reason: ALTCHOICE

## 2024-07-08 ASSESSMENT — ENCOUNTER SYMPTOMS
OCCASIONAL FEELINGS OF UNSTEADINESS: 0
LOSS OF SENSATION IN FEET: 0
DEPRESSION: 0

## 2024-07-08 NOTE — PROGRESS NOTES
ESTABLISHED CF GI PATIENT NOTE    Froy Parisi  1980   [id number]     Chief Complaint:   Chief Complaint   Patient presents with    Gastrostomy Tube Follow Up    Cystic Fibrosis       HPI:   Patient is a 43 y.o. year old male with a PMH significant for cystic fibrosis (genotype [id number]/1898+1G->A)    on elexacaftor/tezacaftor/ivacaftor here for followup after gastrostomy tube removal.  Patient was last seen in CF GI clinic 6/10/24.    In interim, went on vacation to [place].  Had a good trip despite rain.  Removed tube after he came back, on 6/21/24 at nighttime.  Did not leak that night.  Kept stoma mostly covered for the first week, no longer leaking as much so no longer keeping it covered.  Has some leaking every day.  Leaking is slowing down but he does have leaking especially with Boost.  Has not been taking Boost in evening due to leaking at G tube site.  Normally was taking Boost twice a day before removal of gastrostomy tube.  Food does not leak much unless he eats and drinks at the same time.      Redness has improved over time.  Feels he is still drinking plenty of fluid.  Went swimming yesterday.    Past Medical History:  11/08/2016: Influenza due to other identified influenza virus with   other respiratory manifestations      Comment:  Influenza A  10/18/2021: Other specified respiratory disorders      Comment:  Pseudomonas respiratory infection  06/11/2024: Tubular adenoma of colon         Current Outpatient Medications:     calcium citrate-vitamin D3 (Citracal+D) 315 mg-5 mcg (200 unit) tablet, Take 2 tablets by mouth once daily., Disp: 180 tablet, Rfl: 3    DEKAs Plus, folic acid, 200 mcg-1,000 mcg-10 mg capsule, Take 2 capsules by mouth once daily., Disp: , Rfl:     elexacaftor-tezacaftor-ivacaft (Trikafta) 100-50-75 mg tablet, TAKE TWO (2) ORANGE TABLETS BY MOUTH IN THE MORNING AND ONE (1) BLUE TABLET BY MOUTH IN THE EVENING. TAKE 12 HOURS APART WITH FATTY FOODS., Disp: 84 each, Rfl: 11     "lipase-protease-amylase (Zenpep) 20,000-63,000- 84,000 unit capsule, Take 6 capsules by mouth 3 times a day with meals. May also take 3 capsules with snacks for snacks., Disp: 540 capsule, Rfl: 11    omeprazole (PriLOSEC) 40 mg DR capsule, Take 1 capsule (40 mg) by mouth once daily in the morning. Take before meals., Disp: 90 capsule, Rfl: 3    OneTouch Delica Plus Lancet 33 gauge misc, USE TO CHECK BLOOD SUGAR 3 TIMES DAILY, Disp: , Rfl:     Past Medical History:  Past Medical History:   Diagnosis Date    Influenza due to other identified influenza virus with other respiratory manifestations 11/08/2016    Influenza A    Other specified respiratory disorders 10/18/2021    Pseudomonas respiratory infection    Tubular adenoma of colon 06/11/2024       Past Surgical History:  Past Surgical History:   Procedure Laterality Date    GASTROSTOMY TUBE PLACEMENT  08/15/2018    Percutaneous Placement Of Gastrostomy Tube       Family History:  Family History   Problem Relation Name Age of Onset    Obesity Father's Brother      Hypertension Paternal Grandmother      Heart defect Cousin      Cystic fibrosis Cousin      Cystic fibrosis Cousin      Esophageal cancer Neg Hx      Stomach cancer Neg Hx      Colon cancer Neg Hx      Hyperlipidemia Neg Hx          Vitals      6/3/2024    11:46 AM 6/3/2024    11:53 AM 6/3/2024    12:06 PM 6/3/2024    12:23 PM 6/10/2024     9:25 AM 6/28/2024    10:43 AM 7/8/2024    10:49 AM   Vitals   Systolic 113 114 116 119 125  112   Diastolic 74 87 68 83 71  70   Heart Rate 40 43 46 48 79  64   Temp     37.1 °C (98.7 °F)  36.6 °C (97.8 °F)   Resp 13 18 16 18 17  16   Height (in)     1.753 m (5' 9.02\")  1.753 m (5' 9.02\")   Weight (lb)     151.57 152 147.6   BMI     22.37 kg/m2 22.44 kg/m2 21.79 kg/m2   BSA (m2)     1.83 m2 1.83 m2 1.81 m2       Physical Exam:  NAD  No scleral icterus  Oropharynx clear  Lungs CTA bilaterally  RRR S1 S2  Abd soft NT ND; Gastrostomy site - erythematous, no obvious " leaking but the tract is still several mm wide  No edema    Labs:    WBC   Date/Time Value Ref Range Status   03/11/2024 09:51 AM 5.1 4.4 - 11.3 x10*3/uL Final     Hemoglobin   Date/Time Value Ref Range Status   03/11/2024 09:51 AM 14.5 13.5 - 17.5 g/dL Final     Hematocrit   Date/Time Value Ref Range Status   03/11/2024 09:51 AM 45.3 41.0 - 52.0 % Final     MCV   Date/Time Value Ref Range Status   03/11/2024 09:51 AM 97 80 - 100 fL Final     Platelets   Date/Time Value Ref Range Status   03/11/2024 09:51  150 - 450 x10*3/uL Final        Sodium   Date/Time Value Ref Range Status   03/11/2024 09:51  136 - 145 mmol/L Final     Potassium   Date/Time Value Ref Range Status   03/11/2024 09:51 AM 4.4 3.5 - 5.3 mmol/L Final     Chloride   Date/Time Value Ref Range Status   03/11/2024 09:51  98 - 107 mmol/L Final     Urea Nitrogen   Date/Time Value Ref Range Status   03/11/2024 09:51 AM 17 6 - 23 mg/dL Final     Creatinine   Date/Time Value Ref Range Status   03/11/2024 09:51 AM 0.81 0.50 - 1.30 mg/dL Final     Glucose   Date/Time Value Ref Range Status   03/11/2024 09:51  (H) 74 - 99 mg/dL Final       AST   Date/Time Value Ref Range Status   03/11/2024 09:51 AM 28 9 - 39 U/L Final     ALT   Date/Time Value Ref Range Status   03/11/2024 09:51 AM 43 10 - 52 U/L Final     Comment:     Patients treated with Sulfasalazine may generate falsely decreased results for ALT.     Alkaline Phosphatase   Date/Time Value Ref Range Status   03/11/2024 09:51  33 - 120 U/L Final     Bilirubin, Total   Date/Time Value Ref Range Status   03/11/2024 09:51 AM 0.8 0.0 - 1.2 mg/dL Final     Bilirubin, Direct   Date/Time Value Ref Range Status   03/11/2024 09:51 AM 0.1 0.0 - 0.3 mg/dL Final     GGT   Date/Time Value Ref Range Status   03/11/2024 09:51 AM 16 5 - 64 U/L Final     Albumin   Date/Time Value Ref Range Status   03/11/2024 09:51 AM 4.3 3.4 - 5.0 g/dL Final     Total Protein   Date/Time Value Ref Range Status  "  03/11/2024 09:51 AM 6.4 6.4 - 8.2 g/dL Final        Protime   Date/Time Value Ref Range Status   09/12/2022 02:40 PM 11.3 9.8 - 13.4 sec Final     INR   Date/Time Value Ref Range Status   09/12/2022 02:40 PM 1.0 0.9 - 1.1 Final        Vitamin D, 25-Hydroxy, Total   Date/Time Value Ref Range Status   03/11/2024 09:51 AM 46 30 - 100 ng/mL Final     Vitamin A (Retinol)   Date/Time Value Ref Range Status   03/11/2024 09:51 AM 0.48 0.30 - 1.20 mg/L Final     Vitamin A, Interpretation   Date/Time Value Ref Range Status   03/11/2024 09:51 AM Normal  Final     Comment:       This test was developed and its performance characteristics   determined by [facility]. It has not been cleared or   approved by the [place] Food and Drug Administration. This test was   performed in a CLIA certified laboratory and is intended for   clinical purposes.  Performed By: [facility]  [address] [place] [address]  : Henrry Chapa MD, PhD  CLIA Number: [id number]     Vitamin E (Alpha-Tocopherol)   Date/Time Value Ref Range Status   03/11/2024 09:51 AM 10.5 5.5 - 18.0 mg/L Final     Comment:       This test was developed and its performance characteristics   determined by [facility]. It has not been cleared or   approved by the US Food and Drug Administration. This test was   performed in a CLIA certified laboratory and is intended for   clinical purposes.     Vitamin E (Gamma-Tocopherol)   Date/Time Value Ref Range Status   03/11/2024 09:51 AM 1.2 0.0 - 6.0 mg/L Final     Comment:     Performed By: [facility]  [address] [place], [place] [id number]  : Henrry Chapa MD, PhD  CLIA Number: [id number]        No results found for: \"PANCRELASTFE\"    Ferritin   Date/Time Value Ref Range Status   09/12/2022 02:40 PM 63 20 - 300 ug/L Final     Iron   Date/Time Value Ref Range Status   09/12/2022 02:40  35 - 150 ug/dL Final     TIBC   Date/Time Value Ref " Range Status   2022 02:40  240 - 445 ug/dL Final     Iron Saturation   Date/Time Value Ref Range Status   2022 02:40 PM 28 25 - 45 % Final       Imagin11 Gastric emptying study     FINDINGS: The image series shows immediate accumulation of activity   within the stomach fundus with subsequent activity seen in the antrum   and subsequently small bowel. Computer quantification demonstrates   22% retention at one hour (normal < 90% and > 30%), 11% retention at   2 hours (normal < 60%), 5% retention at 3 hours (normal < 30%), and   3% gastric retention at 4 hours (normal < 10% ).     IMPRESSION:   Rapid gastric emptying times with minimal activity remaining in the   gastric antrum at 4 hours.    21 Abdominal ultrasound with Dopplers  FINDINGS:  LIVER:  The liver is normal in size, 14.8 cm in maximum dimension. The  hepatic parenchyma is homogeneous and demonstrates an expected  echotexture. No focal hepatic lesions are identified.     GALLBLADDER:  The gallbladder is nondistended, and demonstrates no evidence of  gallstones, wall thickening or surrounding fluid. Sonographic  Cui's sign is reportedly negative.     BILIARY SYSTEM:  No evidence of intra or extrahepatic biliary dilatation is  identified; the common bile duct measures 2 mm.     DOPPLER EVALUATION:     HEPATIC ARTERIES:  Hepatic artery and its right and left branches RI's are estimated at  0.71, 0.72 and 0.66, respectively.     PORTAL VEIN:  Portal vein is patent and measures 9 mm. There is normal respiratory  variation. Portal vein velocities are calculated as follows: main  portal vein 30.5 cm/sec, antegrade flow; left portal vein branch 14.9  cm/sec, antegrade flow; right portal vein anterior branch 17.9  cm/sec, antegrade flow; right portal vein posterior branch 24.3  cm/sec, antegrade flow. The splenic vein is also patent.     HEPATIC VEIN:  The right, middle and left hepatic veins are patent and demonstrate  triphasic  antegrade flow. IVC appears also patent.     PANCREAS:  The visualized pancreas is unremarkable in appearance.     RIGHT KIDNEY:  The right kidney measures 11.1 cm in length. No hydronephrosis or  renal calculi are seen.     SPLEEN:  The spleen measures 9.8 cm in length and is grossly unremarkable.     PERITONEUM AND RETROPERITONEUM:  There is no free or loculated fluid seen in the abdomen.     IMPRESSION:  Unremarkable ultrasound of the liver including Doppler the evaluation.    Previous Endoscopic Evaluation:  7/12/18 Colonoscopy - [facility] 9; internal hemorrhoids      6/3/24 Colonoscopy - [facility] 6; normal TI; aphthous ulcer in distal rectum; moderate semi-liquid stool visualized (lavaged with incomplete clearance and fair visualization); 3 mm DC polyp (tubular adenoma); small internal hemorrhoids    Assessment and Plan:  Patient is a 43 y.o. year old male with cystic fibrosis (genotype [id number]/1898+1G->A)    on elexacaftor/tezacaftor/ivacaftor, h/o exocrine pancreatic insufficiency, h/o adenomatous colon polyp, h/o gastrostomy tube placement s/p removal at home 6/21/24 here for followup.     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 Jasbir Dunlap.  Patient was provided scheduling number for Dr. Dunlap' office.  History of adenomatous colon polyp - Patient had an adenomatous polyp on colonoscopy June 2024.  Due for surveillance June 2027.  H/o abnormal LFTs - Ultrasound was reordered on 6/3/24; patient was provided scheduling number.    Follow up in 6 months (should get ultrasound in interim).    Aleena Johnson MD, MS  Gastroenterologist

## 2024-07-08 NOTE — PATIENT INSTRUCTIONS
"    It was very nice to see you today. We can offer you in-person and \"virtual\" appointments with your cystic fibrosis provider.    If you need to cancel or schedule an appointment, please call the  at the [facility] at [phone] between the hours of 8 AM and 5 PM, Monday-Friday.    To reach the CF nurse, Abby, please call [phone]. Abby has a secure voice mail account if you want to leave a message.    If you need to speak with an adult cystic fibrosis provider between the hours of 5 PM and 8 AM (overnight) or anytime on Saturday or Sunday, please call [phone]. When you call this number, you will be connected to an  with the [facility] and [facility] answering service. You should tell the  that you're an adult with cystic fibrosis who needs to speak to the \"cystic fibrosis doctor on-call.\" The  will take your contact information. You should then expect a call back from the cystic fibrosis doctor on-call within a short period of time.      Important Information:  Your CFTR variants (mutations) are: F508 del / 1898+1G->A    Your percent-predicted FEV1 today was: 91%    Your weight adjusted for height (body mass index, BMI) today was: 21.8  (goal for adult males with CF = 23.0 kg/m2, goal for adult females with CF = 22.0 kg/m2)    Sick plan (please call our office if you think you need to take antibiotics or be admitted to the hospital):   Ciprofloxacin 750 mg by mouth twice daily for 14 days.     I think the recent drop in weight is due to avoiding full stomach to leakage from PEG tube site.  We will check blood tests at next visit.  I will check with Dr. Schwarz about the need for the glucose tolerance test.  See Dr. Dunlap to get a stitch put in the PEG tube site to stop leakage.  Dr. Johnson recommends getting a baseline liver ultrasound.    Next appointment: 3 months; in-person  "

## 2024-07-08 NOTE — PROGRESS NOTES
Pt. Is here alone today.     Pt. Removed his G-Tube about 2 and a half weeks ago. He reports that the site leaks when he drinks Boost, but not with other food or beverages. He avoids eating and drinking at the same time. Pt. Feels that the stoma is healing well, but isn't sure if he should have it sewed together. RD agreed to pass this information along to GI Physician.    Pt reports that he would like to continue oral Boost Plus to maintain his weight. Pt.'s wt. Is currently at risk per CFF guidelines.     Pt. Had no further concerns at this time. RD provided contact info and encouraged pt. To reach out as nutritional concerns arise. Pt. Was agreeable.      Impression:  At risk BMI (21.8)  Pt. Had G-Tube removed    Recommendations:  Recommend continue PO Boost Plus BID  Recommend continue to monitor healing of G-Tube site  Recommend reach out to RD as nutritional concerns arise

## 2024-07-08 NOTE — PATIENT INSTRUCTIONS
Call [phone] to schedule your ultrasound.    I have placed a referral to Dr. 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  Call [phone] if you do not hear from Dr. Dunlap' office (surgeon).    Follow up in 6 months, after you have had the ultrasound.

## 2024-07-08 NOTE — PROGRESS NOTES
Tristin Sutherland M.D. 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 Pre-Trikafta ppFEV1 in mid 60% decile. Post Trikafta ppFEV1 in the mid 80% decile. Last hospitalization and in August 2018. Historically, difficulty maintaining adequate BMI, had feeding tube placed in his 20's, removed on 6/21/2024. Pre-Trikafta BMI 17-19 kg/m². Post Trikafta BMI stable at around 22.5 kg/m². Last Pseudomonas aeruginosa (November 2016). Last MRSA (March 2016). Occasional Aspergillus fumigatus.     CF complications: EPI on PERT; GERD controlled with omeprazole; suspicion for CF liver disease (Dr. Johnson, March 2023), wanted to get repeat RUQ ultrasound with duplex; osteopenia on DEXA in September 2022; (+) 2-hour OGTT on 3/27/2019 (baseline 97 mg/dL, 2-hour 219) but endocrinology considers him to have impaired glucose tolerance.    HPI (7/8/2024): PEG tube removed on 6/21/2024.  Has not been able to eat or drink to satiety due to leakage from around the PEG tube site.  Appetite intact, but weight down.  Denies steatorrhea.  Feeling good from lung perspective.      HPI (3/11/2024):  In-person visit today; no interval respiratory illnesses; still has some leaking around [place] button but much less than he had with the mini-profile; not doing tube feeding but taking two Boost supplements per day. Resumed taking azithromycin daily after (+) throat swab culture for Pseudomonas fulva in September 2023. Not coughing. Does not expectorate sputum. No wheezing. He is not exercising regularly, but attributes to time limitation. If he misses omeprazole, notices that gastric juice that leaks around PEG tube burns. Interested in research.     HPI (12/6/2023):  Not doing tube feeding.  Tube leaks, would like to get a new button.  Trying to maintain and gain weight using oral supplements but problems obtaining them. No interval need for  antibiotics.  Feels like his respiratory status is good. Not doing aerosols or chest physiotherapy.  Generally not coughing or expectorating sputum.  Denies any side effects associated with Trikafta.  Went to dermatology and had shave biopsy of pigmented lesion of lower back on left.    HPI (9/13/2023): First visit with Froy. Heretofore followed with Dr. Bravo, last visit on 6/12/2023. At that point, standard visit. No exacerbation. Doing tube feeding occasionally with Relizorb. Otherwise standard CF health measures. Since that time, he has done well without need for oral antibiotics. His girlfriend (Gauri) has a grandson in [place] with retinoblastoma, so they have been back and forth over past few months to support Gauri's son and daughter-in-law. Froy wonders whether he can/should have the VZV vaccine. He has also had a spot on skin of back bleed after scratching. Wants MD to take a look.     Current Use of Health Management Strategies:    Respiratory Interventions  Albuterol: Does not use  Pulmozyme: Does not use  Hypertonic saline: Does not use    HFCWO (percussive vest): Does not use  Oscillatory PEP: Does not use  Exercise: Other 2-3 times per week (isometrics)  LTE antagonist: No  Azithromycin: 250 mg daily  Aztreonam lysine ([place]): Does not use  Tobramycin: Does not use    Colistin: Does not use  Meropenem (inhaled): Does not use  Vancomycin (inhaled): Does not use  ICS: Does not use  ICS/LABA: Does not use  LAMA: No  CFTR modulator: Elexacaftor-Tezacaftor-Ivacaftor (Trikafta) Full-dose (2 orange tablets in AM, 1 blue tablet in PM)  High-dose ibuprofen: No   Oxygen: Does not use  Chronic oral antibiotics: Does not use     Digestive Health Interventions    Acid-suppressing medication?: Yes - omeprazole  Using CF vitamins?: Yes  Taking pancreatic enzymes?: Yes - Zenpep 20, 4- 5 capsules with meals, 2-3 with snacks  Any diarrhea?: No  Any steatorrhea?: No  Any constipation?: No  Using laxatives?:  "No  Feeding tube?: Yes  Supplemental enteral nutrition?: Yes - Ensure Plus, 2 per day (takes enzymes as if snack).     Pulmonary Function Test Results    PFT (7/8/2024): FEV1 3.64 L (91%), FVC 4.89 L (97%), FEV1/FVC 74; normal spirometry  PFT (3/11/2024): FEV1 3.42 L (85%), FVC 4.89 L (98%), FEV1/FVC 70; normal spirometry  PFT (12/6/2023): FEV1 3.53 L (88%), FVC 4.81 L (96%), FEV1/FVC 73; minimal airflow obstruction  PFT (9/13/2023): FEV1 3.38 L (84%), FVC 4.85 L (97%), FEV1/FVC 70; minimal airflow obstruction.  PFT (6/12/2023): FEV1 3.43 L (85%), FVC 4.79 L (96%), FEV1/FVC 72; normal spirometry  PFT (9/12/2022): FEV1 3.79 L (94%), FVC 4.92 L (98%), FEV1/FVC 77; normal spirometry  PFT (9/11/2018): FEV1 3.06 L (73%), FVC 4.59 L (88%), FEV1/FVC 67; mild airflow obstruction    Current Medications     Current Outpatient Medications   Medication Instructions    calcium citrate-vitamin D3 (Citracal+D) 315 mg-5 mcg (200 unit) tablet 2 tablets, oral, Daily    DEKAs Plus, folic acid, 200 mcg-1,000 mcg-10 mg capsule 2 capsules, oral, Daily    elexacaftor-tezacaftor-ivacaft (Trikafta) 100-50-75 mg tablet TAKE TWO (2) ORANGE TABLETS BY MOUTH IN THE MORNING AND ONE (1) BLUE TABLET BY MOUTH IN THE EVENING. TAKE 12 HOURS APART WITH FATTY FOODS.    lipase-protease-amylase (Zenpep) 20,000-63,000- 84,000 unit capsule Take 6 capsules by mouth 3 times a day with meals. May also take 3 capsules with snacks for snacks.    omeprazole (PRILOSEC) 40 mg, oral, Daily before breakfast    OneTouch Delica Plus Lancet 33 gauge misc USE TO CHECK BLOOD SUGAR 3 TIMES DAILY       Physical Examination     /70 (BP Location: Right arm, Patient Position: Sitting, BP Cuff Size: Adult)   Pulse 64   Temp 36.6 °C (97.8 °F) (Oral)   Resp 16   Ht 1.753 m (5' 9.02\")   Wt 67 kg (147 lb 9.6 oz)   SpO2 96%   BMI 21.79 kg/m²     General: Pleasant man in no distress. Thin but not cachectic. Unaccompanied. Normal vocal character.  HEENT: Good dentition. " Unremarkable nasal mucosa.  Neck: Supple, no lymphadenopathy or thyromegaly  Chest: Clear to auscultation bilaterally.   Cardiac: Regular rhythm, no murmur  Abdomen: Soft, non-tender, non-distended, fistulous tract at site of PEG tube (removed)  Extremities: (+) Mild digital clubbing, no cyanosis or edema  Skin: Multiple nevi.    Metabolic Parameters     Sodium   Date/Time Value Ref Range Status   03/11/2024 09:51  136 - 145 mmol/L Final     Potassium   Date/Time Value Ref Range Status   03/11/2024 09:51 AM 4.4 3.5 - 5.3 mmol/L Final     Chloride   Date/Time Value Ref Range Status   03/11/2024 09:51  98 - 107 mmol/L Final     Bicarbonate   Date/Time Value Ref Range Status   03/11/2024 09:51 AM 28 21 - 32 mmol/L Final     Anion Gap   Date/Time Value Ref Range Status   03/11/2024 09:51 AM 14 10 - 20 mmol/L Final     Urea Nitrogen   Date/Time Value Ref Range Status   03/11/2024 09:51 AM 17 6 - 23 mg/dL Final     Creatinine   Date/Time Value Ref Range Status   03/11/2024 09:51 AM 0.81 0.50 - 1.30 mg/dL Final     GFR MALE   Date/Time Value Ref Range Status   03/13/2023 12:35 PM >90 >90 mL/min/1.73m2 Final     Comment:      CALCULATIONS OF ESTIMATED GFR ARE PERFORMED   USING THE 2021 CKD-EPI STUDY REFIT EQUATION   WITHOUT THE RACE VARIABLE FOR THE IDMS-TRACEABLE   CREATININE METHODS.    https://jasn.asnjournals.org/content/early/2021/09/22/ASN.[email or web address]       Glucose   Date/Time Value Ref Range Status   03/11/2024 09:51  (H) 74 - 99 mg/dL Final     Calcium   Date/Time Value Ref Range Status   03/11/2024 09:51 AM 9.5 8.6 - 10.6 mg/dL Final     Phosphorus   Date/Time Value Ref Range Status   03/11/2024 09:51 AM 3.3 2.5 - 4.9 mg/dL Final     Comment:     The performance characteristics of phosphorus testing in heparinized plasma have been validated by the individual  laboratory site where testing is performed. Testing on heparinized plasma is not approved by the FDA; however, such approval is not  necessary.     Cholesterol   Date/Time Value Ref Range Status   03/11/2024 09:51  0 - 199 mg/dL Final     Comment:           Age      Desirable   Borderline High   High     0-19 Y     0 - 169       170 - 199     >/= 200    20-24 Y     0 - 189       190 - 224     >/= 225         >24 Y     0 - 199       200 - 239     >/= 240   **All ranges are based on fasting samples. Specific   therapeutic targets will vary based on patient-specific   cardiac risk.    Pediatric guidelines reference:Pediatrics 2011, 128(S5).Adult guidelines reference: NCEP ATPIII Guidelines,EDDIE 2001, 258:2486-97    Venipuncture immediately after or during the administration of Metamizole may lead to falsely low results. Testing should be performed immediately prior to Metamizole dosing.   03/13/2023 12:35  0 - 199 mg/dL Final     Comment:     .      AGE      DESIRABLE   BORDERLINE HIGH   HIGH     0-19 Y     0 - 169       170 - 199     >/= 200    20-24 Y     0 - 189       190 - 224     >/= 225         >24 Y     0 - 199       200 - 239     >/= 240   **All ranges are based on fasting samples. Specific   therapeutic targets will vary based on patient-specific   cardiac risk.  .   Pediatric guidelines reference:Pediatrics 2011, 128(S5).   Adult guidelines reference: NCEP ATPIII Guidelines,     EDDIE 2001, 258:2486-97  .   Venipuncture immediately after or during the    administration of Metamizole may lead to falsely   low results. Testing should be performed immediately   prior to Metamizole dosing.     09/12/2022 02:40  0 - 199 mg/dL Final     Comment:     .      AGE      DESIRABLE   BORDERLINE HIGH   HIGH     0-19 Y     0 - 169       170 - 199     >/= 200    20-24 Y     0 - 189       190 - 224     >/= 225         >24 Y     0 - 199       200 - 239     >/= 240   **All ranges are based on fasting samples. Specific   therapeutic targets will vary based on patient-specific   cardiac risk.  .   Pediatric guidelines reference:Pediatrics  2011, 128(S5).   Adult guidelines reference: NCEP ATPIII Guidelines,     EDDIE 2001, 258:2486-97  .   Venipuncture immediately after or during the    administration of Metamizole may lead to falsely   low results. Testing should be performed immediately   prior to Metamizole dosing.       HDL-Cholesterol   Date/Time Value Ref Range Status   03/11/2024 09:51 AM 57.7 mg/dL Final     Comment:       Age       Very Low   Low     Normal    High    0-19 Y    < 35      < 40     40-45     ----  20-24 Y    ----     < 40      >45      ----        >24 Y      ----     < 40     40-60      >60       HDL   Date/Time Value Ref Range Status   03/13/2023 12:35 PM 66.4 mg/dL Final     Comment:     .      AGE      VERY LOW   LOW     NORMAL    HIGH       0-19 Y       < 35   < 40     40-45     ----    20-24 Y       ----   < 40       >45     ----      >24 Y       ----   < 40     40-60      >60  .     09/12/2022 02:40 PM 50.2 mg/dL Final     Comment:     .      AGE      VERY LOW   LOW     NORMAL    HIGH       0-19 Y       < 35   < 40     40-45     ----    20-24 Y       ----   < 40       >45     ----      >24 Y       ----   < 40     40-60      >60  .       Triglycerides   Date/Time Value Ref Range Status   03/11/2024 09:51  0 - 149 mg/dL Final     Comment:        Age         Desirable   Borderline High   High     Very High   0 D-90 D    19 - 174         ----         ----        ----  91 D- 9 Y     0 -  74        75 -  99     >/= 100      ----    10-19 Y     0 -  89        90 - 129     >/= 130      ----    20-24 Y     0 - 114       115 - 149     >/= 150      ----         >24 Y     0 - 149       150 - 199    200- 499    >/= 500    Venipuncture immediately after or during the administration of Metamizole may lead to falsely low results. Testing should be performed immediately prior to Metamizole dosing.   03/13/2023 12:35 PM 93 0 - 149 mg/dL Final     Comment:     .      AGE      DESIRABLE   BORDERLINE HIGH   HIGH     VERY HIGH   0 D-90 D     19 - 174         ----         ----        ----  91 D- 9 Y     0 -  74        75 -  99     >/= 100      ----    10-19 Y     0 -  89        90 - 129     >/= 130      ----    20-24 Y     0 - 114       115 - 149     >/= 150      ----         >24 Y     0 - 149       150 - 199    200- 499    >/= 500  .   Venipuncture immediately after or during the    administration of Metamizole may lead to falsely   low results. Testing should be performed immediately   prior to Metamizole dosing.     09/12/2022 02:40  0 - 149 mg/dL Final     Comment:     .      AGE      DESIRABLE   BORDERLINE HIGH   HIGH     VERY HIGH   0 D-90 D    19 - 174         ----         ----        ----  91 D- 9 Y     0 -  74        75 -  99     >/= 100      ----    10-19 Y     0 -  89        90 - 129     >/= 130      ----    20-24 Y     0 - 114       115 - 149     >/= 150      ----         >24 Y     0 - 149       150 - 199    200- 499    >/= 500  .   Venipuncture immediately after or during the    administration of Metamizole may lead to falsely   low results. Testing should be performed immediately   prior to Metamizole dosing.         Hematologic Parameters     WBC   Date/Time Value Ref Range Status   03/11/2024 09:51 AM 5.1 4.4 - 11.3 x10*3/uL Final     Neutrophils Absolute   Date/Time Value Ref Range Status   03/11/2024 09:51 AM 2.65 1.20 - 7.70 x10*3/uL Final     Comment:     Percent differential counts (%) should be interpreted in the context of the absolute cell counts (cells/uL).     Neutrophils %   Date/Time Value Ref Range Status   03/11/2024 09:51 AM 52.0 40.0 - 80.0 % Final     Lymphocytes %   Date/Time Value Ref Range Status   03/11/2024 09:51 AM 34.8 13.0 - 44.0 % Final     Monocytes %   Date/Time Value Ref Range Status   03/11/2024 09:51 AM 6.3 2.0 - 10.0 % Final     Basophils %   Date/Time Value Ref Range Status   03/11/2024 09:51 AM 1.4 0.0 - 2.0 % Final     Eosinophils Absolute   Date/Time Value Ref Range Status   03/11/2024 09:51 AM  0.27 0.00 - 0.70 x10*3/uL Final     Eosinophils %   Date/Time Value Ref Range Status   03/11/2024 09:51 AM 5.3 0.0 - 6.0 % Final     Hemoglobin   Date/Time Value Ref Range Status   03/11/2024 09:51 AM 14.5 13.5 - 17.5 g/dL Final     Hematocrit   Date/Time Value Ref Range Status   03/11/2024 09:51 AM 45.3 41.0 - 52.0 % Final     RBC   Date/Time Value Ref Range Status   03/11/2024 09:51 AM 4.67 4.50 - 5.90 x10*6/uL Final     MCV   Date/Time Value Ref Range Status   03/11/2024 09:51 AM 97 80 - 100 fL Final     MCHC   Date/Time Value Ref Range Status   03/11/2024 09:51 AM 32.0 32.0 - 36.0 g/dL Final     Platelets   Date/Time Value Ref Range Status   03/11/2024 09:51  150 - 450 x10*3/uL Final       Fat-Soluble Vitamin Levels     Vitamin D, 25-Hydroxy, Total   Date/Time Value Ref Range Status   03/11/2024 09:51 AM 46 30 - 100 ng/mL Final     Vitamin A (Retinol)   Date/Time Value Ref Range Status   03/11/2024 09:51 AM 0.48 0.30 - 1.20 mg/L Final     Vitamin A, Interpretation   Date/Time Value Ref Range Status   03/11/2024 09:51 AM Normal  Final     Comment:       This test was developed and its performance characteristics   determined by [facility]. It has not been cleared or   approved by the US Food and Drug Administration. This test was   performed in a CLIA certified laboratory and is intended for   clinical purposes.  Performed By: [facility]  [address] [place] [id number]  : Henrry Chapa MD, PhD  CLIA Number: [id number]     Vitamin E (Alpha-Tocopherol)   Date/Time Value Ref Range Status   03/11/2024 09:51 AM 10.5 5.5 - 18.0 mg/L Final     Comment:       This test was developed and its performance characteristics   determined by [facility]. It has not been cleared or   approved by the US Food and Drug Administration. This test was   performed in a CLIA certified laboratory and is intended for   clinical purposes.     Vitamin E (Gamma-Tocopherol)    Date/Time Value Ref Range Status   03/11/2024 09:51 AM 1.2 0.0 - 6.0 mg/L Final     Comment:     Performed By: [facility]  [address] [place] [address]  : Henrry Chapa MD, PhD  CLIA Number: [id number]       LFT and Associated Parameters     AST   Date/Time Value Ref Range Status   03/11/2024 09:51 AM 28 9 - 39 U/L Final     ALT   Date/Time Value Ref Range Status   03/11/2024 09:51 AM 43 10 - 52 U/L Final     Comment:     Patients treated with Sulfasalazine may generate falsely decreased results for ALT.     Alkaline Phosphatase   Date/Time Value Ref Range Status   03/11/2024 09:51  33 - 120 U/L Final     Bilirubin, Total   Date/Time Value Ref Range Status   03/11/2024 09:51 AM 0.8 0.0 - 1.2 mg/dL Final     Bilirubin, Direct   Date/Time Value Ref Range Status   03/11/2024 09:51 AM 0.1 0.0 - 0.3 mg/dL Final     GGT   Date/Time Value Ref Range Status   03/11/2024 09:51 AM 16 5 - 64 U/L Final     Albumin   Date/Time Value Ref Range Status   03/11/2024 09:51 AM 4.3 3.4 - 5.0 g/dL Final     Total Protein   Date/Time Value Ref Range Status   03/11/2024 09:51 AM 6.4 6.4 - 8.2 g/dL Final       Coagulation Parameters     Protime   Date/Time Value Ref Range Status   09/12/2022 02:40 PM 11.3 9.8 - 13.4 sec Final     INR   Date/Time Value Ref Range Status   09/12/2022 02:40 PM 1.0 0.9 - 1.1 Final       Diabetes Laboratory Tests     Hemoglobin A1C   Date/Time Value Ref Range Status   03/11/2024 09:51 AM 5.5 see below % Final     Albumin/Creatine Ratio   Date/Time Value Ref Range Status   03/13/2023 12:35 PM SEE COMMENT 0.0 - 30.0 ug/mg crt Final     Comment:     One or more analytes used in this calculation   is outside of the analytical measurement range.  Calculation cannot be performed.       Glucose tolerance test Fasting   Date/Time Value Ref Range Status   03/27/2019 11:35 AM 97 <126 mg/dL Final     Glucose tolerance test Two Hour   Date/Time Value Ref Range Status    03/27/2019 11:35  (A) <200 mg/dL Final        Immunology Laboratory Tests     IgE   Date/Time Value Ref Range Status   03/11/2024 09:51 AM 6 0 - 214 IU/mL Final     Colonoscopy     Colonoscopy (7/3/2024)  Findings  The terminal ileum appeared normal.;  Aphthous, superficial ulcer in the distal rectum  A moderate amount of semi-liquid stool was visualized throughout the colon.  Lavage was performed with incomplete clearance and fair visualization.  One 3 mm sessile polyp in the descending colon; performed cold snare with complete en bloc removal and retrieved specimen  Internal small hemorrhoids observed during retroflexion; no bleeding was identified    A. COLON - DESCENDING POLYP, BIOPSY:            -Tubular adenoma.    DEXA Scan     XR BONE density 11/28/2022    Narrative  Name: ANGY QUINONEZ  Patient ID: [id number] YOB: 1980 Height: 69.0 in.  Gender:     Male     Exam Date:  11/28/2022 Weight: 147.0 lbs.  Indications: Cystic Fibrosis, malabsorption    Treatments:  Calcium (E943.0), FLUTICASONE, OMEPRAZOLE, Vitamin D  (E933.5)    LEFT FEMUR - TOTAL  The bone mineral density : 0.926 g/cm2  T-score : -1.2    % of young normal mean :84%  Z-score : -1.0    % of age matched mean : 87%  % change vs. Previous : -0.1%    % change vs. Baseline : -1.8%    LEFT FEMUR - NECK  The bone mineral density : 0.882 g/cm2  T-score : -1.4    % of young normal mean: 82%  Z-score : -1.1    % of age matched mean : 86%  % change vs. Previous : 0.1%    % change vs. Baseline : 0.6%    SPINE L1-L4  The bone mineral density is 0.934 g/cm2  T-score : -2.4   % of young normal mean is 77%  Z-score : -2.4    % of age matched mean is 77%  % change vs. Previous : -2.4%    % change vs. Baseline : 0.1%    World Health Organization (WHO) criteria for post-menopausal,   Women:  Normal:       T-score at or above -1 SD  Osteopenia:   T-score between -1 and -2.5 SD  Osteoporosis: T-score at or below -2.5 SD    10-Year  "Fracture Risk:  Major Osteoporotic Fracture N/A  Hip Fracture                N/A  Reported Risk Factors       None    Interpretation:  According to World Health Organization criteria, classification is low bone mass.  Followup recommended on November 2024 or sooner as clinically warranted.    Chest Radiograph     XR chest 2 view 11/28/2022    Narrative  MRN: [id number]  Patient Name: ANGY QUINONEZ    STUDY:  TH CHEST 2 VIEW PA AND LAT;    INDICATION:  CF  R63.6: Underweight.    COMPARISON:  09/26/2018.    ACCESSION NUMBER(S):  [id number]    ORDERING CLINICIAN:  GABY LOPEZ    FINDINGS:  The cardiac silhouette size is within normal limits.  There is no focal consolidation, edema or pneumothorax.  No sizeable pleural effusion.    Impression  No acute cardiopulmonary process.    CF Sputum Culture     No results found for: \"AFBCX\"     Respiratory Culture, Cystic Fibrosis   Date/Time Value Ref Range Status   07/08/2024 11:40 AM (2+) Few Normal throat scarlet  Final   07/08/2024 11:40 AM (A)  Final    (1+) Rare Methicillin Susceptible Staphylococcus aureus (MSSA)   03/11/2024 10:16 AM (2+) Few Normal throat scarlet  Final   12/06/2023 03:03 PM (1+) Rare Normal throat scarlet  Final   09/13/2023 12:05 PM 1+ NORMAL THROAT SCARLET. (A)  Final   09/13/2023 12:05 PM Pseudomonas (A)  Final     Comment:     2+~FURTHER IDENTIFIED AS PSEUDOMONAS FULVA       Susceptibility data from last 3000 days.  Collected Specimen Info Organism Amikacin Aztreonam Cefepime Ceftazidime Ciprofloxacin Clindamycin Erythromycin Meropenem Oxacillin Piperacillin/Tazobactam Tetracycline Tobramycin Trimethoprim/Sulfamethoxazole Vancomycin   07/08/24 Fluid from Throat Swab Methicillin Susceptible Staphylococcus aureus (MSSA)       S  R   S   S   S  S     Normal throat scarlet                 03/11/24 Fluid from Throat Swab Normal throat scarlet                 12/06/23 Fluid from Throat Swab Normal throat scarlet                 09/13/23 Respiratory Pseudomonas S I " S S S   S  I  S         Problem List Items Addressed This Visit       Cystic fibrosis (Multi)    Current Assessment & Plan     Excellent lung function today (ppFEV1 91%)  Plan for annual CF blood tests at next visit.  Continue Trikafta  Not doing aerosols or chest physiotherapy.  Surveillance throat swab culture  Sick plan: ciprofloxacin 750 mg by mouth twice daily for 14 days.         Relevant Orders    Testosterone,Free and Total    Respiratory Culture, Cystic Fibrosis (Completed)    Diabetes mellitus related to CF (cystic fibrosis) (Multi)    Current Assessment & Plan     I told Froy that I would ask Dr. Schwarz whether he needs to have the 2-hour oral glucose tolerance test before next visit because he was diagnosed with impaired glucose tolerance years ago (March 2019) before starting Trikafta.         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 Dunlap for closure of tract.         Tubular adenoma of colon    Current Assessment & Plan     Repeat colonoscopy with extended prep in 3 years (June 2027) per Dr. Johnson          Follow-up:  10/9/2024    Duke Chatman MD   07/08/2024

## 2024-07-08 NOTE — Clinical Note
Becky Mcduffie wanted me to ask you whether he should have 2-hour OGTT in October because he had OGTT in 2019 that technically qualified him as having CFRD.  You thought he had IGT.  Should he have it done again?  Thanks!

## 2024-07-12 LAB
BACTERIA SPT CF RESP CULT: ABNORMAL
BACTERIA SPT CF RESP CULT: ABNORMAL

## 2024-07-14 PROBLEM — Z23 NEED FOR ZOSTER VACCINATION: Status: RESOLVED | Noted: 2023-12-08 | Resolved: 2024-07-14

## 2024-07-14 PROBLEM — K31.6 GASTROCUTANEOUS FISTULA DUE TO GASTROSTOMY TUBE: Status: ACTIVE | Noted: 2024-07-14

## 2024-07-14 PROBLEM — E13.8: Status: RESOLVED | Noted: 2024-03-11 | Resolved: 2024-07-14

## 2024-07-14 PROBLEM — Z93.1 GASTROSTOMY STATUS (MULTI): Status: RESOLVED | Noted: 2023-12-05 | Resolved: 2024-07-14

## 2024-07-14 NOTE — ASSESSMENT & PLAN NOTE
DEXA scan (11/28/2022): Lumbar spine Z/T score -2.4 (osteopenia)  Influenza vaccination: 11/28/2023; has oseltamivir.  SARS CoV-2 vaccination: 3/26/2021, 2/23/2021  Shingrix: completed series.  Dr. Johnson recommends getting a baseline liver ultrasound.

## 2024-07-14 NOTE — ASSESSMENT & PLAN NOTE
Excellent lung function today (ppFEV1 91%)  Plan for annual CF blood tests at next visit.  Continue Trikafta  Not doing aerosols or chest physiotherapy.  Surveillance throat swab culture  Sick plan: ciprofloxacin 750 mg by mouth twice daily for 14 days.

## 2024-07-14 NOTE — ASSESSMENT & PLAN NOTE
I told Froy that I would ask Dr. Schwarz whether he needs to have the 2-hour oral glucose tolerance test before next visit because he was diagnosed with impaired glucose tolerance years ago (March 2019) before starting Trikafta.

## 2024-07-16 ENCOUNTER — SPECIALTY PHARMACY (OUTPATIENT)
Dept: PHARMACY | Facility: CLINIC | Age: 44
End: 2024-07-16

## 2024-07-16 PROCEDURE — RXMED WILLOW AMBULATORY MEDICATION CHARGE

## 2024-07-18 ENCOUNTER — PHARMACY VISIT (OUTPATIENT)
Dept: PHARMACY | Facility: CLINIC | Age: 44
End: 2024-07-18
Payer: COMMERCIAL

## 2024-07-29 ENCOUNTER — MULTIDISCIPLINARY MEETING (OUTPATIENT)
Dept: PEDIATRIC PULMONOLOGY | Facility: HOSPITAL | Age: 44
End: 2024-07-29
Payer: MEDICARE

## 2024-08-01 ENCOUNTER — APPOINTMENT (OUTPATIENT)
Dept: SURGERY | Facility: CLINIC | Age: 44
End: 2024-08-01
Payer: MEDICARE

## 2024-08-01 VITALS
BODY MASS INDEX: 21.4 KG/M2 | SYSTOLIC BLOOD PRESSURE: 105 MMHG | WEIGHT: 145 LBS | DIASTOLIC BLOOD PRESSURE: 67 MMHG | HEART RATE: 68 BPM

## 2024-08-01 DIAGNOSIS — K94.23 GASTROSTOMY SITE LEAK (MULTI): Primary | ICD-10-CM

## 2024-08-01 PROCEDURE — 3078F DIAST BP <80 MM HG: CPT | Performed by: SURGERY

## 2024-08-01 PROCEDURE — 3044F HG A1C LEVEL LT 7.0%: CPT | Performed by: SURGERY

## 2024-08-01 PROCEDURE — 3074F SYST BP LT 130 MM HG: CPT | Performed by: SURGERY

## 2024-08-01 PROCEDURE — 99213 OFFICE O/P EST LOW 20 MIN: CPT | Performed by: SURGERY

## 2024-08-01 PROCEDURE — 1036F TOBACCO NON-USER: CPT | Performed by: SURGERY

## 2024-08-01 PROCEDURE — 3048F LDL-C <100 MG/DL: CPT | Performed by: SURGERY

## 2024-08-01 NOTE — LETTER
August 1, 2024     Aleena Johnson MD  [address]  Department Of Medicine-Gastroenterology  [facility] [address]    Patient: Froy Parisi   YOB: 1980   Date of Visit: 8/1/2024       Dear Dr. Aleena Johnson MD:    Thank you for referring Froy Parisi to me for evaluation. Below are my notes for this consultation.  If you have questions, please do not hesitate to call me. I look forward to following your patient along with you.       Sincerely,     Jasbir Dunlap MD      CC: No Recipients  ______________________________________________________________________________________    History Of Present Illness  Froy Parisi is a 43 y.o. 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  I placed his gastrostomy tube in 2007.  With the [employer] of new drugs for CF he is doing extremely well.  I also did a laparoscopic lysis of adhesions back in 2007.  He pulled his gastrostomy tube after not using it for several months in June.  He is have daily leakage.  He is on no blood thinners.  He does take a PPI        Last Recorded Vitals  Blood pressure 105/67, pulse 68, weight 65.8 kg (145 lb).  Physical Examination  On his abdominal exam he has multiple scars.  Indentation from his gastrostomy tube.  There is some minor leakage on his bandage.  With his consent I used silver nitrate to cauterize the entire tract.  I then placed a pressure dressing on this.    Assessment/Plangastrocutaneous fistula.  If the fistula tract closes with silver nitrate nothing more needs to be done.  If it still leaking I will do a surgical excision of the tract.  Most like I will do an upper endoscopy at the same time.  He agrees with this.  Will set a surgical time in 2 weeks.  If it stops leaking he will just call me we will cancel the procedure    Jasbir Dunlap MD FACS  Professor of Surgery  Judah Shankar Chair in Surgical [place]  [facility]  [facility] [employer]  [address] [place] [address], [id number]  Phone [phone]  email: [email or web address]

## 2024-08-01 NOTE — H&P (VIEW-ONLY)
History Of Present Illness  Froy Parisi is a 43 y.o. 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  I placed his gastrostomy tube in 2007.  With the [employer] of new drugs for CF he is doing extremely well.  I also did a laparoscopic lysis of adhesions back in 2007.  He pulled his gastrostomy tube after not using it for several months in June.  He is have daily leakage.  He is on no blood thinners.  He does take a PPI        Last Recorded Vitals  Blood pressure 105/67, pulse 68, weight 65.8 kg (145 lb).  Physical Examination  On his abdominal exam he has multiple scars.  Indentation from his gastrostomy tube.  There is some minor leakage on his bandage.  With his consent I used silver nitrate to cauterize the entire tract.  I then placed a pressure dressing on this.    Assessment/Plan gastrocutaneous fistula.  If the fistula tract closes with silver nitrate nothing more needs to be done.  If it still leaking I will do a surgical excision of the tract.  Most like I will do an upper endoscopy at the same time.  He agrees with this.  Will set a surgical time in 2 weeks.  If it stops leaking he will just call me we will cancel the procedure    Jasbir Dunlap MD FACS  Professor of Surgery  Judah Shankar Chair in Surgical [place]  [facility] [employer]  [address] [place] [address], [phone]  Phone [phone]  email: [email or web address]

## 2024-08-01 NOTE — PROGRESS NOTES
History Of Present Illness  Froy Parisi is a 43 y.o. 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  I placed his gastrostomy tube in 2007.  With the [employer] of new drugs for CF he is doing extremely well.  I also did a laparoscopic lysis of adhesions back in 2007.  He pulled his gastrostomy tube after not using it for several months in June.  He is have daily leakage.  He is on no blood thinners.  He does take a PPI        Last Recorded Vitals  Blood pressure 105/67, pulse 68, weight 65.8 kg (145 lb).  Physical Examination  On his abdominal exam he has multiple scars.  Indentation from his gastrostomy tube.  There is some minor leakage on his bandage.  With his consent I used silver nitrate to cauterize the entire tract.  I then placed a pressure dressing on this.    Assessment/Plan gastrocutaneous fistula.  If the fistula tract closes with silver nitrate nothing more needs to be done.  If it still leaking I will do a surgical excision of the tract.  Most like I will do an upper endoscopy at the same time.  He agrees with this.  Will set a surgical time in 2 weeks.  If it stops leaking he will just call me we will cancel the procedure    Jasbir Dunlap MD FACS  Professor of Surgery  Judah Shankar Chair in Surgical [place]  [facility] [employer]  [address] [place] [address], [phone]  Phone [phone]  email: [email or web address]

## 2024-08-09 ENCOUNTER — TELEPHONE (OUTPATIENT)
Dept: PEDIATRIC PULMONOLOGY | Facility: HOSPITAL | Age: 44
End: 2024-08-09
Payer: MEDICARE

## 2024-08-09 NOTE — TELEPHONE ENCOUNTER
RN called Froy to follow up on whether he needs to complete OGTT from clinic appointment on 7/8/24. He verbalized that he has not been checking his blood sugars at home and agreed to do the OGTT at his next appointment which is on 10/9 at 11am.     This information was passed on to Dr. Schwarz, Dr. Chatman, and Evangelina PERSAUD

## 2024-08-13 ENCOUNTER — SPECIALTY PHARMACY (OUTPATIENT)
Dept: PHARMACY | Facility: CLINIC | Age: 44
End: 2024-08-13

## 2024-08-13 PROCEDURE — RXMED WILLOW AMBULATORY MEDICATION CHARGE

## 2024-08-14 ENCOUNTER — MULTIDISCIPLINARY VISIT (OUTPATIENT)
Dept: PEDIATRIC ENDOCRINOLOGY | Facility: HOSPITAL | Age: 44
End: 2024-08-14
Payer: MEDICARE

## 2024-08-14 VITALS
TEMPERATURE: 98.2 F | WEIGHT: 147.38 LBS | RESPIRATION RATE: 18 BRPM | OXYGEN SATURATION: 96 % | SYSTOLIC BLOOD PRESSURE: 97 MMHG | DIASTOLIC BLOOD PRESSURE: 63 MMHG | HEART RATE: 74 BPM | BODY MASS INDEX: 21.75 KG/M2

## 2024-08-14 DIAGNOSIS — E84.8 DIABETES MELLITUS RELATED TO CF (CYSTIC FIBROSIS) (MULTI): ICD-10-CM

## 2024-08-14 DIAGNOSIS — E08.9 DIABETES MELLITUS RELATED TO CF (CYSTIC FIBROSIS) (MULTI): ICD-10-CM

## 2024-08-14 DIAGNOSIS — R73.02 IMPAIRED GLUCOSE TOLERANCE: Primary | ICD-10-CM

## 2024-08-14 PROCEDURE — 99214 OFFICE O/P EST MOD 30 MIN: CPT | Performed by: PEDIATRICS

## 2024-08-14 PROCEDURE — 83036 HEMOGLOBIN GLYCOSYLATED A1C: CPT | Mod: QW | Performed by: PEDIATRICS

## 2024-08-14 NOTE — PROGRESS NOTES
Subjective    Froy Parisi is a 43 y.o. year old with a history of Cystic Fibrosis ( U125wmt/1898+1G->A ), exocrine pancreatic insufficiency, presenting for follow up of Cystic Fibrosis-Related diabetes (CFRD).   Has been undergoing screening for CFRD.   Had positive OGTT in 2019 and subsequently participated in a CFRD research study which indicated IGT.   Last A1c was  Lab Results   Component Value Date    HGBA1C 5.5 03/11/2024   Last endo visit 9/2023    HPI   INTERVAL HISTORY  Had G-tube removed but has remaining fistula--scheduled for repair on Monday, 8/19. Has had leakage through it.     Wanted to talk about CFRD screening. Has had positive OGTTs in the past. Most recent OGTT showed IGT.   Has not checked glucose in the last year.      Glucoses at last visit 9/2023:  4 tube feeds in 9/2023, checked 1 BG right after it ended (136 mg/dl)   In August 2023 checked 1 BG 2 hr after feed ended (114 mg/dl) and 2 hr after dinner (119 mg/dl)      CFRD Screening History:   Was initially seen in CFRD clinic in May 2019 for abnormal OGTT. Subsequently participated in a clinical study in which he wore a CGM and had a detailed OGTT.   OGTT in 3/2019: fasting 97, 2 hour 219  Extended OGTT in Oct 2019: fasting 89, peak 192 (90 min) 2 hr 136 (normal).   CGM showed glucose >140 mg/dl 15% of the time, mostly during tube feeds.    He is not on insulin currently; Was on levemir with overnight feeds many years ago.    Hyperglycemia:  Symptoms: no polys  Timing: n/a    Hypoglycemia:   Hypoglycemia frequency: none  Hypoglycemia awareness: Yes   Symptoms of Hypoglycemia:   Timing of Hypoglycemia:   Treats hypoglycemia with: Other:   Glucagon prescription:   n/a    Nutrition and Pulmonary review:  Diet Hx: normal diet--tries to avoid simple carbs. Adds protein and fat  Weight: stable; has been maintaining  Pulmonary status:    SCREENING FOR COMPLICATIONS:   Urine albumin:    Lab Results   Component Value Date    MICROALBCREA SEE  COMMENT 03/13/2023      Eye doctor:  n/a    Family History   Problem Relation Name Age of Onset    Obesity Father's Brother      Hypertension Paternal Grandmother      Heart defect Cousin      Cystic fibrosis Cousin      Cystic fibrosis Cousin      Esophageal cancer Neg Hx      Stomach cancer Neg Hx      Colon cancer Neg Hx      Hyperlipidemia Neg Hx        The patient does not have a known family history of diabetes.    Social History     Tobacco Use    Smoking status: Never    Smokeless tobacco: Never   Vaping Use    Vaping status: Every Day   Substance Use Topics    Alcohol use: Never    Drug use: Yes     Types: Marijuana     Comment: has medical marijuana- last used today        Objective   PHYSICAL EXAM:  BP 97/63 (BP Location: Right arm, Patient Position: Sitting, BP Cuff Size: Adult)   Pulse 74   Temp 36.8 °C (98.2 °F) (Oral)   Resp 18   Wt 66.8 kg (147 lb 6 oz)   SpO2 96%   BMI 21.75 kg/m²    General: Well nourished, no acute distress  HEENT: NCAT, MMM, eye movements grossly intact  Neck: Supple  Pulm: Non labored breathing  Skin: No visible rash  MSK: normal ROM  Ext: WWP  Neuro: CN grossly intact  Psych: alert, normal mood     LABS:  Hemoglobin A1C   Date/Time Value Ref Range Status   03/11/2024 09:51 AM 5.5 see below % Final   03/13/2023 12:35 PM 5.8 (A) % Final     Comment:          Diagnosis of Diabetes-Adults   Non-Diabetic: < or = 5.6%   Increased risk for developing diabetes: 5.7-6.4%   Diagnostic of diabetes: > or = 6.5%  .       Monitoring of Diabetes                Age (y)     Therapeutic Goal (%)   Adults:          >18           <7.0   Pediatrics:    13-18           <7.5                   7-12           <8.0                   0- 6            7.5-8.5   American Diabetes Association. Diabetes Care 33(S1), Jan 2010.     09/12/2022 02:40 PM 5.6 % Final     Comment:          Diagnosis of Diabetes-Adults   Non-Diabetic: < or = 5.6%   Increased risk for developing diabetes: 5.7-6.4%   Diagnostic  of diabetes: > or = 6.5%  .       Monitoring of Diabetes                Age (y)     Therapeutic Goal (%)   Adults:          >18           <7.0   Pediatrics:    13-18           <7.5                   7-12           <8.0                   0- 6            7.5-8.5   American Diabetes Association. Diabetes Care 33(S1), Jan 2010.     10/16/2019 01:05 PM 5.3 % Final     Comment:          Diagnosis of Diabetes-Adults   Non-Diabetic: < or = 5.6%   Increased risk for developing diabetes: 5.7-6.4%   Diagnostic of diabetes: > or = 6.5%  .       Monitoring of Diabetes                Age (y)     Therapeutic Goal (%)   Adults:          >18           <7.0   Pediatrics:    13-18           <7.5                   7-12           <8.0                   0- 6            7.5-8.5   American Diabetes Association. Diabetes Care 33(S1), Jan 2010.       Albumin/Creatine Ratio   Date/Time Value Ref Range Status   03/13/2023 12:35 PM SEE COMMENT 0.0 - 30.0 ug/mg crt Final     Comment:     One or more analytes used in this calculation   is outside of the analytical measurement range.  Calculation cannot be performed.       Glucose tolerance test Fasting   Date/Time Value Ref Range Status   03/27/2019 11:35 AM 97 <126 mg/dL Final     Glucose tolerance test Two Hour   Date/Time Value Ref Range Status   03/27/2019 11:35  (A) <200 mg/dL Final     Cholesterol   Date/Time Value Ref Range Status   03/11/2024 09:51  0 - 199 mg/dL Final     Comment:           Age      Desirable   Borderline High   High     0-19 Y     0 - 169       170 - 199     >/= 200    20-24 Y     0 - 189       190 - 224     >/= 225         >24 Y     0 - 199       200 - 239     >/= 240   **All ranges are based on fasting samples. Specific   therapeutic targets will vary based on patient-specific   cardiac risk.    Pediatric guidelines reference:Pediatrics 2011, 128(S5).Adult guidelines reference: NCEP ATPIII Guidelines,EDDIE 2001, 258:2486-97    Venipuncture immediately after  or during the administration of Metamizole may lead to falsely low results. Testing should be performed immediately prior to Metamizole dosing.     LDL   Date/Time Value Ref Range Status   03/13/2023 12:35 PM 93 0 - 99 mg/dL Final     Comment:     .                           NEAR      BORD      AGE      DESIRABLE  OPTIMAL    HIGH     HIGH     VERY HIGH     0-19 Y     0 - 109     ---    110-129   >/= 130     ----    20-24 Y     0 - 119     ---    120-159   >/= 160     ----      >24 Y     0 -  99   100-129  130-159   160-189     >/=190  .       HDL-Cholesterol   Date/Time Value Ref Range Status   03/11/2024 09:51 AM 57.7 mg/dL Final     Comment:       Age       Very Low   Low     Normal    High    0-19 Y    < 35      < 40     40-45     ----  20-24 Y    ----     < 40      >45      ----        >24 Y      ----     < 40     40-60      >60       Triglycerides   Date/Time Value Ref Range Status   03/11/2024 09:51  0 - 149 mg/dL Final     Comment:        Age         Desirable   Borderline High   High     Very High   0 D-90 D    19 - 174         ----         ----        ----  91 D- 9 Y     0 -  74        75 -  99     >/= 100      ----    10-19 Y     0 -  89        90 - 129     >/= 130      ----    20-24 Y     0 - 114       115 - 149     >/= 150      ----         >24 Y     0 - 149       150 - 199    200- 499    >/= 500    Venipuncture immediately after or during the administration of Metamizole may lead to falsely low results. Testing should be performed immediately prior to Metamizole dosing.     FEV1   Date/Time Value Ref Range Status   07/08/2024 11:16 AM 3.64 liters Final     Comment:     91%        ASSESSMENT/PLAN:   43 y.o.  male with CF, PI, g-tube--h/o insulin use with g-tube feeds. most recent OGTT in Oct 2019 WNL. On previous CGM in 2019, was generally in range with no glucoses >200. DId spend 15% of time >140, mainly overnight during tube feeds.   Now g-tube is out. A1c is stable at 5.8%.  Since we have no  consistent recent glucose data I have recommended some screening home glucose monitoring to see where we stand with regards to glycemic status.  His OGTTs have fluctuated--he has met criteria for CFRD but then subsequent OGTT was normal.      Recommend:  --could check fingerstick glucoses, 2 hour postprandial and some fasting  --other option is to wear a CGM to gather data; would recommend to wait until after surgery  --met with diabetes RN to review how to place CGM, download noemi, connect to clinic.   --Will place CGM after back to normal appetite post-op.  --will review data and plan follow up    Assessment/Plan   Problem List Items Addressed This Visit             ICD-10-CM    Impaired glucose tolerance - Primary R73.02    Diabetes mellitus related to CF (cystic fibrosis) (Multi) E84.8, E08.9       Follow up with CF Endocrinology--will plan follow up after CGM data is bcak  In-person (coordinate with Pulm appointment), In-person (CF endo appointment only), or Virtual    [facility] Endocrine Clinic:  1st and 3rd Tuesday Morning  2nd and 4th Wednesday Afternoon

## 2024-08-14 NOTE — PATIENT INSTRUCTIONS
Great to see you today Froy    We will mail you a freestyle Mira 3 sensor  Let us know when you have worn the Mira sensor for 14 days    Your A1c today is 5.8%    Pediatric Endocrinology  [phone]  [email or web address]  Leave a message for Nurse Lotus

## 2024-08-15 LAB — POC HEMOGLOBIN A1C: 5.8 % (ref 4.2–6.5)

## 2024-08-16 ENCOUNTER — PHARMACY VISIT (OUTPATIENT)
Dept: PHARMACY | Facility: CLINIC | Age: 44
End: 2024-08-16
Payer: COMMERCIAL

## 2024-08-16 ENCOUNTER — ANESTHESIA EVENT (OUTPATIENT)
Dept: OPERATING ROOM | Facility: HOSPITAL | Age: 44
End: 2024-08-16
Payer: MEDICARE

## 2024-08-19 ENCOUNTER — HOSPITAL ENCOUNTER (OUTPATIENT)
Facility: HOSPITAL | Age: 44
Setting detail: OUTPATIENT SURGERY
Discharge: HOME | End: 2024-08-19
Attending: SURGERY | Admitting: SURGERY
Payer: MEDICARE

## 2024-08-19 ENCOUNTER — ANESTHESIA (OUTPATIENT)
Dept: OPERATING ROOM | Facility: HOSPITAL | Age: 44
End: 2024-08-19
Payer: MEDICARE

## 2024-08-19 VITALS
TEMPERATURE: 97.5 F | HEIGHT: 69 IN | DIASTOLIC BLOOD PRESSURE: 64 MMHG | WEIGHT: 150 LBS | RESPIRATION RATE: 12 BRPM | SYSTOLIC BLOOD PRESSURE: 108 MMHG | BODY MASS INDEX: 22.22 KG/M2 | HEART RATE: 57 BPM | OXYGEN SATURATION: 98 %

## 2024-08-19 DIAGNOSIS — K94.23 GASTROSTOMY SITE LEAK (MULTI): Primary | ICD-10-CM

## 2024-08-19 PROCEDURE — 7100000010 HC PHASE TWO TIME - EACH INCREMENTAL 1 MINUTE: Performed by: SURGERY

## 2024-08-19 PROCEDURE — 7100000009 HC PHASE TWO TIME - INITIAL BASE CHARGE: Performed by: SURGERY

## 2024-08-19 PROCEDURE — 3700000002 HC GENERAL ANESTHESIA TIME - EACH INCREMENTAL 1 MINUTE: Performed by: SURGERY

## 2024-08-19 PROCEDURE — 43235 EGD DIAGNOSTIC BRUSH WASH: CPT | Performed by: SURGERY

## 2024-08-19 PROCEDURE — 2500000004 HC RX 250 GENERAL PHARMACY W/ HCPCS (ALT 636 FOR OP/ED): Mod: SE | Performed by: NURSE ANESTHETIST, CERTIFIED REGISTERED

## 2024-08-19 PROCEDURE — 2500000005 HC RX 250 GENERAL PHARMACY W/O HCPCS: Mod: SE | Performed by: NURSE ANESTHETIST, CERTIFIED REGISTERED

## 2024-08-19 PROCEDURE — 2720000007 HC OR 272 NO HCPCS: Performed by: SURGERY

## 2024-08-19 PROCEDURE — 3700000001 HC GENERAL ANESTHESIA TIME - INITIAL BASE CHARGE: Performed by: SURGERY

## 2024-08-19 PROCEDURE — 2500000001 HC RX 250 WO HCPCS SELF ADMINISTERED DRUGS (ALT 637 FOR MEDICARE OP): Mod: SE | Performed by: SURGERY

## 2024-08-19 PROCEDURE — 3600000008 HC OR TIME - EACH INCREMENTAL 1 MINUTE - PROCEDURE LEVEL THREE: Performed by: SURGERY

## 2024-08-19 PROCEDURE — 3600000003 HC OR TIME - INITIAL BASE CHARGE - PROCEDURE LEVEL THREE: Performed by: SURGERY

## 2024-08-19 PROCEDURE — 2500000005 HC RX 250 GENERAL PHARMACY W/O HCPCS: Mod: SE | Performed by: SURGERY

## 2024-08-19 PROCEDURE — 43870 CLOSURE GASTROSTOMY SURGICAL: CPT | Performed by: SURGERY

## 2024-08-19 RX ORDER — WATER 1 ML/ML
IRRIGANT IRRIGATION AS NEEDED
Status: DISCONTINUED | OUTPATIENT
Start: 2024-08-19 | End: 2024-08-19 | Stop reason: HOSPADM

## 2024-08-19 RX ORDER — OXYCODONE HYDROCHLORIDE 5 MG/1
5 TABLET ORAL EVERY 6 HOURS PRN
Qty: 10 TABLET | Refills: 0 | Status: SHIPPED | OUTPATIENT
Start: 2024-08-19

## 2024-08-19 RX ORDER — LIDOCAINE HYDROCHLORIDE 20 MG/ML
INJECTION, SOLUTION INFILTRATION; PERINEURAL AS NEEDED
Status: DISCONTINUED | OUTPATIENT
Start: 2024-08-19 | End: 2024-08-19

## 2024-08-19 RX ORDER — OXYCODONE HYDROCHLORIDE 5 MG/1
5 TABLET ORAL EVERY 4 HOURS PRN
Status: DISCONTINUED | OUTPATIENT
Start: 2024-08-19 | End: 2024-08-19 | Stop reason: HOSPADM

## 2024-08-19 RX ORDER — PHENYLEPHRINE HCL IN 0.9% NACL 0.4MG/10ML
SYRINGE (ML) INTRAVENOUS AS NEEDED
Status: DISCONTINUED | OUTPATIENT
Start: 2024-08-19 | End: 2024-08-19

## 2024-08-19 RX ORDER — BUPIVACAINE HCL/EPINEPHRINE 0.5-1:200K
VIAL (ML) INJECTION AS NEEDED
Status: DISCONTINUED | OUTPATIENT
Start: 2024-08-19 | End: 2024-08-19 | Stop reason: HOSPADM

## 2024-08-19 RX ORDER — ONDANSETRON HYDROCHLORIDE 2 MG/ML
4 INJECTION, SOLUTION INTRAVENOUS ONCE AS NEEDED
Status: DISCONTINUED | OUTPATIENT
Start: 2024-08-19 | End: 2024-08-19 | Stop reason: HOSPADM

## 2024-08-19 RX ORDER — HYDROMORPHONE HYDROCHLORIDE 1 MG/ML
0.4 INJECTION, SOLUTION INTRAMUSCULAR; INTRAVENOUS; SUBCUTANEOUS EVERY 5 MIN PRN
Status: DISCONTINUED | OUTPATIENT
Start: 2024-08-19 | End: 2024-08-19 | Stop reason: HOSPADM

## 2024-08-19 RX ORDER — CEFAZOLIN 1 G/1
INJECTION, POWDER, FOR SOLUTION INTRAVENOUS AS NEEDED
Status: DISCONTINUED | OUTPATIENT
Start: 2024-08-19 | End: 2024-08-19

## 2024-08-19 RX ORDER — HYDROMORPHONE HYDROCHLORIDE 1 MG/ML
0.2 INJECTION, SOLUTION INTRAMUSCULAR; INTRAVENOUS; SUBCUTANEOUS EVERY 5 MIN PRN
Status: DISCONTINUED | OUTPATIENT
Start: 2024-08-19 | End: 2024-08-19 | Stop reason: HOSPADM

## 2024-08-19 RX ORDER — PROPOFOL 10 MG/ML
INJECTION, EMULSION INTRAVENOUS CONTINUOUS PRN
Status: DISCONTINUED | OUTPATIENT
Start: 2024-08-19 | End: 2024-08-19

## 2024-08-19 RX ORDER — FENTANYL CITRATE 50 UG/ML
INJECTION, SOLUTION INTRAMUSCULAR; INTRAVENOUS AS NEEDED
Status: DISCONTINUED | OUTPATIENT
Start: 2024-08-19 | End: 2024-08-19

## 2024-08-19 RX ORDER — ALBUTEROL SULFATE 0.83 MG/ML
2.5 SOLUTION RESPIRATORY (INHALATION) ONCE AS NEEDED
Status: DISCONTINUED | OUTPATIENT
Start: 2024-08-19 | End: 2024-08-19 | Stop reason: HOSPADM

## 2024-08-19 RX ORDER — SODIUM CHLORIDE 0.9 G/100ML
IRRIGANT IRRIGATION AS NEEDED
Status: DISCONTINUED | OUTPATIENT
Start: 2024-08-19 | End: 2024-08-19 | Stop reason: HOSPADM

## 2024-08-19 RX ORDER — SODIUM CHLORIDE, SODIUM LACTATE, POTASSIUM CHLORIDE, CALCIUM CHLORIDE 600; 310; 30; 20 MG/100ML; MG/100ML; MG/100ML; MG/100ML
100 INJECTION, SOLUTION INTRAVENOUS CONTINUOUS
Status: DISCONTINUED | OUTPATIENT
Start: 2024-08-19 | End: 2024-08-19 | Stop reason: HOSPADM

## 2024-08-19 RX ORDER — ACETAMINOPHEN 325 MG/1
650 TABLET ORAL EVERY 4 HOURS PRN
Status: DISCONTINUED | OUTPATIENT
Start: 2024-08-19 | End: 2024-08-19 | Stop reason: HOSPADM

## 2024-08-19 RX ORDER — MIDAZOLAM HYDROCHLORIDE 1 MG/ML
INJECTION INTRAMUSCULAR; INTRAVENOUS AS NEEDED
Status: DISCONTINUED | OUTPATIENT
Start: 2024-08-19 | End: 2024-08-19

## 2024-08-19 SDOH — HEALTH STABILITY: MENTAL HEALTH: CURRENT SMOKER: 0

## 2024-08-19 ASSESSMENT — PAIN SCALES - GENERAL
PAINLEVEL_OUTOF10: 0 - NO PAIN
PAINLEVEL_OUTOF10: 0 - NO PAIN

## 2024-08-19 ASSESSMENT — COLUMBIA-SUICIDE SEVERITY RATING SCALE - C-SSRS
2. HAVE YOU ACTUALLY HAD ANY THOUGHTS OF KILLING YOURSELF?: NO
1. IN THE PAST MONTH, HAVE YOU WISHED YOU WERE DEAD OR WISHED YOU COULD GO TO SLEEP AND NOT WAKE UP?: NO
6. HAVE YOU EVER DONE ANYTHING, STARTED TO DO ANYTHING, OR PREPARED TO DO ANYTHING TO END YOUR LIFE?: NO

## 2024-08-19 ASSESSMENT — PAIN - FUNCTIONAL ASSESSMENT
PAIN_FUNCTIONAL_ASSESSMENT: 0-10
PAIN_FUNCTIONAL_ASSESSMENT: 0-10

## 2024-08-19 NOTE — OP NOTE
Closure Gastrostomy, Esophagogastroduodenoscopy Operative Note     Date: 2024  OR Location: [facility] OR    Name: Froy Parisi, : 1980, Age: 43 y.o., MRN: [id number], Sex: male    Diagnosis  Pre-op Diagnosis      * Gastrostomy site leak (Multi) [K94.23] Post-op Diagnosis     * Gastrostomy site leak (Multi) [K94.23]     Procedures  Closure Gastrostomy  65193 - ND CLOSURE GASTROSTOMY SURG    Esophagogastroduodenoscopy  68379 - ND ESOPHAGOGASTRODUODENOSCOPY TRANSORAL DIAGNOSTIC      Surgeons      * Jasbir Dunlap - Primary    Resident/Fellow/Other Assistant:  Surgeons and Role:     * Darius Kirk MD - Resident - Assisting    Procedure Summary  Anesthesia: General  ASA: III  Anesthesia Staff: Anesthesiologist: Ynes Mars MD  CRNA: DANICA Mabry-CRNA  Estimated Blood Loss: 10mL  Intra-op Medications:   Administrations occurring from 0815 to 1000 on 24:   Medication Name Total Dose   surgical lubricant gel 1 Application   sterile water irrigation solution 500 mL   sodium chloride 0.9 % irrigation solution 1,000 mL   BUPivacaine-EPINEPHrine (Marcaine w/EPI) 0.5 %-1:200,000 injection 30 mL              Anesthesia Record               Intraprocedure I/O Totals          Intake    LR bolus 250.00 mL    Propofol Drip 0.00 mL    The total shown is the total volume documented since Anesthesia Start was filed.    Total Intake 250 mL       Output    Est. Blood Loss 3 mL    Total Output 3 mL       Net    Net Volume 247 mL          Specimen:   ID Type Source Tests Collected by Time   1 : GASTRIC CUTANEOUS FISTULA Tissue FISTULA SURGICAL PATHOLOGY EXAM Jasbir Dunlap MD 2024 0848        Staff:   Circulator: Julianne Geiger Person: Brant         Drains and/or Catheters: * None in log *    Tourniquet Times:         Implants:     Findings: gastrocutaneous fistula, normal stomach on endoscopy    Indications: Froy Parisi is an 43 y.o. male who is having surgery for Gastrostomy site leak  (Multi) [K94.23].     The patient was seen in the preoperative area. The risks, benefits, complications, treatment options, non-operative alternatives, expected recovery and outcomes were discussed with the patient. The possibilities of reaction to medication, pulmonary aspiration, injury to surrounding structures, bleeding, recurrent infection, the need for additional procedures, failure to diagnose a condition, and creating a complication requiring transfusion or operation were discussed with the patient. The patient concurred with the proposed plan, giving informed consent.  The site of surgery was properly noted/marked if necessary per policy. The patient has been actively warmed in preoperative area. Preoperative antibiotics have been ordered and given within 1 hours of incision. Venous thrombosis prophylaxis have been ordered including bilateral sequential compression devices    Procedure Details: Patient brought to operating room.  Sedation was given.  Abdomen was prepped and draped.  Using a elliptical incision to excise the gastrocutaneous fistula.  used cautery to go down to the fascia of the rectus muscle.  Then slowly followed the fistula down to the posterior fascial layer of the rectus muscle.  This point in time I put 2 stay sutures.  I then resected the fistula.  I closed the small gastric fistula with 2-0 Vicryl in a running fashion.  I then inverted this.  I then passed my video scope down the soft into the stomach.  Patient did have a lax lower esophageal sphincter.  No other gastric lesions were seen.  We can see where we closed the fistula.  There is no air leak.  I removed my video endoscope.  Closed the posterior fascia of the rectus muscle with 2-0 Vicryl.  Closed anterior rectus fascia with 2-0 Vicryl.  Closed the skin in 2 layers with 3-0 Vicryl.  Skin glue was placed.  Complications:  None; patient tolerated the procedure well.    Disposition: PACU - hemodynamically stable.  Condition:  stable       Attending Attestation: I was present and scrubbed for the entire procedure.    Jasbir Dunlap  Phone Number: [phone]

## 2024-08-19 NOTE — ANESTHESIA POSTPROCEDURE EVALUATION
Patient: Froy Parisi    Procedure Summary       Date: 08/19/24 Room / Location: [facility] OR 01 / [facility]    Anesthesia Start: 0826 Anesthesia Stop: 0914    Procedures:       Closure Gastrostomy (Abdomen)      Esophagogastroduodenoscopy (Esophagus) Diagnosis:       Gastrostomy site leak (Multi)      (Gastrostomy site leak (Multi) [K94.23])    Surgeons: Jasbir Dunlap MD Responsible Provider: Ynes Mars MD    Anesthesia Type: general ASA Status: 3            Anesthesia Type: general    Vitals Value Taken Time   /64 08/19/24 0910   Temp 36.4 °C (97.5 °F) 08/19/24 0910   Pulse 57 08/19/24 0910   Resp 12 08/19/24 0910   SpO2 98 % 08/19/24 0910       Anesthesia Post Evaluation    Patient location during evaluation: PACU  Patient participation: complete - patient participated  Level of consciousness: awake and alert  Pain management: satisfactory to patient  Airway patency: patent  Cardiovascular status: acceptable  Respiratory status: acceptable  Hydration status: acceptable  Postoperative Nausea and Vomiting: none        No notable events documented.

## 2024-08-19 NOTE — ANESTHESIA PREPROCEDURE EVALUATION
Patient: Froy Parisi    Procedure Information       Date/Time: 08/19/24 0815    Procedures:       Closure Gastrostomy      Esophagogastroduodenoscopy (Esophagus)    Location: [facility] OR  / [facility] OR    Surgeons: Jasbir Dunlap MD            Relevant Problems   GI   (+) GERD (gastroesophageal reflux disease)      Hematology   (+) Bleeding diathesis due to vitamin K deficiency (Multi)      HEENT   (+) Sensorineural hearing loss (SNHL) of left ear with restricted hearing of right ear      ID   (+) Pseudomonas aeruginosa infection       Clinical information reviewed:   Tobacco  Allergies    Med Hx  Surg Hx   Fam Hx  Soc Hx        NPO Detail:  NPO/Void Status  Date of Last Liquid: 08/19/24  Time of Last Liquid: 0000  Date of Last Solid: 08/19/24  Time of Last Solid: 0000         Physical Exam    Airway  Mallampati: I  TM distance: >3 FB  Neck ROM: full     Cardiovascular   Rhythm: regular     Dental - normal exam     Pulmonary   Breath sounds clear to auscultation     Abdominal   Abdomen: soft  Bowel sounds: normal           Anesthesia Plan    History of general anesthesia?: yes  History of complications of general anesthesia?: no    ASA 3     general     The patient is not a current smoker.  Patient was not previously instructed to abstain from smoking on day of procedure.  Patient did not smoke on day of procedure.    intravenous induction   Anesthetic plan and risks discussed with patient.  Use of blood products discussed with patient who.    Plan discussed with CRNA.

## 2024-08-19 NOTE — INTERVAL H&P NOTE
H&P reviewed. The patient was examined and there are no changes to the H&P.  Fistula still present. Cite is marked

## 2024-08-19 NOTE — DISCHARGE INSTRUCTIONS
You have 1 surgical incision on your abdomen covered in surgical glue. The glue will fall off over time.     You may resume a regular diet after discharge.     Avoid bathing or submerging in pools for the next 2 weeks. You may shower after 24 hours.    Please call to schedule an appointment with Dr. Dunlap in 2 weeks.

## 2024-08-20 ASSESSMENT — PAIN SCALES - GENERAL: PAINLEVEL_OUTOF10: 2

## 2024-08-26 LAB
LABORATORY COMMENT REPORT: NORMAL
PATH REPORT.FINAL DX SPEC: NORMAL
PATH REPORT.GROSS SPEC: NORMAL
PATH REPORT.RELEVANT HX SPEC: NORMAL
PATH REPORT.TOTAL CANCER: NORMAL

## 2024-08-29 PROBLEM — J44.9 CHRONIC OBSTRUCTIVE PULMONARY DISEASE (MULTI): Status: ACTIVE | Noted: 2024-08-29

## 2024-08-29 PROBLEM — R07.81 PLEURODYNIA: Status: ACTIVE | Noted: 2017-08-30

## 2024-08-29 PROBLEM — J18.9 PNEUMONIA, UNSPECIFIED ORGANISM: Status: ACTIVE | Noted: 2017-09-05

## 2024-08-29 PROBLEM — R93.0 ABNORMAL MAGNETIC RESONANCE IMAGING OF HEAD: Status: ACTIVE | Noted: 2024-08-29

## 2024-08-29 PROBLEM — R93.89 ABNORMAL FINDING OF DIAGNOSTIC IMAGING: Status: ACTIVE | Noted: 2024-08-29

## 2024-08-29 PROBLEM — M85.9 LOW BONE DENSITY: Status: ACTIVE | Noted: 2024-08-29

## 2024-08-29 PROBLEM — K86.81 EXOCRINE PANCREATIC INSUFFICIENCY (HHS-HCC): Status: ACTIVE | Noted: 2024-08-29

## 2024-08-29 PROBLEM — R73.09 ABNORMAL GLUCOSE TOLERANCE TEST (GTT): Status: ACTIVE | Noted: 2018-04-09

## 2024-08-29 PROBLEM — E55.9 VITAMIN D INSUFFICIENCY: Status: ACTIVE | Noted: 2024-08-29

## 2024-08-29 PROBLEM — M25.559 ARTHRALGIA OF HIP: Status: ACTIVE | Noted: 2024-08-29

## 2024-08-29 PROBLEM — M25.552 PAIN IN LEFT HIP: Status: ACTIVE | Noted: 2022-12-06

## 2024-08-29 RX ORDER — BLOOD-GLUCOSE METER
EACH MISCELLANEOUS
COMMUNITY
Start: 2022-08-26

## 2024-09-03 ENCOUNTER — OFFICE VISIT (OUTPATIENT)
Dept: SURGERY | Facility: CLINIC | Age: 44
End: 2024-09-03
Payer: MEDICARE

## 2024-09-03 VITALS
HEART RATE: 62 BPM | TEMPERATURE: 98.1 F | BODY MASS INDEX: 21.41 KG/M2 | WEIGHT: 145 LBS | SYSTOLIC BLOOD PRESSURE: 103 MMHG | DIASTOLIC BLOOD PRESSURE: 67 MMHG

## 2024-09-03 DIAGNOSIS — K94.23 GASTROSTOMY SITE LEAK (MULTI): Primary | ICD-10-CM

## 2024-09-03 PROCEDURE — 99211 OFF/OP EST MAY X REQ PHY/QHP: CPT | Performed by: SURGERY

## 2024-09-03 ASSESSMENT — PAIN SCALES - GENERAL: PAINLEVEL: 0-NO PAIN

## 2024-09-03 NOTE — PROGRESS NOTES
Froy Parisi is a 43 y.o. male presenting status post excision of gastrocutaneous fistula.  He is doing well.  He is happy with results.  No problems eating.      Last Recorded Vitals  Blood pressure 103/67, pulse 62, temperature 36.7 °C (98.1 °F), weight 65.8 kg (145 lb).  Physical Exam wound well-healed.      Assessment/Plan   Resolution of his gastric cutaneous fistula.  No limitations to activities.  He will follow-up me as needed.    Jasbir Dunlap MD FACS  Professor of Surgery  Judah Shankar Chair in Surgical [place]  [facility] [employer] [facility]  [address] [place] [address], [address]  Phone [phone]  email: [email or web address]

## 2024-09-05 ENCOUNTER — OFFICE VISIT (OUTPATIENT)
Dept: URGENT CARE | Facility: CLINIC | Age: 44
End: 2024-09-05
Payer: MEDICARE

## 2024-09-05 ENCOUNTER — TELEPHONE (OUTPATIENT)
Dept: PEDIATRIC PULMONOLOGY | Facility: HOSPITAL | Age: 44
End: 2024-09-05
Payer: MEDICARE

## 2024-09-05 VITALS
HEIGHT: 69 IN | HEART RATE: 64 BPM | RESPIRATION RATE: 16 BRPM | SYSTOLIC BLOOD PRESSURE: 135 MMHG | WEIGHT: 150 LBS | OXYGEN SATURATION: 96 % | BODY MASS INDEX: 22.22 KG/M2 | TEMPERATURE: 97.4 F | DIASTOLIC BLOOD PRESSURE: 79 MMHG

## 2024-09-05 DIAGNOSIS — J01.90 ACUTE SINUSITIS, RECURRENCE NOT SPECIFIED, UNSPECIFIED LOCATION: Primary | ICD-10-CM

## 2024-09-05 PROCEDURE — 99213 OFFICE O/P EST LOW 20 MIN: CPT | Performed by: NURSE PRACTITIONER

## 2024-09-05 RX ORDER — AMOXICILLIN AND CLAVULANATE POTASSIUM 875; 125 MG/1; MG/1
1 TABLET, FILM COATED ORAL 2 TIMES DAILY
Qty: 20 TABLET | Refills: 0 | Status: SHIPPED | OUTPATIENT
Start: 2024-09-05 | End: 2024-09-15

## 2024-09-05 NOTE — PROGRESS NOTES
[facility]  Lenoresergio Aly, APRN-CNP     Visit Note - 9/5/2024 1:11 PM   This note was generated with voice recognition software and may contain errors including spelling, grammar, syntax, and misrecognization of what was dictated.    Patient: Froy Parisi, MRN: [id number], 43 y.o., male   PCP: No primary care provider on file.  ------------------------------------  ALLERGIES: No Known Allergies     CURRENT MEDICATIONS:   Current Outpatient Medications   Medication Instructions    amoxicillin-pot clavulanate (Augmentin) 875-125 mg tablet 1 tablet, oral, 2 times daily, Take with a meal.    blood sugar diagnostic (OneTouch Verio test strips) strip Test blood sugar fasting, 2 hours after your largest meal, and as needed    calcium citrate-vitamin D3 (Citracal+D) 315 mg-5 mcg (200 unit) tablet 2 tablets, oral, Daily    DEKAs Plus, folic acid, 200 mcg-1,000 mcg-10 mg capsule 2 capsules, oral, Daily    elexacaftor-tezacaftor-ivacaft (Trikafta) 100-50-75 mg tablet TAKE TWO (2) ORANGE TABLETS BY MOUTH IN THE MORNING AND ONE (1) BLUE TABLET BY MOUTH IN THE EVENING. TAKE 12 HOURS APART WITH FATTY FOODS.    lipase-protease-amylase (Zenpep) 20,000-63,000- 84,000 unit capsule Take 6 capsules by mouth 3 times a day with meals. May also take 3 capsules with snacks for snacks.    omeprazole (PRILOSEC) 40 mg, oral, Daily before breakfast    OneTouch Delica Plus Lancet 33 gauge misc USE TO CHECK BLOOD SUGAR 3 TIMES DAILY    oxyCODONE (ROXICODONE) 5 mg, oral, Every 6 hours PRN     ------------------------------------  PAST MEDICAL HX:  Patient Active Problem List   Diagnosis    Cystic fibrosis with pulmonary manifestations (Multi)    Exocrine pancreatic manifestation of cystic fibrosis (Multi)    GERD (gastroesophageal reflux disease)    Sensorineural hearing loss (SNHL) of left ear with restricted hearing of right ear    Bleeding diathesis due to vitamin K deficiency (Multi)    Healthcare maintenance     Osteopenia    Vitamin D deficiency    Impaired glucose tolerance    Pseudomonas aeruginosa infection    Melanocytic nevus of trunk    Difficulty maintaining weight    Diabetes mellitus related to CF (cystic fibrosis) (Multi)    Cystic fibrosis (Multi)    Tubular adenoma of colon    Gastrocutaneous fistula due to gastrostomy tube    Gastrostomy site leak (Multi)    Abnormal finding of diagnostic imaging    Abnormal glucose tolerance test (GTT)    Pneumonia, unspecified organism    Abnormal magnetic resonance imaging of head    Arthralgia of hip    Chronic obstructive pulmonary disease (Multi)    Exocrine pancreatic insufficiency (HHS-HCC)    Low bone density    Pain in left hip    Pleurodynia    Vitamin D insufficiency      SURGICAL HX:  Past Surgical History:   Procedure Laterality Date    GASTROSTOMY TUBE PLACEMENT  08/15/2018    Percutaneous Placement Of Gastrostomy Tube    Recently had gastrostomy revised.   FAMILY HX:   No pertinent history.   SOCIAL HX:    reports that he has never smoked. He has never used smokeless tobacco.  ------------------------------------  CHIEF COMPLAINT:   Chief Complaint   Patient presents with    URI     Sinus congestion, watery eyes , sore throat, sinus pressure, exhausted  X 6 days       HISTORY OF PRESENT ILLNESS: The history was obtained from patient. Froy is a 43 y.o. male, who presents with a chief complaint of sinus pressure and nasal congestion (yellow/clear mucus) x 8 days. Has also had a sore throat, watery eyes, fatigue, PND, bilat ears feel plugged, headaches, chills, and a slight, productive cough that he attributes to his PND. Denies any chest congestion, but does have a history of Cystic Fibrosis. Denies any fevers, body aches, rashes, abdominal pain, chest pain, wheezing/shortness of breath, urinary symptoms, nausea/vomiting, and diarrhea. No dizziness/lightheadedness. Appetite is decreased ; is able to eat and drink fluids without difficulty; denies loss of sense  "of taste or smell. Reports symptoms are unchanged since onset. Has been taking  Ibuprofen and Claritin D  without much relief; no other over-the-counter medications or home remedies for symptom management.  Has recently traveled to [place] and been around his grandkids who have had cold symptoms; no other known ill contacts Has received the COVID vaccine x 3; Has not received this season's influenza vaccine. Last known COVID infection was in 2022 . Is not a smoker. No recent antibiotic use.     REVIEW OF SYSTEMS:  10 systems reviewed negative with exception of history of present illness as listed above.    TODAY'S VITALS: /79   Pulse 64   Temp 36.3 °C (97.4 °F)   Resp 16   Ht 1.753 m (5' 9\")   Wt 68 kg (150 lb)   SpO2 96%   BMI 22.15 kg/m²     PHYSICAL EXAMINATION:  General: Mildly ill-appearing, well nourished male; alert and oriented, in no acute distress. + audible nasal congestion.  Eyes:  Pupils equal, round and reactive to light. No conjunctival erythema; no scleral icterus.   HENT: + frontal and maxillary sinus tenderness (R>L), with audible nasal congestion. Bilat ear canals clear/unremarkable, but TMs with cloudy effusions bilat; no erythema, and not bulging. Nasal mucosa moderately boggy and edematous (R>L). Airway patent, oral mucosa moist. Posterior pharynx mildly injected but without vesicles or oropharyngeal exudate aside from PND. Uvula is midline. Trachea is midline. Managing oral secretions without difficulty.  Neck:  Supple. Tender, mobile anterior cervical lymphadenopathy bilat.  Respiratory:  Lungs are clear to auscultation; no wheezes, rhonchi, or rales. Respirations unlabored, Breath sounds are equal, Symmetrical chest wall expansion. Loose, productive cough noted.   Cardiovascular:  Normal rate, Regular rhythm. Normal S1S2. No m/r/g. No peripheral edema.   Gastrointestinal:  Soft, non-tender, non-distended; scar from gastrostomy site noted. Bowel sounds " normoactive.  Musculoskeletal:  Grossly normal  Integumentary:  Pink, warm, dry, and intact. No rashes or skin discoloration appreciated.    Neurologic:  Alert and oriented, no focal deficits, no motor or sensory deficits.    Cognition and Speech:  Oriented, Speech clear and coherent.    Psychiatric:  Cooperative, Appropriate mood & affect.       ------------------------------------  Medical Decision Making  LABORATORY or RADIOLOGICAL IMAGING ORDERS/RESULTS:   None    IMPRESSION/PLAN:  Course: Worsening; stable    1. Acute sinusitis, recurrence not specified, unspecified location  - amoxicillin-pot clavulanate (Augmentin) 875-125 mg tablet; Take 1 tablet by mouth 2 times a day for 10 days. Take with a meal.  Dispense: 20 tablet; Refill: 0    No red flags on exam today; no sign of CF complication at this time. Symptoms consistent with sinusitis, although reviewed other potential etiologies. Due to severity/duration of sxs, will begin treatment for bacterial infection today (Augmentin). Should finish full course of antibiotics, even if symptoms resolve more quickly. Instructed to push fluids, rest, and to use appropriate over the counter medications as needed for management of symptoms - discussed that Mucinex, vaporizer, and Saline Nasal Spray may be helpful.  Reviewed red flags to monitor for, counseled on potential adverse reactions of treatments, expectations for improvement in sxs, and advised to follow-up with primary care provider in 3-5 days if symptoms persist, or sooner if worsening or if any additional concerns/red flags develop.  Patient verbalized understanding and agreed with plan of care; questions were encouraged and answered.       DANICA Gamboa-CNP   Advanced Practice Provider  [facility]

## 2024-09-05 NOTE — TELEPHONE ENCOUNTER
Froy called and when I returned call, I left a voice message. Froy then returned call and informed me that he went to an urgent care for a sinus infection. MD prescribed Augmentin. He stated he has had symptoms for about week.  I verbalized that I would pass this information onto Dr. Chatman and will let Froy know if we have any more suggestions.  Froy verbalized understanding.

## 2024-09-09 ENCOUNTER — HOSPITAL ENCOUNTER (OUTPATIENT)
Dept: RESEARCH | Facility: HOSPITAL | Age: 44
Discharge: HOME | End: 2024-09-09
Payer: MEDICARE

## 2024-09-09 ENCOUNTER — HOSPITAL ENCOUNTER (OUTPATIENT)
Dept: RESPIRATORY THERAPY | Facility: HOSPITAL | Age: 44
Discharge: HOME | End: 2024-09-09
Payer: MEDICARE

## 2024-09-09 DIAGNOSIS — Z00.6 RESEARCH SUBJECT: ICD-10-CM

## 2024-09-09 LAB
FEF 25-75: 2.46 L/S
FEV1/FVC: 69 %
FEV1: 3.4 LITERS
FVC: 4.94 LITERS
PEF: 6.68 L/S

## 2024-09-09 ASSESSMENT — PULMONARY FUNCTION TESTS
FEV1/FVC: 0.03
FVC (LITERS): 4.94
FEV1 (%PREDICTED): 85
FEV1 (LITERS): 3.4
FVC_PERCENT_PREDICTED: 99

## 2024-09-10 ENCOUNTER — SPECIALTY PHARMACY (OUTPATIENT)
Dept: PHARMACY | Facility: CLINIC | Age: 44
End: 2024-09-10

## 2024-09-10 PROCEDURE — RXMED WILLOW AMBULATORY MEDICATION CHARGE

## 2024-09-12 ENCOUNTER — PHARMACY VISIT (OUTPATIENT)
Dept: PHARMACY | Facility: CLINIC | Age: 44
End: 2024-09-12
Payer: COMMERCIAL

## 2024-09-16 DIAGNOSIS — Z00.6 RESEARCH SUBJECT: Primary | ICD-10-CM

## 2024-09-25 ENCOUNTER — TELEPHONE (OUTPATIENT)
Dept: PEDIATRIC ENDOCRINOLOGY | Facility: CLINIC | Age: 44
End: 2024-09-25
Payer: MEDICARE

## 2024-09-25 NOTE — TELEPHONE ENCOUNTER
Called Froy to discuss CGM download.        Froy has a Boost in the morning and then he noted his glucose level spikes.  Discussed adding a fat/protein to the Boost to help reduce the spike in the glucose level.  See email sent to Froy as a follow up below      Yes Froy, I can see the data.  I will discuss with Dr. Schwarz and give you a call. (She is at a conference this week)  Thank you for wearing the Mira.      I do see that you had a lower glucose level today around 930am.  This was after a high glucose level that looks like it went above 200. It appears that this also happened on Saturday morning where you had a low (56) after a spike in your glucose level around 9am.   What did you eat/drink for breakfast this morning?      People with CF, even those not taking insulin, may have low blood sugars an hour or so after eating meals.  This is due to your pancreas not secreting insulin at the right time or in the right amount.    Try to include foods that contain protein and fat at meals -especially meals that contain a large amount of simple carbohydrates.  (Foods like sweets or very processed carbohydrates).  Try to limit or avoid beverages that are high in sugar such as juice, pop, sweetened teas, lemonade etc.  Desserts and candy should be eaten with a meal to slow that rapid spike in glucose.  All of these can help avoid the rapid spike in your glucose level that sometimes precedes low glucose levels.          Lotus Meyre, RN, BA, ND, [facility]  Pediatric Endocrinology  [facility] ([place], [place]) [facility]

## 2024-10-04 ENCOUNTER — SPECIALTY PHARMACY (OUTPATIENT)
Dept: PHARMACY | Facility: CLINIC | Age: 44
End: 2024-10-04

## 2024-10-04 PROCEDURE — RXMED WILLOW AMBULATORY MEDICATION CHARGE

## 2024-10-04 NOTE — PROGRESS NOTES
Tristin Sutherland M.D. 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 Pre-Trikafta ppFEV1 in mid 60% decile. Post Trikafta ppFEV1 in the mid 80% decile. Last hospitalization and in August 2018. Historically, difficulty maintaining adequate BMI, had feeding tube placed in his 20's, removed on 6/21/2024. Pre-Trikafta BMI 17-19 kg/m². Post Trikafta BMI stable at around 22.5 kg/m². Last Pseudomonas aeruginosa (November 2016). Last MRSA (March 2016). Occasional Aspergillus fumigatus.     CF complications: EPI on PERT; GERD controlled with omeprazole; suspicion for CF liver disease (Dr. Johnson, March 2023), wanted to get repeat RUQ ultrasound with duplex; osteopenia on DEXA in September 2022; (+) 2-hour OGTT on 3/27/2019 (baseline 97 mg/dL, 2-hour 219) but endocrinology considers him to have impaired glucose tolerance.    HPI (7/8/2024): PEG tube removed on 6/21/2024.  Has not been able to eat or drink to satiety due to leakage from around the PEG tube site.  Appetite intact, but weight down.  Denies steatorrhea.  Feeling good from lung perspective.      HPI (3/11/2024):  In-person visit today; no interval respiratory illnesses; still has some leaking around [place] button but much less than he had with the mini-profile; not doing tube feeding but taking two Boost supplements per day. Resumed taking azithromycin daily after (+) throat swab culture for Pseudomonas fulva in September 2023. Not coughing. Does not expectorate sputum. No wheezing. He is not exercising regularly, but attributes to time limitation. If he misses omeprazole, notices that gastric juice that leaks around PEG tube burns. Interested in research.     HPI (12/6/2023):  Not doing tube feeding.  Tube leaks, would like to get a new button.  Trying to maintain and gain weight using oral supplements but problems obtaining them. No interval need for  antibiotics.  Feels like his respiratory status is good. Not doing aerosols or chest physiotherapy.  Generally not coughing or expectorating sputum.  Denies any side effects associated with Trikafta.  Went to dermatology and had shave biopsy of pigmented lesion of lower back on left.    HPI (9/13/2023): First visit with Froy. Heretofore followed with Dr. Bravo, last visit on 6/12/2023. At that point, standard visit. No exacerbation. Doing tube feeding occasionally with Relizorb. Otherwise standard CF health measures. Since that time, he has done well without need for oral antibiotics. His girlfriend (Gauri) has a grandson in [place] with retinoblastoma, so they have been back and forth over past few months to support Gauri's son and daughter-in-law. Froy wonders whether he can/should have the VZV vaccine. He has also had a spot on skin of back bleed after scratching. Wants MD to take a look.     Current Use of Health Management Strategies:    Respiratory Interventions  Albuterol: Does not use  Pulmozyme: Does not use  Hypertonic saline: Does not use    HFCWO (percussive vest): Does not use  Oscillatory PEP: Does not use  Exercise: Other 2-3 times per week (isometrics)  LTE antagonist: No  Azithromycin: 250 mg daily  Aztreonam lysine ([place]): Does not use  Tobramycin: Does not use    Colistin: Does not use  Meropenem (inhaled): Does not use  Vancomycin (inhaled): Does not use  ICS: Does not use  ICS/LABA: Does not use  LAMA: No  CFTR modulator: Elexacaftor-Tezacaftor-Ivacaftor (Trikafta) Full-dose (2 orange tablets in AM, 1 blue tablet in PM)  High-dose ibuprofen: No   Oxygen: Does not use  Chronic oral antibiotics: Does not use     Digestive Health Interventions    Acid-suppressing medication?: Yes - omeprazole  Using CF vitamins?: Yes  Taking pancreatic enzymes?: Yes - Zenpep 20, 4- 5 capsules with meals, 2-3 with snacks  Any diarrhea?: No  Any steatorrhea?: No  Any constipation?: No  Using laxatives?:  No  Feeding tube?: Yes  Supplemental enteral nutrition?: Yes - Ensure Plus, 2 per day (takes enzymes as if snack).     Pulmonary Function Test Results    PFT (7/8/2024): FEV1 3.64 L (91%), FVC 4.89 L (97%), FEV1/FVC 74; normal spirometry  PFT (3/11/2024): FEV1 3.42 L (85%), FVC 4.89 L (98%), FEV1/FVC 70; normal spirometry  PFT (12/6/2023): FEV1 3.53 L (88%), FVC 4.81 L (96%), FEV1/FVC 73; minimal airflow obstruction  PFT (9/13/2023): FEV1 3.38 L (84%), FVC 4.85 L (97%), FEV1/FVC 70; minimal airflow obstruction.  PFT (6/12/2023): FEV1 3.43 L (85%), FVC 4.79 L (96%), FEV1/FVC 72; normal spirometry  PFT (9/12/2022): FEV1 3.79 L (94%), FVC 4.92 L (98%), FEV1/FVC 77; normal spirometry  PFT (9/11/2018): FEV1 3.06 L (73%), FVC 4.59 L (88%), FEV1/FVC 67; mild airflow obstruction    Current Medications     Current Outpatient Medications   Medication Instructions    blood sugar diagnostic (OneTouch Verio test strips) strip Test blood sugar fasting, 2 hours after your largest meal, and as needed    calcium citrate-vitamin D3 (Citracal+D) 315 mg-5 mcg (200 unit) tablet 2 tablets, oral, Daily    DEKAs Plus, folic acid, 200 mcg-1,000 mcg-10 mg capsule 2 capsules, oral, Daily    elexacaftor-tezacaftor-ivacaft (Trikafta) 100-50-75 mg tablet TAKE TWO (2) ORANGE TABLETS BY MOUTH IN THE MORNING AND ONE (1) BLUE TABLET BY MOUTH IN THE EVENING. TAKE 12 HOURS APART WITH FATTY FOODS.    lipase-protease-amylase (Zenpep) 20,000-63,000- 84,000 unit capsule Take 6 capsules by mouth 3 times a day with meals. May also take 3 capsules with snacks for snacks.    omeprazole (PRILOSEC) 40 mg, oral, Daily before breakfast    OneTouch Delica Plus Lancet 33 gauge misc USE TO CHECK BLOOD SUGAR 3 TIMES DAILY    oxyCODONE (ROXICODONE) 5 mg, oral, Every 6 hours PRN       Physical Examination     There were no vitals taken for this visit.    General: Pleasant man in no distress. Thin but not cachectic. Unaccompanied. Normal vocal character.  HEENT: Good  dentition. Unremarkable nasal mucosa.  Neck: Supple, no lymphadenopathy or thyromegaly  Chest: Clear to auscultation bilaterally.   Cardiac: Regular rhythm, no murmur  Abdomen: Soft, non-tender, non-distended, fistulous tract at site of PEG tube (removed)  Extremities: (+) Mild digital clubbing, no cyanosis or edema  Skin: Multiple nevi.    Metabolic Parameters     Sodium   Date/Time Value Ref Range Status   03/11/2024 09:51  136 - 145 mmol/L Final     Potassium   Date/Time Value Ref Range Status   03/11/2024 09:51 AM 4.4 3.5 - 5.3 mmol/L Final     Chloride   Date/Time Value Ref Range Status   03/11/2024 09:51  98 - 107 mmol/L Final     Bicarbonate   Date/Time Value Ref Range Status   03/11/2024 09:51 AM 28 21 - 32 mmol/L Final     Anion Gap   Date/Time Value Ref Range Status   03/11/2024 09:51 AM 14 10 - 20 mmol/L Final     Urea Nitrogen   Date/Time Value Ref Range Status   03/11/2024 09:51 AM 17 6 - 23 mg/dL Final     Creatinine   Date/Time Value Ref Range Status   03/11/2024 09:51 AM 0.81 0.50 - 1.30 mg/dL Final     GFR MALE   Date/Time Value Ref Range Status   03/13/2023 12:35 PM >90 >90 mL/min/1.73m2 Final     Comment:      CALCULATIONS OF ESTIMATED GFR ARE PERFORMED   USING THE 2021 CKD-EPI STUDY REFIT EQUATION   WITHOUT THE RACE VARIABLE FOR THE IDMS-TRACEABLE   CREATININE METHODS.    https://jasn.asnjournals.org/content/early/2021/09/22/ASN.3706884814       Glucose   Date/Time Value Ref Range Status   03/11/2024 09:51  (H) 74 - 99 mg/dL Final     Calcium   Date/Time Value Ref Range Status   03/11/2024 09:51 AM 9.5 8.6 - 10.6 mg/dL Final     Phosphorus   Date/Time Value Ref Range Status   03/11/2024 09:51 AM 3.3 2.5 - 4.9 mg/dL Final     Comment:     The performance characteristics of phosphorus testing in heparinized plasma have been validated by the individual  laboratory site where testing is performed. Testing on heparinized plasma is not approved by the FDA; however, such approval is  not necessary.     Cholesterol   Date/Time Value Ref Range Status   03/11/2024 09:51  0 - 199 mg/dL Final     Comment:           Age      Desirable   Borderline High   High     0-19 Y     0 - 169       170 - 199     >/= 200    20-24 Y     0 - 189       190 - 224     >/= 225         >24 Y     0 - 199       200 - 239     >/= 240   **All ranges are based on fasting samples. Specific   therapeutic targets will vary based on patient-specific   cardiac risk.    Pediatric guidelines reference:Pediatrics 2011, 128(S5).Adult guidelines reference: NCEP ATPIII Guidelines,EDDIE 2001, 258:2486-97    Venipuncture immediately after or during the administration of Metamizole may lead to falsely low results. Testing should be performed immediately prior to Metamizole dosing.   03/13/2023 12:35  0 - 199 mg/dL Final     Comment:     .      AGE      DESIRABLE   BORDERLINE HIGH   HIGH     0-19 Y     0 - 169       170 - 199     >/= 200    20-24 Y     0 - 189       190 - 224     >/= 225         >24 Y     0 - 199       200 - 239     >/= 240   **All ranges are based on fasting samples. Specific   therapeutic targets will vary based on patient-specific   cardiac risk.  .   Pediatric guidelines reference:Pediatrics 2011, 128(S5).   Adult guidelines reference: NCEP ATPIII Guidelines,     EDDIE 2001, 258:2486-97  .   Venipuncture immediately after or during the    administration of Metamizole may lead to falsely   low results. Testing should be performed immediately   prior to Metamizole dosing.     09/12/2022 02:40  0 - 199 mg/dL Final     Comment:     .      AGE      DESIRABLE   BORDERLINE HIGH   HIGH     0-19 Y     0 - 169       170 - 199     >/= 200    20-24 Y     0 - 189       190 - 224     >/= 225         >24 Y     0 - 199       200 - 239     >/= 240   **All ranges are based on fasting samples. Specific   therapeutic targets will vary based on patient-specific   cardiac risk.  .   Pediatric guidelines reference:Pediatrics  2011, 128(S5).   Adult guidelines reference: NCEP ATPIII Guidelines,     EDDIE 2001, 258:2486-97  .   Venipuncture immediately after or during the    administration of Metamizole may lead to falsely   low results. Testing should be performed immediately   prior to Metamizole dosing.       HDL-Cholesterol   Date/Time Value Ref Range Status   03/11/2024 09:51 AM 57.7 mg/dL Final     Comment:       Age       Very Low   Low     Normal    High    0-19 Y    < 35      < 40     40-45     ----  20-24 Y    ----     < 40      >45      ----        >24 Y      ----     < 40     40-60      >60       HDL   Date/Time Value Ref Range Status   03/13/2023 12:35 PM 66.4 mg/dL Final     Comment:     .      AGE      VERY LOW   LOW     NORMAL    HIGH       0-19 Y       < 35   < 40     40-45     ----    20-24 Y       ----   < 40       >45     ----      >24 Y       ----   < 40     40-60      >60  .     09/12/2022 02:40 PM 50.2 mg/dL Final     Comment:     .      AGE      VERY LOW   LOW     NORMAL    HIGH       0-19 Y       < 35   < 40     40-45     ----    20-24 Y       ----   < 40       >45     ----      >24 Y       ----   < 40     40-60      >60  .       Triglycerides   Date/Time Value Ref Range Status   03/11/2024 09:51  0 - 149 mg/dL Final     Comment:        Age         Desirable   Borderline High   High     Very High   0 D-90 D    19 - 174         ----         ----        ----  91 D- 9 Y     0 -  74        75 -  99     >/= 100      ----    10-19 Y     0 -  89        90 - 129     >/= 130      ----    20-24 Y     0 - 114       115 - 149     >/= 150      ----         >24 Y     0 - 149       150 - 199    200- 499    >/= 500    Venipuncture immediately after or during the administration of Metamizole may lead to falsely low results. Testing should be performed immediately prior to Metamizole dosing.   03/13/2023 12:35 PM 93 0 - 149 mg/dL Final     Comment:     .      AGE      DESIRABLE   BORDERLINE HIGH   HIGH     VERY HIGH   0 D-90 D     19 - 174         ----         ----        ----  91 D- 9 Y     0 -  74        75 -  99     >/= 100      ----    10-19 Y     0 -  89        90 - 129     >/= 130      ----    20-24 Y     0 - 114       115 - 149     >/= 150      ----         >24 Y     0 - 149       150 - 199    200- 499    >/= 500  .   Venipuncture immediately after or during the    administration of Metamizole may lead to falsely   low results. Testing should be performed immediately   prior to Metamizole dosing.     09/12/2022 02:40  0 - 149 mg/dL Final     Comment:     .      AGE      DESIRABLE   BORDERLINE HIGH   HIGH     VERY HIGH   0 D-90 D    19 - 174         ----         ----        ----  91 D- 9 Y     0 -  74        75 -  99     >/= 100      ----    10-19 Y     0 -  89        90 - 129     >/= 130      ----    20-24 Y     0 - 114       115 - 149     >/= 150      ----         >24 Y     0 - 149       150 - 199    200- 499    >/= 500  .   Venipuncture immediately after or during the    administration of Metamizole may lead to falsely   low results. Testing should be performed immediately   prior to Metamizole dosing.         Hematologic Parameters     WBC   Date/Time Value Ref Range Status   03/11/2024 09:51 AM 5.1 4.4 - 11.3 x10*3/uL Final     Neutrophils Absolute   Date/Time Value Ref Range Status   03/11/2024 09:51 AM 2.65 1.20 - 7.70 x10*3/uL Final     Comment:     Percent differential counts (%) should be interpreted in the context of the absolute cell counts (cells/uL).     Neutrophils %   Date/Time Value Ref Range Status   03/11/2024 09:51 AM 52.0 40.0 - 80.0 % Final     Lymphocytes %   Date/Time Value Ref Range Status   03/11/2024 09:51 AM 34.8 13.0 - 44.0 % Final     Monocytes %   Date/Time Value Ref Range Status   03/11/2024 09:51 AM 6.3 2.0 - 10.0 % Final     Basophils %   Date/Time Value Ref Range Status   03/11/2024 09:51 AM 1.4 0.0 - 2.0 % Final     Eosinophils Absolute   Date/Time Value Ref Range Status   03/11/2024 09:51 AM  0.27 0.00 - 0.70 x10*3/uL Final     Eosinophils %   Date/Time Value Ref Range Status   03/11/2024 09:51 AM 5.3 0.0 - 6.0 % Final     Hemoglobin   Date/Time Value Ref Range Status   03/11/2024 09:51 AM 14.5 13.5 - 17.5 g/dL Final     Hematocrit   Date/Time Value Ref Range Status   03/11/2024 09:51 AM 45.3 41.0 - 52.0 % Final     RBC   Date/Time Value Ref Range Status   03/11/2024 09:51 AM 4.67 4.50 - 5.90 x10*6/uL Final     MCV   Date/Time Value Ref Range Status   03/11/2024 09:51 AM 97 80 - 100 fL Final     MCHC   Date/Time Value Ref Range Status   03/11/2024 09:51 AM 32.0 32.0 - 36.0 g/dL Final     Platelets   Date/Time Value Ref Range Status   03/11/2024 09:51  150 - 450 x10*3/uL Final       Fat-Soluble Vitamin Levels     Vitamin D, 25-Hydroxy, Total   Date/Time Value Ref Range Status   03/11/2024 09:51 AM 46 30 - 100 ng/mL Final     Vitamin A (Retinol)   Date/Time Value Ref Range Status   03/11/2024 09:51 AM 0.48 0.30 - 1.20 mg/L Final     Vitamin A, Interpretation   Date/Time Value Ref Range Status   03/11/2024 09:51 AM Normal  Final     Comment:       This test was developed and its performance characteristics   determined by [facility]. It has not been cleared or   approved by the US Food and Drug Administration. This test was   performed in a CLIA certified laboratory and is intended for   clinical purposes.  Performed By: [facility]  [address] [place] [id number]  : Henrry Chapa MD, PhD  CLIA Number: [id number]     Vitamin E (Alpha-Tocopherol)   Date/Time Value Ref Range Status   03/11/2024 09:51 AM 10.5 5.5 - 18.0 mg/L Final     Comment:       This test was developed and its performance characteristics   determined by [facility]. It has not been cleared or   approved by the US Food and Drug Administration. This test was   performed in a CLIA certified laboratory and is intended for   clinical purposes.     Vitamin E (Gamma-Tocopherol)    Date/Time Value Ref Range Status   03/11/2024 09:51 AM 1.2 0.0 - 6.0 mg/L Final     Comment:     Performed By: [facility]  [address] [place] [address]  : Henrry Chapa MD, PhD  CLIA Number: [id number]       LFT and Associated Parameters     AST   Date/Time Value Ref Range Status   03/11/2024 09:51 AM 28 9 - 39 U/L Final     ALT   Date/Time Value Ref Range Status   03/11/2024 09:51 AM 43 10 - 52 U/L Final     Comment:     Patients treated with Sulfasalazine may generate falsely decreased results for ALT.     Alkaline Phosphatase   Date/Time Value Ref Range Status   03/11/2024 09:51  33 - 120 U/L Final     Bilirubin, Total   Date/Time Value Ref Range Status   03/11/2024 09:51 AM 0.8 0.0 - 1.2 mg/dL Final     Bilirubin, Direct   Date/Time Value Ref Range Status   03/11/2024 09:51 AM 0.1 0.0 - 0.3 mg/dL Final     GGT   Date/Time Value Ref Range Status   03/11/2024 09:51 AM 16 5 - 64 U/L Final     Albumin   Date/Time Value Ref Range Status   03/11/2024 09:51 AM 4.3 3.4 - 5.0 g/dL Final     Total Protein   Date/Time Value Ref Range Status   03/11/2024 09:51 AM 6.4 6.4 - 8.2 g/dL Final       Coagulation Parameters     Protime   Date/Time Value Ref Range Status   09/12/2022 02:40 PM 11.3 9.8 - 13.4 sec Final     INR   Date/Time Value Ref Range Status   09/12/2022 02:40 PM 1.0 0.9 - 1.1 Final       Diabetes Laboratory Tests     POC HEMOGLOBIN A1c   Date/Time Value Ref Range Status   08/15/2024 08:19 AM 5.8 4.2 - 6.5 % Final     Albumin/Creatine Ratio   Date/Time Value Ref Range Status   03/13/2023 12:35 PM SEE COMMENT 0.0 - 30.0 ug/mg crt Final     Comment:     One or more analytes used in this calculation   is outside of the analytical measurement range.  Calculation cannot be performed.       Glucose tolerance test Fasting   Date/Time Value Ref Range Status   03/27/2019 11:35 AM 97 <126 mg/dL Final     Glucose tolerance test Two Hour   Date/Time Value Ref Range Status    03/27/2019 11:35  (A) <200 mg/dL Final        Immunology Laboratory Tests     IgE   Date/Time Value Ref Range Status   03/11/2024 09:51 AM 6 0 - 214 IU/mL Final     Colonoscopy     Colonoscopy (7/3/2024)  Findings  The terminal ileum appeared normal.;  Aphthous, superficial ulcer in the distal rectum  A moderate amount of semi-liquid stool was visualized throughout the colon.  Lavage was performed with incomplete clearance and fair visualization.  One 3 mm sessile polyp in the descending colon; performed cold snare with complete en bloc removal and retrieved specimen  Internal small hemorrhoids observed during retroflexion; no bleeding was identified    A. COLON - DESCENDING POLYP, BIOPSY:            -Tubular adenoma.    DEXA Scan     XR BONE density 11/28/2022    Narrative  Name: ANGY QUINONEZ  Patient ID: [id number] YOB: 1980 Height: 69.0 in.  Gender:     Male     Exam Date:  11/28/2022 Weight: 147.0 lbs.  Indications: Cystic Fibrosis, malabsorption    Treatments:  Calcium (E943.0), FLUTICASONE, OMEPRAZOLE, Vitamin D  (E933.5)    LEFT FEMUR - TOTAL  The bone mineral density : 0.926 g/cm2  T-score : -1.2    % of young normal mean :84%  Z-score : -1.0    % of age matched mean : 87%  % change vs. Previous : -0.1%    % change vs. Baseline : -1.8%    LEFT FEMUR - NECK  The bone mineral density : 0.882 g/cm2  T-score : -1.4    % of young normal mean: 82%  Z-score : -1.1    % of age matched mean : 86%  % change vs. Previous : 0.1%    % change vs. Baseline : 0.6%    SPINE L1-L4  The bone mineral density is 0.934 g/cm2  T-score : -2.4   % of young normal mean is 77%  Z-score : -2.4    % of age matched mean is 77%  % change vs. Previous : -2.4%    % change vs. Baseline : 0.1%    World Health Organization (WHO) criteria for post-menopausal,   Women:  Normal:       T-score at or above -1 SD  Osteopenia:   T-score between -1 and -2.5 SD  Osteoporosis: T-score at or below -2.5 SD    10-Year  "Fracture Risk:  Major Osteoporotic Fracture N/A  Hip Fracture                N/A  Reported Risk Factors       None    Interpretation:  According to World Health Organization criteria, classification is low bone mass.  Followup recommended on November 2024 or sooner as clinically warranted.    Chest Radiograph     XR chest 2 view 11/28/2022    Narrative  MRN: [id number]  Patient Name: ANGY QUINONEZ    STUDY:  TH CHEST 2 VIEW PA AND LAT;    INDICATION:  CF  R63.6: Underweight.    COMPARISON:  09/26/2018.    ACCESSION NUMBER(S):  [id number]    ORDERING CLINICIAN:  GABY LOPEZ    FINDINGS:  The cardiac silhouette size is within normal limits.  There is no focal consolidation, edema or pneumothorax.  No sizeable pleural effusion.    Impression  No acute cardiopulmonary process.    CF Sputum Culture     No results found for: \"AFBCX\"     Respiratory Culture, Cystic Fibrosis   Date/Time Value Ref Range Status   07/08/2024 11:40 AM (2+) Few Normal throat scarlet  Final   07/08/2024 11:40 AM (A)  Final    (1+) Rare Methicillin Susceptible Staphylococcus aureus (MSSA)   03/11/2024 10:16 AM (2+) Few Normal throat scarlet  Final   12/06/2023 03:03 PM (1+) Rare Normal throat scarlet  Final   09/13/2023 12:05 PM 1+ NORMAL THROAT SCARLET. (A)  Final   09/13/2023 12:05 PM Pseudomonas (A)  Final     Comment:     2+~FURTHER IDENTIFIED AS PSEUDOMONAS FULVA       Susceptibility data from last 3000 days.  Collected Specimen Info Organism Amikacin Aztreonam Cefepime Ceftazidime Ciprofloxacin Clindamycin Erythromycin Meropenem Oxacillin Piperacillin/Tazobactam Tetracycline Tobramycin Trimethoprim/Sulfamethoxazole Vancomycin   07/08/24 Fluid from Throat Swab Methicillin Susceptible Staphylococcus aureus (MSSA)       S  R   S   S   S  S     Normal throat scarlet                 03/11/24 Fluid from Throat Swab Normal throat scarlet                 12/06/23 Fluid from Throat Swab Normal throat scarlet                 09/13/23 Respiratory Pseudomonas S I " S S S   S  I  S         Problem List Items Addressed This Visit    None      Follow-up:  10/9/2024    Duke Chatman MD   10/09/2024

## 2024-10-08 ENCOUNTER — PHARMACY VISIT (OUTPATIENT)
Dept: PHARMACY | Facility: CLINIC | Age: 44
End: 2024-10-08
Payer: COMMERCIAL

## 2024-10-09 ENCOUNTER — APPOINTMENT (OUTPATIENT)
Dept: RESPIRATORY THERAPY | Facility: HOSPITAL | Age: 44
End: 2024-10-09
Payer: MEDICARE

## 2024-10-09 ENCOUNTER — APPOINTMENT (OUTPATIENT)
Dept: PEDIATRIC PULMONOLOGY | Facility: HOSPITAL | Age: 44
End: 2024-10-09
Payer: MEDICARE

## 2024-10-11 NOTE — PROGRESS NOTES
Tristin Sutherland M.D. 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 Pre-Trikafta ppFEV1 in mid 60% decile. Post Trikafta ppFEV1 in the mid 80% decile. Last hospitalization and in August 2018. Historically, difficulty maintaining adequate BMI, had feeding tube placed in his 20's, removed on 6/21/2024. Pre-Trikafta BMI 17-19 kg/m². Post Trikafta BMI stable at around 22.5 kg/m². Last Pseudomonas aeruginosa (November 2016). Last MRSA (March 2016). Occasional Aspergillus fumigatus.     CF complications: EPI on PERT; GERD controlled with omeprazole; suspicion for CF liver disease (Dr. Johnson, March 2023), wanted to get repeat RUQ ultrasound with duplex; osteopenia on DEXA in September 2022; (+) 2-hour OGTT on 3/27/2019 (baseline 97 mg/dL, 2-hour 219) but endocrinology considers him to have impaired glucose tolerance.    HPI (10/16/2024): Here with Gauri; Gastrocutaneous fistula repair with Dr. Dunlap on 8/19/2024.  Has gained some weight because he now does not need to avoid intake due to precipitating leakage from the fistula.  Has done well in the interim with only a single episode on 9/5/2024 of acute sinuitis treated with Augmentin 875-125 mg twice daily x 10 days, ending 9/15/2024.  Today, he feels like he recovered fully from that episode.    HPI (7/8/2024): PEG tube removed on 6/21/2024.  Has not been able to eat or drink to satiety due to leakage from around the PEG tube site.  Appetite intact, but weight down.  Denies steatorrhea.  Feeling good from lung perspective.      HPI (3/11/2024):  In-person visit today; no interval respiratory illnesses; still has some leaking around [place] button but much less than he had with the mini-profile; not doing tube feeding but taking two Boost supplements per day. Resumed taking azithromycin daily after (+) throat swab culture for Pseudomonas fulva in September 2023. Not  coughing. Does not expectorate sputum. No wheezing. He is not exercising regularly, but attributes to time limitation. If he misses omeprazole, notices that gastric juice that leaks around PEG tube burns. Interested in research.     HPI (12/6/2023):  Not doing tube feeding.  Tube leaks, would like to get a new button.  Trying to maintain and gain weight using oral supplements but problems obtaining them. No interval need for antibiotics.  Feels like his respiratory status is good. Not doing aerosols or chest physiotherapy.  Generally not coughing or expectorating sputum.  Denies any side effects associated with Trikafta.  Went to dermatology and had shave biopsy of pigmented lesion of lower back on left.    HPI (9/13/2023): First visit with Froy. Heretofore followed with Dr. Bravo, last visit on 6/12/2023. At that point, standard visit. No exacerbation. Doing tube feeding occasionally with Relizorb. Otherwise standard CF health measures. Since that time, he has done well without need for oral antibiotics. His girlfriend (Gauri) has a grandson in [place] with retinoblastoma, so they have been back and forth over past few months to support Gauri's son and daughter-in-law. Froy wonders whether he can/should have the VZV vaccine. He has also had a spot on skin of back bleed after scratching. Wants MD to take a look.     Current Use of Health Management Strategies:    Respiratory Interventions  Albuterol: Does not use  Pulmozyme: Does not use  Hypertonic saline: Does not use    HFCWO (percussive vest): Does not use  Oscillatory PEP: Does not use  Exercise: Weightlifting 2-3 times per week  LTE antagonist: No  Azithromycin: Does not use  Aztreonam lysine ([place]): Does not use  Tobramycin: Does not use    Colistin: Does not use  Meropenem (inhaled): Does not use  Vancomycin (inhaled): Does not use  ICS: Does not use  ICS/LABA: Does not use  LAMA: No  CFTR modulator: Elexacaftor-Tezacaftor-Ivacaftor (Trikafta)  Full-dose (2 orange tablets in AM, 1 blue tablet in PM)  High-dose ibuprofen: No   Oxygen: Does not use  Chronic oral antibiotics: Does not use     Digestive Health Interventions    Acid-suppressing medication?: Yes - omeprazole  Using CF vitamins?: Yes  Taking pancreatic enzymes?: Yes - Zenpep 20, 4- 5 capsules with meals, 2-3 with snacks  Any diarrhea?: No  Any steatorrhea?: No  Any constipation?: No  Using laxatives?: No  Feeding tube?: Yes  Supplemental enteral nutrition?: Yes - Ensure Plus, 2 per day (takes enzymes as if snack).     Pulmonary Function Test Results    PFT (10/16/2024): FEV1 3.38 L (89%), FVC 5.08 L (109%), FEV1/FVC 67; mild airflow obstruction  PFT (7/8/2024): FEV1 3.64 L (91%), FVC 4.89 L (97%), FEV1/FVC 74; normal spirometry  PFT (3/11/2024): FEV1 3.42 L (85%), FVC 4.89 L (98%), FEV1/FVC 70; normal spirometry  PFT (12/6/2023): FEV1 3.53 L (88%), FVC 4.81 L (96%), FEV1/FVC 73; minimal airflow obstruction  PFT (9/13/2023): FEV1 3.38 L (84%), FVC 4.85 L (97%), FEV1/FVC 70; minimal airflow obstruction.  PFT (6/12/2023): FEV1 3.43 L (85%), FVC 4.79 L (96%), FEV1/FVC 72; normal spirometry  PFT (9/12/2022): FEV1 3.79 L (94%), FVC 4.92 L (98%), FEV1/FVC 77; normal spirometry  PFT (9/11/2018): FEV1 3.06 L (73%), FVC 4.59 L (88%), FEV1/FVC 67; mild airflow obstruction    Current Medications     Current Outpatient Medications   Medication Instructions    blood sugar diagnostic (OneTouch Verio test strips) strip Test blood sugar fasting, 2 hours after your largest meal, and as needed    calcium citrate-vitamin D3 (Citracal+D) 315 mg-5 mcg (200 unit) tablet 2 tablets, oral, Daily    DEKAs Plus, folic acid, 200 mcg-1,000 mcg-10 mg capsule 2 capsules, oral, Daily    elexacaftor-tezacaftor-ivacaft (Trikafta) 100-50-75 mg tablet TAKE TWO (2) ORANGE TABLETS BY MOUTH IN THE MORNING AND ONE (1) BLUE TABLET BY MOUTH IN THE EVENING. TAKE 12 HOURS APART WITH FATTY FOODS.    lipase-protease-amylase (Zenpep)  "20,000-63,000- 84,000 unit capsule Take 6 capsules by mouth 3 times a day with meals. May also take 3 capsules with snacks for snacks.    omeprazole (PRILOSEC) 40 mg, oral, Daily before breakfast    OneTouch Delica Plus Lancet 33 gauge misc USE TO CHECK BLOOD SUGAR 3 TIMES DAILY    oxyCODONE (ROXICODONE) 5 mg, oral, Every 6 hours PRN       Physical Examination     BP 98/64 (BP Location: Right arm, Patient Position: Sitting, BP Cuff Size: Adult)   Pulse 70   Temp 36.9 °C (98.5 °F) (Oral)   Resp 20   Ht 1.753 m (5' 9.02\")   Wt 69.3 kg (152 lb 12.5 oz)   SpO2 95%   BMI 22.55 kg/m²     Daily Weight  10/16/24 : 69.3 kg (152 lb 12.5 oz)  09/05/24 : 68 kg (150 lb)  09/03/24 : 65.8 kg (145 lb)  08/19/24 : 68 kg (150 lb)  08/14/24 : 66.8 kg (147 lb 6 oz)  08/01/24 : 65.8 kg (145 lb)  07/08/24 : 67 kg (147 lb 9.6 oz)  06/28/24 : 68.9 kg (152 lb)     General: Pleasant man in no distress. Well-appearing.  Some weight gain evident.  HEENT: Good dentition. Unremarkable nasal mucosa.  Neck: Supple, no lymphadenopathy or thyromegaly  Chest: Clear to auscultation bilaterally.   Cardiac: Regular rhythm, no murmur  Abdomen: Soft, non-tender, non-distended, well-healed transverse incision from gastrocutaneous fistula repair.  Extremities: (+) Mild digital clubbing, no cyanosis or edema  Skin: Multiple nevi.    Metabolic Parameters     Sodium   Date/Time Value Ref Range Status   03/11/2024 09:51  136 - 145 mmol/L Final     Potassium   Date/Time Value Ref Range Status   03/11/2024 09:51 AM 4.4 3.5 - 5.3 mmol/L Final     Chloride   Date/Time Value Ref Range Status   03/11/2024 09:51  98 - 107 mmol/L Final     Bicarbonate   Date/Time Value Ref Range Status   03/11/2024 09:51 AM 28 21 - 32 mmol/L Final     Anion Gap   Date/Time Value Ref Range Status   03/11/2024 09:51 AM 14 10 - 20 mmol/L Final     Urea Nitrogen   Date/Time Value Ref Range Status   03/11/2024 09:51 AM 17 6 - 23 mg/dL Final     Creatinine   Date/Time " Value Ref Range Status   03/11/2024 09:51 AM 0.81 0.50 - 1.30 mg/dL Final     GFR MALE   Date/Time Value Ref Range Status   03/13/2023 12:35 PM >90 >90 mL/min/1.73m2 Final     Comment:      CALCULATIONS OF ESTIMATED GFR ARE PERFORMED   USING THE 2021 CKD-EPI STUDY REFIT EQUATION   WITHOUT THE RACE VARIABLE FOR THE IDMS-TRACEABLE   CREATININE METHODS.    https://jasn.asnjournals.org/content/early/2021/09/22/ASN.[email or web address]       Glucose   Date/Time Value Ref Range Status   03/11/2024 09:51  (H) 74 - 99 mg/dL Final     Calcium   Date/Time Value Ref Range Status   03/11/2024 09:51 AM 9.5 8.6 - 10.6 mg/dL Final     Phosphorus   Date/Time Value Ref Range Status   03/11/2024 09:51 AM 3.3 2.5 - 4.9 mg/dL Final     Comment:     The performance characteristics of phosphorus testing in heparinized plasma have been validated by the individual  laboratory site where testing is performed. Testing on heparinized plasma is not approved by the FDA; however, such approval is not necessary.     Cholesterol   Date/Time Value Ref Range Status   03/11/2024 09:51  0 - 199 mg/dL Final     Comment:           Age      Desirable   Borderline High   High     0-19 Y     0 - 169       170 - 199     >/= 200    20-24 Y     0 - 189       190 - 224     >/= 225         >24 Y     0 - 199       200 - 239     >/= 240   **All ranges are based on fasting samples. Specific   therapeutic targets will vary based on patient-specific   cardiac risk.    Pediatric guidelines reference:Pediatrics 2011, 128(S5).Adult guidelines reference: NCEP ATPIII Guidelines,EDDIE 2001, 258:2486-97    Venipuncture immediately after or during the administration of Metamizole may lead to falsely low results. Testing should be performed immediately prior to Metamizole dosing.   03/13/2023 12:35  0 - 199 mg/dL Final     Comment:     .      AGE      DESIRABLE   BORDERLINE HIGH   HIGH     0-19 Y     0 - 169       170 - 199     >/= 200    20-24 Y     0 - 189        190 - 224     >/= 225         >24 Y     0 - 199       200 - 239     >/= 240   **All ranges are based on fasting samples. Specific   therapeutic targets will vary based on patient-specific   cardiac risk.  .   Pediatric guidelines reference:Pediatrics 2011, 128(S5).   Adult guidelines reference: NCEP ATPIII Guidelines,     EDDIE 2001, 258:5896-97  .   Venipuncture immediately after or during the    administration of Metamizole may lead to falsely   low results. Testing should be performed immediately   prior to Metamizole dosing.     09/12/2022 02:40  0 - 199 mg/dL Final     Comment:     .      AGE      DESIRABLE   BORDERLINE HIGH   HIGH     0-19 Y     0 - 169       170 - 199     >/= 200    20-24 Y     0 - 189       190 - 224     >/= 225         >24 Y     0 - 199       200 - 239     >/= 240   **All ranges are based on fasting samples. Specific   therapeutic targets will vary based on patient-specific   cardiac risk.  .   Pediatric guidelines reference:Pediatrics 2011, 128(S5).   Adult guidelines reference: NCEP ATPIII Guidelines,     EDDIE 2001, 258:2486-97  .   Venipuncture immediately after or during the    administration of Metamizole may lead to falsely   low results. Testing should be performed immediately   prior to Metamizole dosing.       HDL-Cholesterol   Date/Time Value Ref Range Status   03/11/2024 09:51 AM 57.7 mg/dL Final     Comment:       Age       Very Low   Low     Normal    High    0-19 Y    < 35      < 40     40-45     ----  20-24 Y    ----     < 40      >45      ----        >24 Y      ----     < 40     40-60      >60       HDL   Date/Time Value Ref Range Status   03/13/2023 12:35 PM 66.4 mg/dL Final     Comment:     .      AGE      VERY LOW   LOW     NORMAL    HIGH       0-19 Y       < 35   < 40     40-45     ----    20-24 Y       ----   < 40       >45     ----      >24 Y       ----   < 40     40-60      >60  .     09/12/2022 02:40 PM 50.2 mg/dL Final     Comment:     .      AGE      VERY LOW    LOW     NORMAL    HIGH       0-19 Y       < 35   < 40     40-45     ----    20-24 Y       ----   < 40       >45     ----      >24 Y       ----   < 40     40-60      >60  .       Triglycerides   Date/Time Value Ref Range Status   03/11/2024 09:51  0 - 149 mg/dL Final     Comment:        Age         Desirable   Borderline High   High     Very High   0 D-90 D    19 - 174         ----         ----        ----  91 D- 9 Y     0 -  74        75 -  99     >/= 100      ----    10-19 Y     0 -  89        90 - 129     >/= 130      ----    20-24 Y     0 - 114       115 - 149     >/= 150      ----         >24 Y     0 - 149       150 - 199    200- 499    >/= 500    Venipuncture immediately after or during the administration of Metamizole may lead to falsely low results. Testing should be performed immediately prior to Metamizole dosing.   03/13/2023 12:35 PM 93 0 - 149 mg/dL Final     Comment:     .      AGE      DESIRABLE   BORDERLINE HIGH   HIGH     VERY HIGH   0 D-90 D    19 - 174         ----         ----        ----  91 D- 9 Y     0 -  74        75 -  99     >/= 100      ----    10-19 Y     0 -  89        90 - 129     >/= 130      ----    20-24 Y     0 - 114       115 - 149     >/= 150      ----         >24 Y     0 - 149       150 - 199    200- 499    >/= 500  .   Venipuncture immediately after or during the    administration of Metamizole may lead to falsely   low results. Testing should be performed immediately   prior to Metamizole dosing.     09/12/2022 02:40  0 - 149 mg/dL Final     Comment:     .      AGE      DESIRABLE   BORDERLINE HIGH   HIGH     VERY HIGH   0 D-90 D    19 - 174         ----         ----        ----  91 D- 9 Y     0 -  74        75 -  99     >/= 100      ----    10-19 Y     0 -  89        90 - 129     >/= 130      ----    20-24 Y     0 - 114       115 - 149     >/= 150      ----         >24 Y     0 - 149       150 - 199    200- 499    >/= 500  .   Venipuncture immediately after or during  the    administration of Metamizole may lead to falsely   low results. Testing should be performed immediately   prior to Metamizole dosing.         Hematologic Parameters     WBC   Date/Time Value Ref Range Status   03/11/2024 09:51 AM 5.1 4.4 - 11.3 x10*3/uL Final     Neutrophils Absolute   Date/Time Value Ref Range Status   03/11/2024 09:51 AM 2.65 1.20 - 7.70 x10*3/uL Final     Comment:     Percent differential counts (%) should be interpreted in the context of the absolute cell counts (cells/uL).     Neutrophils %   Date/Time Value Ref Range Status   03/11/2024 09:51 AM 52.0 40.0 - 80.0 % Final     Lymphocytes %   Date/Time Value Ref Range Status   03/11/2024 09:51 AM 34.8 13.0 - 44.0 % Final     Monocytes %   Date/Time Value Ref Range Status   03/11/2024 09:51 AM 6.3 2.0 - 10.0 % Final     Basophils %   Date/Time Value Ref Range Status   03/11/2024 09:51 AM 1.4 0.0 - 2.0 % Final     Eosinophils Absolute   Date/Time Value Ref Range Status   03/11/2024 09:51 AM 0.27 0.00 - 0.70 x10*3/uL Final     Eosinophils %   Date/Time Value Ref Range Status   03/11/2024 09:51 AM 5.3 0.0 - 6.0 % Final     Hemoglobin   Date/Time Value Ref Range Status   03/11/2024 09:51 AM 14.5 13.5 - 17.5 g/dL Final     Hematocrit   Date/Time Value Ref Range Status   03/11/2024 09:51 AM 45.3 41.0 - 52.0 % Final     RBC   Date/Time Value Ref Range Status   03/11/2024 09:51 AM 4.67 4.50 - 5.90 x10*6/uL Final     MCV   Date/Time Value Ref Range Status   03/11/2024 09:51 AM 97 80 - 100 fL Final     MCHC   Date/Time Value Ref Range Status   03/11/2024 09:51 AM 32.0 32.0 - 36.0 g/dL Final     Platelets   Date/Time Value Ref Range Status   03/11/2024 09:51  150 - 450 x10*3/uL Final       Fat-Soluble Vitamin Levels     Vitamin D, 25-Hydroxy, Total   Date/Time Value Ref Range Status   03/11/2024 09:51 AM 46 30 - 100 ng/mL Final     Vitamin A (Retinol)   Date/Time Value Ref Range Status   03/11/2024 09:51 AM 0.48 0.30 - 1.20 mg/L Final      Vitamin A, Interpretation   Date/Time Value Ref Range Status   03/11/2024 09:51 AM Normal  Final     Comment:       This test was developed and its performance characteristics   determined by [facility]. It has not been cleared or   approved by the US Food and Drug Administration. This test was   performed in a CLIA certified laboratory and is intended for   clinical purposes.  Performed By: [facility]  [address] [place], [place] [id number]  : Henrry Chapa MD, PhD  CLIA Number: [id number]     Vitamin E (Alpha-Tocopherol)   Date/Time Value Ref Range Status   03/11/2024 09:51 AM 10.5 5.5 - 18.0 mg/L Final     Comment:       This test was developed and its performance characteristics   determined by [facility]. It has not been cleared or   approved by the US Food and Drug Administration. This test was   performed in a CLIA certified laboratory and is intended for   clinical purposes.     Vitamin E (Gamma-Tocopherol)   Date/Time Value Ref Range Status   03/11/2024 09:51 AM 1.2 0.0 - 6.0 mg/L Final     Comment:     Performed By: [facility]  [address] [place] [address]  : Henrry Chapa MD, PhD  CLIA Number: [id number]       LFT and Associated Parameters     AST   Date/Time Value Ref Range Status   03/11/2024 09:51 AM 28 9 - 39 U/L Final     ALT   Date/Time Value Ref Range Status   03/11/2024 09:51 AM 43 10 - 52 U/L Final     Comment:     Patients treated with Sulfasalazine may generate falsely decreased results for ALT.     Alkaline Phosphatase   Date/Time Value Ref Range Status   03/11/2024 09:51  33 - 120 U/L Final     Bilirubin, Total   Date/Time Value Ref Range Status   03/11/2024 09:51 AM 0.8 0.0 - 1.2 mg/dL Final     Bilirubin, Direct   Date/Time Value Ref Range Status   03/11/2024 09:51 AM 0.1 0.0 - 0.3 mg/dL Final     GGT   Date/Time Value Ref Range Status   03/11/2024 09:51 AM 16 5 - 64 U/L Final     Albumin    Date/Time Value Ref Range Status   03/11/2024 09:51 AM 4.3 3.4 - 5.0 g/dL Final     Total Protein   Date/Time Value Ref Range Status   03/11/2024 09:51 AM 6.4 6.4 - 8.2 g/dL Final       Coagulation Parameters     Protime   Date/Time Value Ref Range Status   09/12/2022 02:40 PM 11.3 9.8 - 13.4 sec Final     INR   Date/Time Value Ref Range Status   09/12/2022 02:40 PM 1.0 0.9 - 1.1 Final       Diabetes Laboratory Tests     POC HEMOGLOBIN A1c   Date/Time Value Ref Range Status   08/15/2024 08:19 AM 5.8 4.2 - 6.5 % Final     Albumin/Creatine Ratio   Date/Time Value Ref Range Status   03/13/2023 12:35 PM SEE COMMENT 0.0 - 30.0 ug/mg crt Final     Comment:     One or more analytes used in this calculation   is outside of the analytical measurement range.  Calculation cannot be performed.       Glucose tolerance test Fasting   Date/Time Value Ref Range Status   03/27/2019 11:35 AM 97 <126 mg/dL Final     Glucose tolerance test Two Hour   Date/Time Value Ref Range Status   03/27/2019 11:35  (A) <200 mg/dL Final        Immunology Laboratory Tests     IgE   Date/Time Value Ref Range Status   03/11/2024 09:51 AM 6 0 - 214 IU/mL Final     Colonoscopy     Colonoscopy (7/3/2024)  Findings  The terminal ileum appeared normal.;  Aphthous, superficial ulcer in the distal rectum  A moderate amount of semi-liquid stool was visualized throughout the colon.  Lavage was performed with incomplete clearance and fair visualization.  One 3 mm sessile polyp in the descending colon; performed cold snare with complete en bloc removal and retrieved specimen  Internal small hemorrhoids observed during retroflexion; no bleeding was identified    A. COLON - DESCENDING POLYP, BIOPSY:            -Tubular adenoma.    DEXA Scan     XR BONE density 11/28/2022    Narrative  Name: ANGY QUINONEZ  Patient ID: [id number] YOB: 1980 Height: 69.0 in.  Gender:     Male     Exam Date:  11/28/2022 Weight: 147.0 lbs.  Indications: Cystic  Fibrosis, malabsorption    Treatments:  Calcium (E943.0), FLUTICASONE, OMEPRAZOLE, Vitamin D  (E933.5)    LEFT FEMUR - TOTAL  The bone mineral density : 0.926 g/cm2  T-score : -1.2    % of young normal mean :84%  Z-score : -1.0    % of age matched mean : 87%  % change vs. Previous : -0.1%    % change vs. Baseline : -1.8%    LEFT FEMUR - NECK  The bone mineral density : 0.882 g/cm2  T-score : -1.4    % of young normal mean: 82%  Z-score : -1.1    % of age matched mean : 86%  % change vs. Previous : 0.1%    % change vs. Baseline : 0.6%    SPINE L1-L4  The bone mineral density is 0.934 g/cm2  T-score : -2.4   % of young normal mean is 77%  Z-score : -2.4    % of age matched mean is 77%  % change vs. Previous : -2.4%    % change vs. Baseline : 0.1%    World Health Organization (WHO) criteria for post-menopausal,   Women:  Normal:       T-score at or above -1 SD  Osteopenia:   T-score between -1 and -2.5 SD  Osteoporosis: T-score at or below -2.5 SD    10-Year Fracture Risk:  Major Osteoporotic Fracture N/A  Hip Fracture                N/A  Reported Risk Factors       None    Interpretation:  According to World Health Organization criteria, classification is low bone mass.  Followup recommended on November 2024 or sooner as clinically warranted.    Chest Radiograph     XR chest 2 view 11/28/2022    Narrative  MRN: [id number]  Patient Name: ANGY QUINONEZ    STUDY:  TH CHEST 2 VIEW PA AND LAT;    INDICATION:  CF  R63.6: Underweight.    COMPARISON:  09/26/2018.    ACCESSION NUMBER(S):  [id number]    ORDERING CLINICIAN:  GABY LOPEZ    FINDINGS:  The cardiac silhouette size is within normal limits.  There is no focal consolidation, edema or pneumothorax.  No sizeable pleural effusion.    Impression  No acute cardiopulmonary process.    CF Sputum Culture     AFB Culture   Date Value Ref Range Status   10/16/2024   Preliminary    Culture in progress and will be examined weekly. A result will be issued either when  positive or after 8 weeks incubation.        Respiratory Culture, Cystic Fibrosis   Date/Time Value Ref Range Status   10/16/2024 03:51 PM (2+) Few Normal throat scarlet  Final   07/08/2024 11:40 AM (2+) Few Normal throat scarlet  Final   07/08/2024 11:40 AM (A)  Final    (1+) Rare Methicillin Susceptible Staphylococcus aureus (MSSA)   03/11/2024 10:16 AM (2+) Few Normal throat scarlet  Final   12/06/2023 03:03 PM (1+) Rare Normal throat scarlet  Final   09/13/2023 12:05 PM 1+ NORMAL THROAT SCARLET. (A)  Final   09/13/2023 12:05 PM Pseudomonas (A)  Final     Comment:     2+~FURTHER IDENTIFIED AS PSEUDOMONAS FULVA       Susceptibility data from last 3000 days.  Collected Specimen Info Organism Amikacin Aztreonam Cefepime Ceftazidime Ciprofloxacin Clindamycin Erythromycin Meropenem Oxacillin Piperacillin/Tazobactam Tetracycline Tobramycin Trimethoprim/Sulfamethoxazole Vancomycin   10/16/24 Fluid from SPUTUM Normal throat scarlet                 07/08/24 Fluid from Throat Swab Methicillin Susceptible Staphylococcus aureus (MSSA)       S  R   S   S   S  S     Normal throat scarlet                 03/11/24 Fluid from Throat Swab Normal throat scarlet                 12/06/23 Fluid from Throat Swab Normal throat scarlet                 09/13/23 Respiratory Pseudomonas S I S S S   S  I  S         Problem List Items Addressed This Visit       Cystic fibrosis - Primary    Current Assessment & Plan     Excellent lung function today (ppFEV1 89%)  Continue Trikafta.  HFP was normal in March 2024.  Not doing aerosols or chest physiotherapy.  Surveillance culture obtained today.  Sick plan: If recent Pseudomonas, ciprofloxacin 750 mg by mouth twice daily x 14 days; otherwise, if MSSA, Bactrim DS, 2 tablets by mouth twice daily x 14 days.         Relevant Orders    AFB Culture/Smear (Completed)    Respiratory Culture, Cystic Fibrosis (Completed)    AFB Processed (Completed)    Cystic fibrosis with gastrointestinal manifestations    Relevant  Medications    calcium citrate-vitamin D3 (Citracal+D) 315 mg-5 mcg (200 unit) tablet    Difficulty maintaining weight    Current Assessment & Plan     Has been gaining weight now that gastrocutaneous fistula is repaired.         Exocrine pancreatic manifestation of cystic fibrosis (Multi)    Relevant Medications    DEKAs Plus, folic acid, 200 mcg-1,000 mcg-10 mg capsule    Flu-like symptoms    Current Assessment & Plan     Filled Tamiflu prescription.         Relevant Medications    oseltamivir (Tamiflu) 75 mg capsule    Healthcare maintenance    Current Assessment & Plan     DEXA scan (11/28/2022): Lumbar spine Z/T score -2.4 (osteopenia)  Influenza vaccination: 10/16/2024; has oseltamivir.  SARS CoV-2 vaccination: 3/26/2021, 2/23/2021  Shingrix: completed series.         Relevant Orders    Flu vaccine, trivalent, preservative free, age 6 months and greater (Fluraix/Fluzone/Flulaval) (Completed)    Melanocytic nevus of trunk    Overview     Shave Biopsy (12/7/2023): MELANOCYTIC NEVUS WITH MILD JUNCTIONAL ARCHITECTURAL DISORDER AND ADJACENT DERMAL MELANOCYTIC NEVUS (SEE COMMENT)         Current Assessment & Plan     Dermatology following annually.  Next appointment on 12/4/2024            Follow-up:    Future Appointments   Date Time Provider Department Center   12/4/2024 10:30 AM Aleena Johnson MD [id number] [facility]   12/9/2024  9:30 AM  RESP PFT TECH [id number] [facility]   12/9/2024  1:30 PM Claudette Pacheco APRN-CNP [id number]   1/9/2025  1:45 PM  RESP PFT TECH [id number] [facility]   1/9/2025  2:00 PM Duke Chatman MD [id number]        Duke Chatman MD   10/16/2024

## 2024-10-16 ENCOUNTER — HOSPITAL ENCOUNTER (OUTPATIENT)
Dept: RESPIRATORY THERAPY | Facility: HOSPITAL | Age: 44
Discharge: HOME | End: 2024-10-16
Payer: MEDICARE

## 2024-10-16 ENCOUNTER — DOCUMENTATION (OUTPATIENT)
Dept: PHYSICAL THERAPY | Facility: HOSPITAL | Age: 44
End: 2024-10-16

## 2024-10-16 ENCOUNTER — APPOINTMENT (OUTPATIENT)
Dept: RADIOLOGY | Facility: HOSPITAL | Age: 44
End: 2024-10-16
Payer: MEDICARE

## 2024-10-16 ENCOUNTER — MULTIDISCIPLINARY VISIT (OUTPATIENT)
Dept: PEDIATRIC PULMONOLOGY | Facility: HOSPITAL | Age: 44
End: 2024-10-16
Payer: MEDICARE

## 2024-10-16 ENCOUNTER — TRANSCRIBE ORDERS (OUTPATIENT)
Dept: RESPIRATORY THERAPY | Facility: HOSPITAL | Age: 44
End: 2024-10-16

## 2024-10-16 VITALS
HEIGHT: 69 IN | HEART RATE: 70 BPM | WEIGHT: 152.78 LBS | TEMPERATURE: 98.5 F | BODY MASS INDEX: 22.63 KG/M2 | OXYGEN SATURATION: 95 % | RESPIRATION RATE: 20 BRPM | SYSTOLIC BLOOD PRESSURE: 98 MMHG | DIASTOLIC BLOOD PRESSURE: 64 MMHG

## 2024-10-16 DIAGNOSIS — E84.9 CF (CYSTIC FIBROSIS) (MULTI): Primary | ICD-10-CM

## 2024-10-16 DIAGNOSIS — D22.5 MELANOCYTIC NEVUS OF TRUNK: ICD-10-CM

## 2024-10-16 DIAGNOSIS — R68.89 FLU-LIKE SYMPTOMS: ICD-10-CM

## 2024-10-16 DIAGNOSIS — E84.19 CYSTIC FIBROSIS WITH GASTROINTESTINAL MANIFESTATIONS: ICD-10-CM

## 2024-10-16 DIAGNOSIS — E84.0 CYSTIC FIBROSIS WITH PULMONARY MANIFESTATIONS (MULTI): ICD-10-CM

## 2024-10-16 DIAGNOSIS — Z00.00 HEALTHCARE MAINTENANCE: ICD-10-CM

## 2024-10-16 DIAGNOSIS — R63.8 DIFFICULTY MAINTAINING WEIGHT: ICD-10-CM

## 2024-10-16 DIAGNOSIS — E84.9 CYSTIC FIBROSIS: Primary | ICD-10-CM

## 2024-10-16 DIAGNOSIS — E84.8 EXOCRINE PANCREATIC MANIFESTATION OF CYSTIC FIBROSIS (MULTI): ICD-10-CM

## 2024-10-16 LAB — HOLD SPECIMEN: NORMAL

## 2024-10-16 PROCEDURE — 94010 BREATHING CAPACITY TEST: CPT

## 2024-10-16 PROCEDURE — 90656 IIV3 VACC NO PRSV 0.5 ML IM: CPT | Performed by: INTERNAL MEDICINE

## 2024-10-16 PROCEDURE — 87116 MYCOBACTERIA CULTURE: CPT | Performed by: INTERNAL MEDICINE

## 2024-10-16 PROCEDURE — 87070 CULTURE OTHR SPECIMN AEROBIC: CPT | Performed by: INTERNAL MEDICINE

## 2024-10-16 PROCEDURE — 99213 OFFICE O/P EST LOW 20 MIN: CPT | Performed by: INTERNAL MEDICINE

## 2024-10-16 RX ORDER — OSELTAMIVIR PHOSPHATE 75 MG/1
75 CAPSULE ORAL EVERY 12 HOURS
Qty: 10 CAPSULE | Refills: 0 | Status: CANCELLED | OUTPATIENT
Start: 2024-10-16 | End: 2024-10-21

## 2024-10-16 ASSESSMENT — PULMONARY FUNCTION TESTS
FEV1 (%PREDICTED): 89
FEV1/FVC: 0.03
FEV1 (LITERS): 3.38
FVC_PERCENT_PREDICTED: 109
FVC (LITERS): 5.08

## 2024-10-17 LAB
MGC ASCENT PFT - FEV1 - PRE: 3.38
MGC ASCENT PFT - FEV1 - PREDICTED: 3.76
MGC ASCENT PFT - FVC - PRE: 5.08
MGC ASCENT PFT - FVC - PREDICTED: 4.63

## 2024-10-18 LAB — MYCOBACTERIUM SPEC CULT: NORMAL

## 2024-10-20 PROBLEM — R68.89 FLU-LIKE SYMPTOMS: Status: ACTIVE | Noted: 2024-10-20

## 2024-10-20 PROBLEM — K94.23: Status: RESOLVED | Noted: 2024-08-01 | Resolved: 2024-10-20

## 2024-10-20 PROBLEM — E84.19 CYSTIC FIBROSIS WITH GASTROINTESTINAL MANIFESTATIONS: Status: ACTIVE | Noted: 2024-10-20

## 2024-10-20 PROBLEM — K31.6 GASTROCUTANEOUS FISTULA DUE TO GASTROSTOMY TUBE: Status: RESOLVED | Noted: 2024-07-14 | Resolved: 2024-10-20

## 2024-10-20 LAB — BACTERIA SPT CF RESP CULT: NORMAL

## 2024-10-20 RX ORDER — OSELTAMIVIR PHOSPHATE 75 MG/1
75 CAPSULE ORAL EVERY 12 HOURS
Qty: 10 CAPSULE | Refills: 0 | Status: SHIPPED | OUTPATIENT
Start: 2024-10-20 | End: 2024-10-25

## 2024-10-20 RX ORDER — PEDI MULTIVIT NO.128/VITAMIN K 500 MCG/ML
2 LIQUID (ML) ORAL DAILY
Qty: 60 CAPSULE | Refills: 0 | Status: SHIPPED | OUTPATIENT
Start: 2024-10-20 | End: 2024-11-19

## 2024-10-20 RX ORDER — BUTALB/ACETAMINOPHEN/CAFFEINE 50-325-40
2 TABLET ORAL DAILY
Qty: 180 TABLET | Refills: 3 | Status: SHIPPED | OUTPATIENT
Start: 2024-10-20 | End: 2025-10-20

## 2024-10-20 NOTE — ASSESSMENT & PLAN NOTE
DEXA scan (11/28/2022): Lumbar spine Z/T score -2.4 (osteopenia)  Influenza vaccination: 10/16/2024; has oseltamivir.  SARS CoV-2 vaccination: 3/26/2021, 2/23/2021  Shingrix: completed series.

## 2024-10-20 NOTE — ASSESSMENT & PLAN NOTE
Excellent lung function today (ppFEV1 89%)  Continue Trikafta.  HFP was normal in March 2024.  Not doing aerosols or chest physiotherapy.  Surveillance culture obtained today.  Sick plan: If recent Pseudomonas, ciprofloxacin 750 mg by mouth twice daily x 14 days; otherwise, if MSSA, Bactrim DS, 2 tablets by mouth twice daily x 14 days.

## 2024-10-23 LAB
ACID FAST STN SPEC: NORMAL
MYCOBACTERIUM SPEC CULT: NORMAL

## 2024-10-30 ENCOUNTER — SPECIALTY PHARMACY (OUTPATIENT)
Dept: PHARMACY | Facility: CLINIC | Age: 44
End: 2024-10-30

## 2024-10-30 LAB
ACID FAST STN SPEC: NORMAL
MYCOBACTERIUM SPEC CULT: NORMAL

## 2024-10-30 PROCEDURE — RXMED WILLOW AMBULATORY MEDICATION CHARGE

## 2024-11-01 ENCOUNTER — PHARMACY VISIT (OUTPATIENT)
Dept: PHARMACY | Facility: CLINIC | Age: 44
End: 2024-11-01
Payer: COMMERCIAL

## 2024-11-06 LAB
ACID FAST STN SPEC: NORMAL
MYCOBACTERIUM SPEC CULT: NORMAL

## 2024-11-13 LAB
ACID FAST STN SPEC: NORMAL
MYCOBACTERIUM SPEC CULT: NORMAL

## 2024-11-19 ENCOUNTER — SPECIALTY PHARMACY (OUTPATIENT)
Dept: PHARMACY | Facility: CLINIC | Age: 44
End: 2024-11-19

## 2024-11-20 LAB
ACID FAST STN SPEC: NORMAL
MYCOBACTERIUM SPEC CULT: NORMAL

## 2024-11-20 PROCEDURE — RXMED WILLOW AMBULATORY MEDICATION CHARGE

## 2024-11-27 LAB
ACID FAST STN SPEC: NORMAL
MYCOBACTERIUM SPEC CULT: NORMAL

## 2024-11-29 ENCOUNTER — PHARMACY VISIT (OUTPATIENT)
Dept: PHARMACY | Facility: CLINIC | Age: 44
End: 2024-11-29
Payer: COMMERCIAL

## 2024-12-04 ENCOUNTER — HOSPITAL ENCOUNTER (OUTPATIENT)
Dept: RADIOLOGY | Facility: HOSPITAL | Age: 44
Discharge: HOME | End: 2024-12-04
Payer: MEDICARE

## 2024-12-04 DIAGNOSIS — R79.89 ABNORMAL LIVER FUNCTION TESTS: ICD-10-CM

## 2024-12-04 LAB
ACID FAST STN SPEC: NORMAL
MYCOBACTERIUM SPEC CULT: NORMAL

## 2024-12-04 PROCEDURE — 76705 ECHO EXAM OF ABDOMEN: CPT

## 2024-12-07 ENCOUNTER — APPOINTMENT (OUTPATIENT)
Dept: RADIOLOGY | Facility: HOSPITAL | Age: 44
End: 2024-12-07
Payer: MEDICARE

## 2024-12-07 ENCOUNTER — HOSPITAL ENCOUNTER (EMERGENCY)
Facility: HOSPITAL | Age: 44
Discharge: HOME | End: 2024-12-07
Attending: EMERGENCY MEDICINE
Payer: MEDICARE

## 2024-12-07 VITALS
SYSTOLIC BLOOD PRESSURE: 118 MMHG | DIASTOLIC BLOOD PRESSURE: 80 MMHG | TEMPERATURE: 97.5 F | HEART RATE: 58 BPM | WEIGHT: 145 LBS | HEIGHT: 69 IN | BODY MASS INDEX: 21.48 KG/M2 | OXYGEN SATURATION: 98 % | RESPIRATION RATE: 16 BRPM

## 2024-12-07 DIAGNOSIS — N20.0 KIDNEY STONE ON LEFT SIDE: Primary | ICD-10-CM

## 2024-12-07 DIAGNOSIS — N13.30 HYDRONEPHROSIS OF LEFT KIDNEY: ICD-10-CM

## 2024-12-07 DIAGNOSIS — R10.9 LEFT FLANK PAIN: ICD-10-CM

## 2024-12-07 DIAGNOSIS — N20.1 URETEROLITHIASIS: ICD-10-CM

## 2024-12-07 DIAGNOSIS — R10.32 LEFT GROIN PAIN: ICD-10-CM

## 2024-12-07 LAB
ALBUMIN SERPL BCP-MCNC: 3.8 G/DL (ref 3.4–5)
ALP SERPL-CCNC: 142 U/L (ref 33–120)
ALT SERPL W P-5'-P-CCNC: 89 U/L (ref 10–52)
ANION GAP SERPL CALC-SCNC: 10 MMOL/L (ref 10–20)
APPEARANCE UR: ABNORMAL
AST SERPL W P-5'-P-CCNC: 52 U/L (ref 9–39)
BASOPHILS # BLD AUTO: 0.08 X10*3/UL (ref 0–0.1)
BASOPHILS NFR BLD AUTO: 1.4 %
BILIRUB SERPL-MCNC: 0.7 MG/DL (ref 0–1.2)
BILIRUB UR STRIP.AUTO-MCNC: NEGATIVE MG/DL
BUN SERPL-MCNC: 21 MG/DL (ref 6–23)
CALCIUM SERPL-MCNC: 8.9 MG/DL (ref 8.6–10.3)
CHLORIDE SERPL-SCNC: 105 MMOL/L (ref 98–107)
CO2 SERPL-SCNC: 28 MMOL/L (ref 21–32)
COLOR UR: ABNORMAL
CREAT SERPL-MCNC: 0.77 MG/DL (ref 0.5–1.3)
EGFRCR SERPLBLD CKD-EPI 2021: >90 ML/MIN/1.73M*2
EOSINOPHIL # BLD AUTO: 0.19 X10*3/UL (ref 0–0.7)
EOSINOPHIL NFR BLD AUTO: 3.2 %
ERYTHROCYTE [DISTWIDTH] IN BLOOD BY AUTOMATED COUNT: 12.9 % (ref 11.5–14.5)
GLUCOSE SERPL-MCNC: 105 MG/DL (ref 74–99)
GLUCOSE UR STRIP.AUTO-MCNC: NORMAL MG/DL
HCT VFR BLD AUTO: 43.4 % (ref 41–52)
HGB BLD-MCNC: 14.4 G/DL (ref 13.5–17.5)
HOLD SPECIMEN: NORMAL
IMM GRANULOCYTES # BLD AUTO: 0.01 X10*3/UL (ref 0–0.7)
IMM GRANULOCYTES NFR BLD AUTO: 0.2 % (ref 0–0.9)
KETONES UR STRIP.AUTO-MCNC: NEGATIVE MG/DL
LEUKOCYTE ESTERASE UR QL STRIP.AUTO: NEGATIVE
LIPASE SERPL-CCNC: <3 U/L (ref 9–82)
LYMPHOCYTES # BLD AUTO: 2.13 X10*3/UL (ref 1.2–4.8)
LYMPHOCYTES NFR BLD AUTO: 36.1 %
MCH RBC QN AUTO: 32.4 PG (ref 26–34)
MCHC RBC AUTO-ENTMCNC: 33.2 G/DL (ref 32–36)
MCV RBC AUTO: 98 FL (ref 80–100)
MONOCYTES # BLD AUTO: 0.49 X10*3/UL (ref 0.1–1)
MONOCYTES NFR BLD AUTO: 8.3 %
MUCOUS THREADS #/AREA URNS AUTO: ABNORMAL /LPF
NEUTROPHILS # BLD AUTO: 3 X10*3/UL (ref 1.2–7.7)
NEUTROPHILS NFR BLD AUTO: 50.8 %
NITRITE UR QL STRIP.AUTO: NEGATIVE
NRBC BLD-RTO: 0 /100 WBCS (ref 0–0)
PH UR STRIP.AUTO: 6.5 [PH]
PLATELET # BLD AUTO: 205 X10*3/UL (ref 150–450)
POTASSIUM SERPL-SCNC: 4.2 MMOL/L (ref 3.5–5.3)
PROT SERPL-MCNC: 6.4 G/DL (ref 6.4–8.2)
PROT UR STRIP.AUTO-MCNC: ABNORMAL MG/DL
RBC # BLD AUTO: 4.45 X10*6/UL (ref 4.5–5.9)
RBC # UR STRIP.AUTO: ABNORMAL /UL
RBC #/AREA URNS AUTO: >20 /HPF
SODIUM SERPL-SCNC: 139 MMOL/L (ref 136–145)
SP GR UR STRIP.AUTO: 1.05
UROBILINOGEN UR STRIP.AUTO-MCNC: NORMAL MG/DL
WBC # BLD AUTO: 5.9 X10*3/UL (ref 4.4–11.3)
WBC #/AREA URNS AUTO: ABNORMAL /HPF

## 2024-12-07 PROCEDURE — 83690 ASSAY OF LIPASE: CPT | Performed by: EMERGENCY MEDICINE

## 2024-12-07 PROCEDURE — 96375 TX/PRO/DX INJ NEW DRUG ADDON: CPT

## 2024-12-07 PROCEDURE — 85025 COMPLETE CBC W/AUTO DIFF WBC: CPT | Performed by: EMERGENCY MEDICINE

## 2024-12-07 PROCEDURE — 2500000001 HC RX 250 WO HCPCS SELF ADMINISTERED DRUGS (ALT 637 FOR MEDICARE OP): Performed by: EMERGENCY MEDICINE

## 2024-12-07 PROCEDURE — 74177 CT ABD & PELVIS W/CONTRAST: CPT | Performed by: RADIOLOGY

## 2024-12-07 PROCEDURE — 99285 EMERGENCY DEPT VISIT HI MDM: CPT | Mod: 25 | Performed by: EMERGENCY MEDICINE

## 2024-12-07 PROCEDURE — 2550000001 HC RX 255 CONTRASTS: Performed by: EMERGENCY MEDICINE

## 2024-12-07 PROCEDURE — 96374 THER/PROPH/DIAG INJ IV PUSH: CPT | Mod: 59

## 2024-12-07 PROCEDURE — 2500000004 HC RX 250 GENERAL PHARMACY W/ HCPCS (ALT 636 FOR OP/ED): Performed by: EMERGENCY MEDICINE

## 2024-12-07 PROCEDURE — 81001 URINALYSIS AUTO W/SCOPE: CPT | Performed by: EMERGENCY MEDICINE

## 2024-12-07 PROCEDURE — 80053 COMPREHEN METABOLIC PANEL: CPT | Performed by: EMERGENCY MEDICINE

## 2024-12-07 PROCEDURE — 36415 COLL VENOUS BLD VENIPUNCTURE: CPT | Performed by: EMERGENCY MEDICINE

## 2024-12-07 PROCEDURE — 74177 CT ABD & PELVIS W/CONTRAST: CPT

## 2024-12-07 RX ORDER — KETOROLAC TROMETHAMINE 10 MG/1
10 TABLET, FILM COATED ORAL EVERY 6 HOURS PRN
Qty: 20 TABLET | Refills: 0 | Status: SHIPPED | OUTPATIENT
Start: 2024-12-07 | End: 2024-12-12

## 2024-12-07 RX ORDER — KETOROLAC TROMETHAMINE 30 MG/ML
15 INJECTION, SOLUTION INTRAMUSCULAR; INTRAVENOUS ONCE
Status: COMPLETED | OUTPATIENT
Start: 2024-12-07 | End: 2024-12-07

## 2024-12-07 RX ORDER — OXYCODONE AND ACETAMINOPHEN 5; 325 MG/1; MG/1
1 TABLET ORAL ONCE
Status: COMPLETED | OUTPATIENT
Start: 2024-12-07 | End: 2024-12-07

## 2024-12-07 RX ORDER — ONDANSETRON 4 MG/1
4 TABLET, ORALLY DISINTEGRATING ORAL EVERY 8 HOURS PRN
Qty: 20 TABLET | Refills: 0 | Status: SHIPPED | OUTPATIENT
Start: 2024-12-07 | End: 2024-12-14

## 2024-12-07 RX ORDER — TAMSULOSIN HYDROCHLORIDE 0.4 MG/1
0.4 CAPSULE ORAL DAILY
Qty: 7 CAPSULE | Refills: 0 | Status: SHIPPED | OUTPATIENT
Start: 2024-12-07 | End: 2024-12-14

## 2024-12-07 RX ORDER — MORPHINE SULFATE 4 MG/ML
4 INJECTION, SOLUTION INTRAMUSCULAR; INTRAVENOUS ONCE
Status: COMPLETED | OUTPATIENT
Start: 2024-12-07 | End: 2024-12-07

## 2024-12-07 RX ORDER — ONDANSETRON HYDROCHLORIDE 2 MG/ML
4 INJECTION, SOLUTION INTRAVENOUS ONCE
Status: COMPLETED | OUTPATIENT
Start: 2024-12-07 | End: 2024-12-07

## 2024-12-07 RX ORDER — OXYCODONE AND ACETAMINOPHEN 5; 325 MG/1; MG/1
1 TABLET ORAL EVERY 6 HOURS PRN
Qty: 5 TABLET | Refills: 0 | Status: SHIPPED | OUTPATIENT
Start: 2024-12-07 | End: 2024-12-10

## 2024-12-07 RX ADMIN — KETOROLAC TROMETHAMINE 15 MG: 30 INJECTION, SOLUTION INTRAMUSCULAR at 08:49

## 2024-12-07 RX ADMIN — ONDANSETRON 4 MG: 2 INJECTION INTRAMUSCULAR; INTRAVENOUS at 11:11

## 2024-12-07 RX ADMIN — MORPHINE SULFATE 4 MG: 4 INJECTION, SOLUTION INTRAMUSCULAR; INTRAVENOUS at 11:11

## 2024-12-07 RX ADMIN — IOHEXOL 67 ML: 350 INJECTION, SOLUTION INTRAVENOUS at 09:55

## 2024-12-07 RX ADMIN — OXYCODONE HYDROCHLORIDE AND ACETAMINOPHEN 1 TABLET: 5; 325 TABLET ORAL at 12:39

## 2024-12-07 ASSESSMENT — PAIN SCALES - GENERAL
PAINLEVEL_OUTOF10: 2
PAINLEVEL_OUTOF10: 7
PAINLEVEL_OUTOF10: 7
PAINLEVEL_OUTOF10: 2
PAINLEVEL_OUTOF10: 5 - MODERATE PAIN
PAINLEVEL_OUTOF10: 7

## 2024-12-07 ASSESSMENT — COLUMBIA-SUICIDE SEVERITY RATING SCALE - C-SSRS
6. HAVE YOU EVER DONE ANYTHING, STARTED TO DO ANYTHING, OR PREPARED TO DO ANYTHING TO END YOUR LIFE?: NO
2. HAVE YOU ACTUALLY HAD ANY THOUGHTS OF KILLING YOURSELF?: NO
1. IN THE PAST MONTH, HAVE YOU WISHED YOU WERE DEAD OR WISHED YOU COULD GO TO SLEEP AND NOT WAKE UP?: NO

## 2024-12-07 ASSESSMENT — PAIN DESCRIPTION - ORIENTATION
ORIENTATION: LEFT
ORIENTATION: LEFT

## 2024-12-07 ASSESSMENT — PAIN DESCRIPTION - LOCATION: LOCATION: OTHER (COMMENT)

## 2024-12-07 ASSESSMENT — PAIN DESCRIPTION - PAIN TYPE
TYPE: ACUTE PAIN
TYPE: ACUTE PAIN

## 2024-12-07 ASSESSMENT — PAIN SCALES - PAIN ASSESSMENT IN ADVANCED DEMENTIA (PAINAD): TOTALSCORE: MEDICATION (SEE MAR)

## 2024-12-07 ASSESSMENT — PAIN - FUNCTIONAL ASSESSMENT
PAIN_FUNCTIONAL_ASSESSMENT: 0-10
PAIN_FUNCTIONAL_ASSESSMENT: 0-10

## 2024-12-07 NOTE — ED PROVIDER NOTES
HPI   Chief Complaint   Patient presents with    Flank Pain    Groin Pain     Patient woke this morning with left sided flank pain. Reports pain wraps around into his left groin and testicle. Denies injury.        43-year-old male with history of cystic fibrosis, prior bowel resections, prior G-tube presents for evaluation of sudden onset left flank pain.  Patient states he woke up at 730 this morning.  He was not in pain when he woke up.  He let his dogs out and started drinking a couple coffee and then developed sudden onset left flank pain.  Describes the pain as 7/10 in severity sharp left flank pain radiating to the left groin and left testicle.  No aggravating or alleviating factors.  No nausea or vomiting.  No fever chills or sweats.  He has not urinated since developing the pain.  He had his last bowel movement last night 12/6 at 6 PM.  He has had prior bowel obstructions, but states that this pain feels different.      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    Gastrostomy site leak (Multi) 08/01/2024    Gastrostomy status (Multi) 12/05/2023    Influenza due to other identified influenza virus with other respiratory manifestations 11/08/2016    Influenza A    Need for zoster vaccination 12/08/2023    Other specified respiratory disorders 10/18/2021    Pseudomonas respiratory infection    Tubular adenoma of colon 06/11/2024     Past Surgical History:   Procedure Laterality Date    GASTROSTOMY TUBE PLACEMENT  08/15/2018    Percutaneous Placement Of Gastrostomy Tube     Family History   Problem Relation Name Age of Onset    Obesity Father's Brother      Hypertension Paternal Grandmother      Heart defect Cousin      Cystic fibrosis Cousin      Cystic fibrosis Cousin      Esophageal cancer Neg Hx      Stomach cancer Neg Hx      Colon cancer Neg Hx      Hyperlipidemia Neg Hx        Social History     Tobacco Use    Smoking status: Never    Smokeless tobacco: Never   Vaping Use    Vaping status: Every Day   Substance Use Topics    Alcohol use: Never    Drug use: Yes     Types: Marijuana     Comment: has medical marijuana- last used today       Physical Exam   ED Triage Vitals [12/07/24 0813]   Temperature Heart Rate Respirations BP   36.4 °C (97.5 °F) 64 16 (!) 140/93      Pulse Ox Temp Source Heart Rate Source Patient Position   97 % Temporal Monitor Sitting      BP Location FiO2 (%)     Left arm --       Physical Exam  Vitals and nursing note reviewed. Exam conducted with a chaperone present.   Constitutional:       General: He is not in acute distress.     Appearance: He is well-developed.   HENT:      Head: Normocephalic and atraumatic.   Eyes:      Conjunctiva/sclera: Conjunctivae normal.   Cardiovascular:      Rate and Rhythm: Normal rate and regular rhythm.      Heart sounds: No murmur heard.  Pulmonary:      Effort: Pulmonary effort is normal. No respiratory distress.      Breath sounds: Normal breath sounds.   Abdominal:      General: A surgical scar is present.      Palpations: Abdomen is soft.      Tenderness: There is no abdominal tenderness. There is left CVA tenderness. There is no right CVA tenderness.      Comments: Multiple healed abdominal surgical incisions.   Genitourinary:     Testes: Normal. Cremasteric reflex is present.         Right: Tenderness not present.         Left: Tenderness not present.      Epididymis:      Right: No tenderness.      Left: No tenderness.      Comments: Male  exam performed with chaperone ALEX Eisenberg at the bedside.  2 testes descended scrotum.  Normal cremasteric reflex bilaterally.  No testicular tenderness bilaterally.  Mild tenderness along the left inguinal canal without palpable hernia.  Musculoskeletal:         General: No swelling.      Cervical back: Neck supple.   Skin:     General: Skin is warm and dry.      Capillary Refill:  Capillary refill takes less than 2 seconds.   Neurological:      General: No focal deficit present.      Mental Status: He is alert and oriented to person, place, and time.      Sensory: No sensory deficit.      Motor: No weakness.   Psychiatric:         Mood and Affect: Mood normal.           ED Course & MDM   ED Course as of 12/07/24 1226   Sat Dec 07, 2024   0834 44-year-old male presents with sudden onset left flank pain radiating to the left groin.  Pain radiates into his left testicle.  He has no testicular tenderness on exam.  His left testicle is not high riding.  Low clinical concern for testicular torsion.  Differential diagnosis includes but not limited to bowel obstruction, diverticulitis, kidney stone, or other causes similar symptoms.    Labs, urinalysis.  Toradol for pain.  CT abdomen pelvis with IV contrast given history of CF with bowel obstructions. [BT]   1011 CT abdomen pelvis w IV contrast  CT preliminary interpretation by me shows 6 mm obstructing left proximal ureteral kidney stone with upstream hydronephrosis. [BT]   1023 Creatinine: 0.77  Creatinine 0.77. [BT]   1024 CT abdomen pelvis w IV contrast  IMPRESSION:  5-6 mm long, 4-5 mm diameter obstructing left UPJ stone (average  attenuation over 1,500 HU, annotated on axial series 2, image 66)  causing delayed left nephrogram and mild left hydronephrosis      No other acute findings      No associated acute findings such as urine leak/urinoma      No separate acute process away from the urinary tract such as acute  diverticulitis other colitis, bowel obstruction, perforation, abscess  or free fluid    Noted [BT]   1054 CBC unremarkable.  Lipase negative. [BT]   1054 Patient did have initial improvement of his left flank pain after Toradol.  Subsequently, the pain has recurred and is back up to a 7 out of 10 in severity.  Morphine and Zofran ordered.  Await results of urinalysis. [BT]   1109 Urinalysis Microscopic(!)  Microscopic hematuria  noted with greater than 20 red cells.  No urinary infection. [BT]   1225 Pain recently well-controlled after morphine.  I considered admission.  Pain is not intractable.  Patient is tolerating oral intake.  He is safe for trial of outpatient follow-up with urology.    I spoke with urology Dr. Montelongo who will see him on 12/9 at 1 PM in his [place] office.    He was given a urine strainer.  Patient prescribed Percocet, Toradol, Flomax, Zofran.  Advised to return anytime with intractable pain vomiting or other concerns.  Patient and family at bedside agreeable with plan and verbalized understanding.  Discharged home. [BT]      ED Course User Index  [BT] Marco Leon DO         Diagnoses as of 12/07/24 1226   Left flank pain   Left groin pain   Kidney stone on left side   Hydronephrosis of left kidney   Ureterolithiasis                 No data recorded     Angelina Coma Scale Score: 15 (12/07/24 0900 : Faina Lemons RN)                           Medical Decision Making      Procedure  Procedures     Marco Leon DO  12/07/24 1226

## 2024-12-09 ENCOUNTER — HOSPITAL ENCOUNTER (OUTPATIENT)
Dept: RADIOLOGY | Facility: CLINIC | Age: 44
Discharge: HOME | End: 2024-12-09
Payer: MEDICARE

## 2024-12-09 ENCOUNTER — OFFICE VISIT (OUTPATIENT)
Dept: UROLOGY | Facility: CLINIC | Age: 44
End: 2024-12-09
Payer: MEDICARE

## 2024-12-09 ENCOUNTER — PREP FOR PROCEDURE (OUTPATIENT)
Dept: UROLOGY | Facility: CLINIC | Age: 44
End: 2024-12-09

## 2024-12-09 ENCOUNTER — APPOINTMENT (OUTPATIENT)
Dept: RESPIRATORY THERAPY | Facility: HOSPITAL | Age: 44
End: 2024-12-09
Payer: MEDICARE

## 2024-12-09 ENCOUNTER — APPOINTMENT (OUTPATIENT)
Dept: DERMATOLOGY | Facility: CLINIC | Age: 44
End: 2024-12-09
Payer: MEDICARE

## 2024-12-09 VITALS
HEART RATE: 66 BPM | DIASTOLIC BLOOD PRESSURE: 78 MMHG | SYSTOLIC BLOOD PRESSURE: 132 MMHG | WEIGHT: 149 LBS | BODY MASS INDEX: 22 KG/M2

## 2024-12-09 DIAGNOSIS — N20.1 CALCULUS OF URETER: ICD-10-CM

## 2024-12-09 DIAGNOSIS — R10.9 FLANK PAIN: ICD-10-CM

## 2024-12-09 DIAGNOSIS — N20.0 KIDNEY STONES: ICD-10-CM

## 2024-12-09 DIAGNOSIS — R35.1 NOCTURIA: ICD-10-CM

## 2024-12-09 PROCEDURE — 3048F LDL-C <100 MG/DL: CPT | Performed by: UROLOGY

## 2024-12-09 PROCEDURE — 3044F HG A1C LEVEL LT 7.0%: CPT | Performed by: UROLOGY

## 2024-12-09 PROCEDURE — 3078F DIAST BP <80 MM HG: CPT | Performed by: UROLOGY

## 2024-12-09 PROCEDURE — 99204 OFFICE O/P NEW MOD 45 MIN: CPT | Performed by: UROLOGY

## 2024-12-09 PROCEDURE — 1036F TOBACCO NON-USER: CPT | Performed by: UROLOGY

## 2024-12-09 PROCEDURE — 74018 RADEX ABDOMEN 1 VIEW: CPT

## 2024-12-09 PROCEDURE — 3075F SYST BP GE 130 - 139MM HG: CPT | Performed by: UROLOGY

## 2024-12-09 ASSESSMENT — ENCOUNTER SYMPTOMS
ENDOCRINE NEGATIVE: 1
PSYCHIATRIC NEGATIVE: 1
NAUSEA: 0
COUGH: 0
ALLERGIC/IMMUNOLOGIC NEGATIVE: 1
EYES NEGATIVE: 1
CHILLS: 0
FEVER: 0
DIFFICULTY URINATING: 0
SHORTNESS OF BREATH: 0

## 2024-12-09 NOTE — H&P (VIEW-ONLY)
Subjective   Patient ID: Froy Parisi is a 44 y.o. male.    HPI  Patient is here to establish for  ER follow up. He was seen in ER on Saturday for left flank pain. CT done showed 5-6 mm long, 4-5mm diameter obstructing left UPJ stone causing delayed left nephrogram and mild left hydronephrosis. He has mild pain today. He is taking Toradol for pain. No kidney stone hx. He does have hx of cystic Fibrosis. Chronic BPH sx are mild and stable. Denies urgency and frequency. Denies dysuria. Denies hematuria. Nocturia x1. No medication for LUT'S.       Review of Systems   Constitutional:  Negative for chills and fever.   HENT: Negative.     Eyes: Negative.    Respiratory:  Negative for cough and shortness of breath.    Cardiovascular:  Negative for chest pain and leg swelling.   Gastrointestinal:  Negative for nausea.   Endocrine: Negative.    Genitourinary:  Negative for difficulty urinating.        Negative except for documented in HPI   Allergic/Immunologic: Negative.    Neurological:         Alert & oriented X 3   Hematological:         Denies blood thinners   Psychiatric/Behavioral: Negative.         Objective   Physical Exam  Vitals and nursing note reviewed.   Constitutional:       General: He is not in acute distress.     Appearance: Normal appearance.   Pulmonary:      Effort: Pulmonary effort is normal.   Abdominal:      Tenderness: There is no abdominal tenderness.   Genitourinary:     Comments: Kidneys non palpable bilaterally  Bladder non palpable or tender  Scrotum no mass, No hydrocele. Vas not palpable  Epididymis- No spermatocele. Non Tender.  Testicles: No mass. WNL  Urethra: No discharge  Penis within normal limits... No lesions. circumcised  Prostate - deferred  Neurological:      Mental Status: He is alert.         Assessment/Plan       Diagnoses and all orders for this visit:  Kidney stones  Flank pain    All available PSA values reviewed, Options discussed. Questions answered.  Initial PSA  ordered   Diet changes for prostate health discussed and educational information given. Pros/Cons of prostate health supplements discussed.   Treatment options for LUTS reviewed-not typically an issue  Discussed timed voiding. Discussed fluid and caffeine intake  Treatment options for ED reviewed-not an issue  Lifestyle change to help prevent UTIs discussed. Encouraged fluid intake.  CT reviewed  KUB ordered  Stone prevention discussed. Diet reviewed. Discussed fluid intake      F/U Left ureteroscopy with possible laser and stent

## 2024-12-10 ENCOUNTER — ANESTHESIA (OUTPATIENT)
Dept: OPERATING ROOM | Facility: HOSPITAL | Age: 44
End: 2024-12-10
Payer: MEDICARE

## 2024-12-10 ENCOUNTER — APPOINTMENT (OUTPATIENT)
Dept: RADIOLOGY | Facility: HOSPITAL | Age: 44
End: 2024-12-10
Payer: MEDICARE

## 2024-12-10 ENCOUNTER — ANESTHESIA EVENT (OUTPATIENT)
Dept: OPERATING ROOM | Facility: HOSPITAL | Age: 44
End: 2024-12-10
Payer: MEDICARE

## 2024-12-10 ENCOUNTER — HOSPITAL ENCOUNTER (OUTPATIENT)
Facility: HOSPITAL | Age: 44
Setting detail: OUTPATIENT SURGERY
Discharge: HOME | End: 2024-12-10
Attending: UROLOGY | Admitting: UROLOGY
Payer: MEDICARE

## 2024-12-10 VITALS
RESPIRATION RATE: 16 BRPM | OXYGEN SATURATION: 96 % | HEART RATE: 75 BPM | SYSTOLIC BLOOD PRESSURE: 132 MMHG | TEMPERATURE: 98.6 F | DIASTOLIC BLOOD PRESSURE: 89 MMHG | WEIGHT: 146.16 LBS | BODY MASS INDEX: 21.58 KG/M2

## 2024-12-10 DIAGNOSIS — N20.1 CALCULUS OF URETER: Primary | ICD-10-CM

## 2024-12-10 PROCEDURE — 3700000001 HC GENERAL ANESTHESIA TIME - INITIAL BASE CHARGE: Performed by: UROLOGY

## 2024-12-10 PROCEDURE — 52352 CYSTOURETERO W/STONE REMOVE: CPT | Performed by: UROLOGY

## 2024-12-10 PROCEDURE — 52332 CYSTOSCOPY AND TREATMENT: CPT | Performed by: UROLOGY

## 2024-12-10 PROCEDURE — 7100000001 HC RECOVERY ROOM TIME - INITIAL BASE CHARGE: Performed by: UROLOGY

## 2024-12-10 PROCEDURE — C2617 STENT, NON-COR, TEM W/O DEL: HCPCS | Performed by: UROLOGY

## 2024-12-10 PROCEDURE — 2550000001 HC RX 255 CONTRASTS: Performed by: UROLOGY

## 2024-12-10 PROCEDURE — 7100000010 HC PHASE TWO TIME - EACH INCREMENTAL 1 MINUTE: Performed by: UROLOGY

## 2024-12-10 PROCEDURE — 7100000002 HC RECOVERY ROOM TIME - EACH INCREMENTAL 1 MINUTE: Performed by: UROLOGY

## 2024-12-10 PROCEDURE — 7100000009 HC PHASE TWO TIME - INITIAL BASE CHARGE: Performed by: UROLOGY

## 2024-12-10 PROCEDURE — C1769 GUIDE WIRE: HCPCS | Performed by: UROLOGY

## 2024-12-10 PROCEDURE — 2500000004 HC RX 250 GENERAL PHARMACY W/ HCPCS (ALT 636 FOR OP/ED): Performed by: ANESTHESIOLOGY

## 2024-12-10 PROCEDURE — 2500000004 HC RX 250 GENERAL PHARMACY W/ HCPCS (ALT 636 FOR OP/ED): Performed by: UROLOGY

## 2024-12-10 PROCEDURE — 3700000002 HC GENERAL ANESTHESIA TIME - EACH INCREMENTAL 1 MINUTE: Performed by: UROLOGY

## 2024-12-10 PROCEDURE — 2720000007 HC OR 272 NO HCPCS: Performed by: UROLOGY

## 2024-12-10 PROCEDURE — 74420 UROGRAPHY RTRGR +-KUB: CPT | Performed by: UROLOGY

## 2024-12-10 PROCEDURE — 2500000002 HC RX 250 W HCPCS SELF ADMINISTERED DRUGS (ALT 637 FOR MEDICARE OP, ALT 636 FOR OP/ED): Performed by: UROLOGY

## 2024-12-10 PROCEDURE — 3600000008 HC OR TIME - EACH INCREMENTAL 1 MINUTE - PROCEDURE LEVEL THREE: Performed by: UROLOGY

## 2024-12-10 PROCEDURE — 3600000003 HC OR TIME - INITIAL BASE CHARGE - PROCEDURE LEVEL THREE: Performed by: UROLOGY

## 2024-12-10 DEVICE — URETERAL STENT
Type: IMPLANTABLE DEVICE | Site: URETER | Status: FUNCTIONAL
Brand: POLARIS™ ULTRA

## 2024-12-10 RX ORDER — FENTANYL CITRATE 50 UG/ML
INJECTION, SOLUTION INTRAMUSCULAR; INTRAVENOUS AS NEEDED
Status: DISCONTINUED | OUTPATIENT
Start: 2024-12-10 | End: 2024-12-10

## 2024-12-10 RX ORDER — PROPOFOL 10 MG/ML
INJECTION, EMULSION INTRAVENOUS AS NEEDED
Status: DISCONTINUED | OUTPATIENT
Start: 2024-12-10 | End: 2024-12-10

## 2024-12-10 RX ORDER — HYDROCODONE BITARTRATE AND ACETAMINOPHEN 5; 325 MG/1; MG/1
1 TABLET ORAL EVERY 6 HOURS PRN
Qty: 20 TABLET | Refills: 0 | Status: SHIPPED | OUTPATIENT
Start: 2024-12-10

## 2024-12-10 RX ORDER — PHENAZOPYRIDINE HYDROCHLORIDE 200 MG/1
200 TABLET, FILM COATED ORAL ONCE
Status: COMPLETED | OUTPATIENT
Start: 2024-12-10 | End: 2024-12-10

## 2024-12-10 RX ORDER — CEFTRIAXONE 1 G/50ML
1 INJECTION, SOLUTION INTRAVENOUS ONCE
Status: COMPLETED | OUTPATIENT
Start: 2024-12-10 | End: 2024-12-10

## 2024-12-10 RX ORDER — PHENAZOPYRIDINE HYDROCHLORIDE 200 MG/1
200 TABLET, FILM COATED ORAL 3 TIMES DAILY PRN
Qty: 30 TABLET | Refills: 0 | Status: SHIPPED | OUTPATIENT
Start: 2024-12-10

## 2024-12-10 RX ORDER — CIPROFLOXACIN 500 MG/1
250 TABLET ORAL 2 TIMES DAILY
Qty: 3 TABLET | Refills: 0 | Status: SHIPPED | OUTPATIENT
Start: 2024-12-10 | End: 2024-12-13

## 2024-12-10 ASSESSMENT — PAIN - FUNCTIONAL ASSESSMENT
PAIN_FUNCTIONAL_ASSESSMENT: 0-10

## 2024-12-10 ASSESSMENT — PAIN SCALES - GENERAL
PAINLEVEL_OUTOF10: 0 - NO PAIN
PAINLEVEL_OUTOF10: 3
PAINLEVEL_OUTOF10: 0 - NO PAIN

## 2024-12-10 ASSESSMENT — COLUMBIA-SUICIDE SEVERITY RATING SCALE - C-SSRS
6. HAVE YOU EVER DONE ANYTHING, STARTED TO DO ANYTHING, OR PREPARED TO DO ANYTHING TO END YOUR LIFE?: NO
1. IN THE PAST MONTH, HAVE YOU WISHED YOU WERE DEAD OR WISHED YOU COULD GO TO SLEEP AND NOT WAKE UP?: NO
2. HAVE YOU ACTUALLY HAD ANY THOUGHTS OF KILLING YOURSELF?: NO

## 2024-12-10 NOTE — ANESTHESIA PROCEDURE NOTES
Airway  Date/Time: 12/10/2024 2:58 PM  Urgency: elective      Staffing  Performed: CHANELL   Authorized by: Froy Montgomery MD    Performed by: Froy Montgomery MD  Patient location during procedure: OR    Indications and Patient Condition  Indications for airway management: anesthesia      Final Airway Details  Final airway type: supraglottic airway      Successful airway: Size 5

## 2024-12-10 NOTE — OP NOTE
Cystoscopy with STENT INSERTION (L), Cystoscopy with Retrograde Pyelogram (L) Operative Note     Date: 12/10/2024  OR Location: [facility] OR    Name: Froy Parisi, : 1980, Age: 44 y.o., MRN: [id number], Sex: male    Diagnosis  Pre-op Diagnosis      * Calculus of ureter [N20.1] Post-op Diagnosis     * Calculus of ureter [N20.1]     Procedures  Cystoscopy with STENT INSERTION  60423 - IN CYSTO/URETERO W/LITHOTRIPSY &INDWELL STENT INSRT    Cystoscopy with Retrograde Pyelogram  07936 - CHG UROGRAPHY RETROGRADE WITH/WO KUB      Surgeons      * Andre Montelongo - Primary    Resident/Fellow/Other Assistant:  Surgeons and Role:  * No surgeons found with a matching role *    Staff:   Circulator: Katy Geiger Person: Tato  Circulator: Jessi    Anesthesia Staff: Anesthesiologist: Froy Montogmery MD    Procedure Summary  Anesthesia: Anesthesia type not filed in the log.  ASA: III  Estimated Blood Loss: 0mL  Intra-op Medications: Administrations occurring from 1330 to 1400 on 12/10/24:  * No intraprocedure medications in log *           Anesthesia Record               Intraprocedure I/O Totals       None           Specimen: No specimens collected              Drains and/or Catheters: * None in log *    Tourniquet Times:         Implants:  Implants       Type Name Action Serial No.      Shunt STENT, POLARIS ULTRA  5FR 24CM, DISPOSABLE - [id number] Implanted                   Indications: Froy Parisi is an 44 y.o. male who is having surgery for Calculus of ureter [N20.1].     The patient was seen in the preoperative area. The risks, benefits, complications, treatment options, non-operative alternatives, expected recovery and outcomes were discussed with the patient. The possibilities of reaction to medication, pulmonary aspiration, injury to surrounding structures, bleeding, recurrent infection, the need for additional procedures, failure to diagnose a condition, and creating a complication requiring transfusion or  operation were discussed with the patient. The patient concurred with the proposed plan, giving informed consent.  The site of surgery was properly noted/marked if necessary per policy. The patient has been actively warmed in preoperative area.   Preoperative diagnosis: Left ureteral stone  Postoperative diagnosis: Same  Procedure: cystoscopy with Left RPG and ureteroscopy, Stone manipulation and Left stent insertion  Physician: JAKE  Anesthesia: Gen.  Estimated blood loss: None  Indications and consent: The patient presents for treatment of a left ureteral stone. After the risks, benefits, alternatives, and indications of the procedure were explained to the patient they consented.  Procedure: The patient was brought to the operating room and placed on the table in the supine position. After adequate anesthesia was obtained, the patient was prepped and draped in the standard surgical fashion. First, the cystoscope was inserted into the bladder. Formal cystoscopy was performed.  A Left retrograde pyelogram was performed which suggested a filling defect in the ureter We then performed ureteroscopy however the ureteroscope was unable to reach the stone. The stone was manipulated and pushed proximally using the 5 [place] open-ended catheter which allowed us to place the guidewire past the stone into the renal pelvis. A 5 x 24 stent was placed over the existing guidewire. This was done under fluoroscopic vision. Proper positioning was confirmed.The patient tolerated the procedure well and there no complications.     F/U with KUB, plan ESWL    Attending Attestation: I was present and scrubbed for the entire procedure.    Andre Montelongo  Phone Number: [phone]

## 2024-12-10 NOTE — ANESTHESIA POSTPROCEDURE EVALUATION
Patient: Froy Parisi    Procedure Summary       Date: 12/10/24 Room / Location: [facility] OR 01 / Virtual [facility] OR    Anesthesia Start: 1457 Anesthesia Stop: 1511    Procedures:       Cystoscopy with STENT INSERTION (Left)      Cystoscopy with Retrograde Pyelogram (Left) Diagnosis:       Calculus of ureter      (Calculus of ureter [N20.1])    Surgeons: Andre Montelongo MD Responsible Provider: Froy Montgomery MD    Anesthesia Type: general ASA Status: 3            Anesthesia Type: general    Vitals Value Taken Time   /81 12/10/24 1531   Temp 37.6 °C (99.6 °F) 12/10/24 1511   Pulse 73 12/10/24 1535   Resp 16 12/10/24 1535   SpO2 95 % 12/10/24 1535   Vitals shown include unfiled device data.    Anesthesia Post Evaluation    Patient location during evaluation: bedside  Patient participation: complete - patient participated  Level of consciousness: sleepy but conscious  Pain management: adequate  Airway patency: patent  Cardiovascular status: acceptable  Respiratory status: acceptable  Hydration status: acceptable  Postoperative Nausea and Vomiting: none    No notable events documented.

## 2024-12-10 NOTE — ANESTHESIA PREPROCEDURE EVALUATION
Patient: Froy Parisi    Procedure Information       Date/Time: 12/10/24 1330    Procedures:       Cystoscopy with Lithotripsy Laser (Left)      Cystoscopy with Retrograde Pyelogram (Left)    Location: [facility] OR 01 / Virtual [facility] OR    Surgeons: Andre Montelongo MD            Relevant Problems   Anesthesia (within normal limits)      Cardiac   (+) Pleurodynia      Pulmonary   (+) Chronic obstructive pulmonary disease (Multi)   (+) Pneumonia, unspecified organism      GI   (+) GERD (gastroesophageal reflux disease)      Hematology   (+) Bleeding diathesis due to vitamin K deficiency (Multi)      HEENT   (+) Sensorineural hearing loss (SNHL) of left ear with restricted hearing of right ear      ID   (+) Pneumonia, unspecified organism   (+) Pseudomonas aeruginosa infection       Clinical information reviewed:   Tobacco  Allergies  Meds   Med Hx  Surg Hx   Fam Hx          NPO Detail:  NPO/Void Status  Carbohydrate Drink Given Prior to Surgery? : N  Date of Last Liquid: 12/10/24  Time of Last Liquid: 1000  Date of Last Solid: 12/09/24  Time of Last Solid: 1800  Last Intake Type: Clear fluids  Time of Last Void: 1000         Physical Exam    Airway  Mallampati: II  TM distance: >3 FB  Neck ROM: full     Cardiovascular   Rhythm: regular  Rate: normal     Dental    Pulmonary    Abdominal            Anesthesia Plan    History of general anesthesia?: yes  History of complications of general anesthesia?: no    ASA 3     general     intravenous and inhalational induction   Anesthetic plan and risks discussed with patient.

## 2024-12-17 ASSESSMENT — ENCOUNTER SYMPTOMS
ALLERGIC/IMMUNOLOGIC NEGATIVE: 1
EYES NEGATIVE: 1
FEVER: 0
SHORTNESS OF BREATH: 0
PSYCHIATRIC NEGATIVE: 1
NAUSEA: 0
COUGH: 0
DIFFICULTY URINATING: 0
ENDOCRINE NEGATIVE: 1
CHILLS: 0

## 2024-12-17 NOTE — PROGRESS NOTES
Subjective   Patient ID: Froy Parisi is a 44 y.o. male.    HPI  Patient is here for kub results. S/P Left ureteroscopy with left stent on 12/10/24. He does have hx of cystic Fibrosis. Chronic BPH sx are mild and stable. Denies urgency and frequency. Denies dysuria. Denies hematuria. Nocturia x1. No medication for LUT'S.       Review of Systems   Constitutional:  Negative for chills and fever.   HENT: Negative.     Eyes: Negative.    Respiratory:  Negative for cough and shortness of breath.    Cardiovascular:  Negative for chest pain and leg swelling.   Gastrointestinal:  Negative for nausea.   Endocrine: Negative.    Genitourinary:  Negative for difficulty urinating.        Negative except for documented in HPI   Allergic/Immunologic: Negative.    Neurological:         Alert & oriented X 3   Hematological:         Denies blood thinners   Psychiatric/Behavioral: Negative.         Objective   Physical Exam  Vitals and nursing note reviewed.   Pulmonary:      Effort: Pulmonary effort is normal.   Abdominal:      Palpations: Abdomen is soft.      Tenderness: There is no abdominal tenderness.   Genitourinary:     Comments: Kidneys non palpable bilaterally  Bladder non palpable or tender  Neurological:      Mental Status: He is alert.         Assessment/Plan   Diagnoses and all orders for this visit:  Kidney stones  Flank pain  Nocturia       Treatment options for LUTS reviewed  Discussed timed voiding. Discussed fluid and caffeine intake  Lifestyle change to help prevent UTIs discussed. Encouraged fluid intake.  KUB reviewed  Will plan LEFT ESWL and stent removal  Stone prevention discussed. Diet reviewed. Discussed fluid intake    F/U LEFT ESWL and stent removal

## 2024-12-18 ENCOUNTER — OFFICE VISIT (OUTPATIENT)
Dept: UROLOGY | Facility: CLINIC | Age: 44
End: 2024-12-18
Payer: MEDICARE

## 2024-12-18 ENCOUNTER — HOSPITAL ENCOUNTER (OUTPATIENT)
Dept: RADIOLOGY | Facility: CLINIC | Age: 44
Discharge: HOME | End: 2024-12-18
Payer: MEDICARE

## 2024-12-18 VITALS
BODY MASS INDEX: 22.45 KG/M2 | SYSTOLIC BLOOD PRESSURE: 132 MMHG | HEART RATE: 72 BPM | RESPIRATION RATE: 16 BRPM | DIASTOLIC BLOOD PRESSURE: 73 MMHG | WEIGHT: 152 LBS

## 2024-12-18 DIAGNOSIS — N20.0 KIDNEY STONES: ICD-10-CM

## 2024-12-18 DIAGNOSIS — R35.1 NOCTURIA: ICD-10-CM

## 2024-12-18 DIAGNOSIS — R10.9 FLANK PAIN: ICD-10-CM

## 2024-12-18 PROCEDURE — 74018 RADEX ABDOMEN 1 VIEW: CPT

## 2024-12-23 ENCOUNTER — SPECIALTY PHARMACY (OUTPATIENT)
Dept: PHARMACY | Facility: CLINIC | Age: 44
End: 2024-12-23

## 2024-12-23 PROCEDURE — RXMED WILLOW AMBULATORY MEDICATION CHARGE

## 2024-12-31 ENCOUNTER — PHARMACY VISIT (OUTPATIENT)
Dept: PHARMACY | Facility: CLINIC | Age: 44
End: 2024-12-31
Payer: COMMERCIAL

## 2025-01-03 ENCOUNTER — PREP FOR PROCEDURE (OUTPATIENT)
Dept: UROLOGY | Facility: CLINIC | Age: 45
End: 2025-01-03
Payer: MEDICARE

## 2025-01-03 DIAGNOSIS — N20.0 KIDNEY STONE: ICD-10-CM

## 2025-01-03 PROBLEM — J18.9 PNEUMONIA, UNSPECIFIED ORGANISM: Status: RESOLVED | Noted: 2017-09-05 | Resolved: 2025-01-03

## 2025-01-03 NOTE — PROGRESS NOTES
Tristin Sutherland M.D. 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 Pre-Trikafta ppFEV1 in mid 60% decile. Post Trikafta ppFEV1 in the mid 80% decile. Last hospitalization and in August 2018. Historically, difficulty maintaining adequate BMI, had feeding tube placed in his 20's, removed on 6/21/2024. Pre-Trikafta BMI 17-19 kg/m². Post Trikafta BMI stable at around 22.5 kg/m². Last Pseudomonas aeruginosa (November 2016). Last MRSA (March 2016). Occasional Aspergillus fumigatus.     CF complications: EPI on PERT; GERD controlled with omeprazole; suspicion for CF liver disease (Dr. Johnson, March 2023), wanted to get repeat RUQ ultrasound with duplex; osteopenia on DEXA in September 2022; (+) 2-hour OGTT on 3/27/2019 (baseline 97 mg/dL, 2-hour 219) but endocrinology considers him to have impaired glucose tolerance.    HPI (1/9/2025):  ED visit on 12/7/2024 with left-sided flank and groin pain; CT scan showed 5-6 mm long, 4-5 mm diameter obstructing left UPJ stone with mild hydronephrosis; discharged with oral ketorolac and followed up in clinic with Dr. Montelongo from urology on 12/9/2024; then had interval cystoscopy with STENT INSERTION (LEFT), cystoscopy with retrograde pyelogram (LEFT) on 12/10/2024 by Dr. Montelongo; scheduled for left-sided lithotripsy and stent removal on 1/7/2025.   Dr. Montelongo prescribed ciprofloxacin empirically for UTI prophylaxis, but he did not start taking.  On 12/30/2024, went to [place] ED ([facility]).  Note reviewed in Care Everywhere.  Presented with cough, chest congestion, fever.  Chest x-ray interpreted as showing possible left-sided pneumonia.  Negative viral PCR testing.  Afebrile in ED.  Normal WBC.  Urinalysis (+) for protein, ketones, blood, and leukocyte esterase.  Prescribed Augmentin for 10 days and a Medrol Dose-Stefan.   After anesthesia on 1/7/2025, had nausea and vomiting and  headache.  Limited symptoms.  CT head negative.    Drinking two Boost supplements per day.  Feels good.  No fevers.  Stent removal was uneventful.      HPI (10/16/2024): Here with Gauri; Gastrocutaneous fistula repair with Dr. Dunlap on 8/19/2024.  Has gained some weight because he now does not need to avoid intake due to precipitating leakage from the fistula.  Has done well in the interim with only a single episode on 9/5/2024 of acute sinuitis treated with Augmentin 875-125 mg twice daily x 10 days, ending 9/15/2024.  Today, he feels like he recovered fully from that episode.    HPI (7/8/2024): PEG tube removed on 6/21/2024.  Has not been able to eat or drink to satiety due to leakage from around the PEG tube site.  Appetite intact, but weight down.  Denies steatorrhea.  Feeling good from lung perspective.      HPI (3/11/2024):  In-person visit today; no interval respiratory illnesses; still has some leaking around [place] button but much less than he had with the mini-profile; not doing tube feeding but taking two Boost supplements per day. Resumed taking azithromycin daily after (+) throat swab culture for Pseudomonas fulva in September 2023. Not coughing. Does not expectorate sputum. No wheezing. He is not exercising regularly, but attributes to time limitation. If he misses omeprazole, notices that gastric juice that leaks around PEG tube burns. Interested in research.     HPI (12/6/2023):  Not doing tube feeding.  Tube leaks, would like to get a new button.  Trying to maintain and gain weight using oral supplements but problems obtaining them. No interval need for antibiotics.  Feels like his respiratory status is good. Not doing aerosols or chest physiotherapy.  Generally not coughing or expectorating sputum.  Denies any side effects associated with Trikafta.  Went to dermatology and had shave biopsy of pigmented lesion of lower back on left.    HPI (9/13/2023): First visit with Froy. Heretofore followed  with Dr. Bravo, last visit on 6/12/2023. At that point, standard visit. No exacerbation. Doing tube feeding occasionally with Relizorb. Otherwise standard CF health measures. Since that time, he has done well without need for oral antibiotics. His girlfriend (Gauri) has a grandson in [place] with retinoblastoma, so they have been back and forth over past few months to support Gauri's son and daughter-in-law. Froy wonders whether he can/should have the VZV vaccine. He has also had a spot on skin of back bleed after scratching. Wants MD to take a look.     Current Use of Health Management Strategies:    Respiratory Interventions  Albuterol: Does not use  Pulmozyme: Does not use  Hypertonic saline: Does not use    HFCWO (percussive vest): Does not use  Oscillatory PEP: Does not use  Exercise: Weightlifting 2-3 times per week  LTE antagonist: No  Azithromycin: Does not use  Aztreonam lysine ([place]): Does not use  Tobramycin: Does not use    Colistin: Does not use  Meropenem (inhaled): Does not use  Vancomycin (inhaled): Does not use  ICS: Does not use  ICS/LABA: Does not use  LAMA: No  CFTR modulator: Elexacaftor-Tezacaftor-Ivacaftor (Trikafta) Full-dose (2 orange tablets in AM, 1 blue tablet in PM)  High-dose ibuprofen: No   Oxygen: Does not use  Chronic oral antibiotics: Does not use     Digestive Health Interventions    Acid-suppressing medication?: Yes - omeprazole 40 mg daily  Using CF vitamins?: Yes  Taking pancreatic enzymes?: Yes - Zenpep 20, 4- 5 capsules with meals, 2-3 with snacks  Any diarrhea?: No  Any steatorrhea?: No  Any constipation?: No  Using laxatives?: No  Feeding tube?: Yes  Supplemental enteral nutrition?: Yes     Pulmonary Function Test Results    PFT (1/9/2025): FEV1 3.30 L (83%), FVC 4.84 L (97%), FEV1/FVC 68; mild airflow obstruction  PFT (10/16/2024): FEV1 3.38 L (89%), FVC 5.08 L (109%), FEV1/FVC 67; mild airflow obstruction  PFT (7/8/2024): FEV1 3.64 L (91%), FVC 4.89 L (97%),  FEV1/FVC 74; normal spirometry  PFT (3/11/2024): FEV1 3.42 L (85%), FVC 4.89 L (98%), FEV1/FVC 70; normal spirometry  PFT (12/6/2023): FEV1 3.53 L (88%), FVC 4.81 L (96%), FEV1/FVC 73; minimal airflow obstruction  PFT (9/13/2023): FEV1 3.38 L (84%), FVC 4.85 L (97%), FEV1/FVC 70; minimal airflow obstruction.  PFT (6/12/2023): FEV1 3.43 L (85%), FVC 4.79 L (96%), FEV1/FVC 72; normal spirometry  PFT (9/12/2022): FEV1 3.79 L (94%), FVC 4.92 L (98%), FEV1/FVC 77; normal spirometry  PFT (9/11/2018): FEV1 3.06 L (73%), FVC 4.59 L (88%), FEV1/FVC 67; mild airflow obstruction    Current Medications     Current Outpatient Medications   Medication Instructions    blood sugar diagnostic (OneTouch Verio test strips) strip Test blood sugar fasting, 2 hours after your largest meal, and as needed    calcium citrate-vitamin D3 (Citracal+D) 315 mg-5 mcg (200 unit) tablet 2 tablets, oral, Daily    elexacaftor-tezacaftor-ivacaft (Trikafta) 100-50-75 mg tablet TAKE TWO (2) ORANGE TABLETS BY MOUTH IN THE MORNING AND ONE (1) BLUE TABLET BY MOUTH IN THE EVENING. TAKE 12 HOURS APART WITH FATTY FOODS.    HYDROcodone-acetaminophen (Norco) 5-325 mg tablet 1 tablet, oral, Every 6 hours PRN    lipase-protease-amylase (Zenpep) 20,000-63,000- 84,000 unit capsule Take 6 capsules by mouth 3 times a day with meals. May also take 3 capsules with snacks for snacks.    omeprazole (PRILOSEC) 40 mg, oral, Daily before breakfast    OneTouch Delica Plus Lancet 33 gauge misc     phenazopyridine (PYRIDIUM) 200 mg, oral, 3 times daily PRN       Physical Examination     /59 (BP Location: Right arm, Patient Position: Sitting, BP Cuff Size: Adult)   Pulse 53   Temp 36.6 °C (97.9 °F) (Oral)   Resp 16   SpO2 95%     Daily Weight  01/07/25 : 65.8 kg (145 lb)  01/07/25 : 67.2 kg (148 lb 2.4 oz)  12/18/24 : 68.9 kg (152 lb)  12/10/24 : 66.3 kg (146 lb 2.6 oz)  12/09/24 : 67.6 kg (149 lb)  12/07/24 : 65.8 kg (145 lb)  10/16/24 : 69.3 kg (152 lb 12.5  oz)  09/05/24 : 68 kg (150 lb)     General: Pleasant man in no distress. Well-appearing.  Here with his wife, Gauri, and children.  HEENT: Good dentition. Unremarkable nasal mucosa.  Neck: Supple, no lymphadenopathy or thyromegaly  Chest: Clear to auscultation bilaterally.   Cardiac: Regular rhythm, no murmur  Abdomen: Soft, non-tender, non-distended, well-healed transverse incision from gastrocutaneous fistula repair.  Extremities: (+) Mild digital clubbing, no cyanosis or edema  Skin: Multiple nevi.    Metabolic Parameters     Sodium   Date/Time Value Ref Range Status   12/07/2024 08:36  136 - 145 mmol/L Final     Potassium   Date/Time Value Ref Range Status   12/07/2024 08:36 AM 4.2 3.5 - 5.3 mmol/L Final     Chloride   Date/Time Value Ref Range Status   12/07/2024 08:36  98 - 107 mmol/L Final     Bicarbonate   Date/Time Value Ref Range Status   12/07/2024 08:36 AM 28 21 - 32 mmol/L Final     Anion Gap   Date/Time Value Ref Range Status   12/07/2024 08:36 AM 10 10 - 20 mmol/L Final     Urea Nitrogen   Date/Time Value Ref Range Status   12/07/2024 08:36 AM 21 6 - 23 mg/dL Final     Creatinine   Date/Time Value Ref Range Status   12/07/2024 08:36 AM 0.77 0.50 - 1.30 mg/dL Final     GFR MALE   Date/Time Value Ref Range Status   03/13/2023 12:35 PM >90 >90 mL/min/1.73m2 Final     Comment:      CALCULATIONS OF ESTIMATED GFR ARE PERFORMED   USING THE 2021 CKD-EPI STUDY REFIT EQUATION   WITHOUT THE RACE VARIABLE FOR THE IDMS-TRACEABLE   CREATININE METHODS.    https://jasn.asnjournals.org/content/early/2021/09/22/ASN.[email or web address]       Glucose   Date/Time Value Ref Range Status   12/07/2024 08:36  (H) 74 - 99 mg/dL Final     Calcium   Date/Time Value Ref Range Status   12/07/2024 08:36 AM 8.9 8.6 - 10.3 mg/dL Final     Phosphorus   Date/Time Value Ref Range Status   03/11/2024 09:51 AM 3.3 2.5 - 4.9 mg/dL Final     Comment:     The performance characteristics of phosphorus testing in heparinized plasma  have been validated by the individual  laboratory site where testing is performed. Testing on heparinized plasma is not approved by the FDA; however, such approval is not necessary.     Cholesterol   Date/Time Value Ref Range Status   03/11/2024 09:51  0 - 199 mg/dL Final     Comment:           Age      Desirable   Borderline High   High     0-19 Y     0 - 169       170 - 199     >/= 200    20-24 Y     0 - 189       190 - 224     >/= 225         >24 Y     0 - 199       200 - 239     >/= 240   **All ranges are based on fasting samples. Specific   therapeutic targets will vary based on patient-specific   cardiac risk.    Pediatric guidelines reference:Pediatrics 2011, 128(S5).Adult guidelines reference: NCEP ATPIII Guidelines,EDDIE 2001, 258:2486-97    Venipuncture immediately after or during the administration of Metamizole may lead to falsely low results. Testing should be performed immediately prior to Metamizole dosing.   03/13/2023 12:35  0 - 199 mg/dL Final     Comment:     .      AGE      DESIRABLE   BORDERLINE HIGH   HIGH     0-19 Y     0 - 169       170 - 199     >/= 200    20-24 Y     0 - 189       190 - 224     >/= 225         >24 Y     0 - 199       200 - 239     >/= 240   **All ranges are based on fasting samples. Specific   therapeutic targets will vary based on patient-specific   cardiac risk.  .   Pediatric guidelines reference:Pediatrics 2011, 128(S5).   Adult guidelines reference: NCEP ATPIII Guidelines,     EDDIE 2001, 258:2486-97  .   Venipuncture immediately after or during the    administration of Metamizole may lead to falsely   low results. Testing should be performed immediately   prior to Metamizole dosing.     09/12/2022 02:40  0 - 199 mg/dL Final     Comment:     .      AGE      DESIRABLE   BORDERLINE HIGH   HIGH     0-19 Y     0 - 169       170 - 199     >/= 200    20-24 Y     0 - 189       190 - 224     >/= 225         >24 Y     0 - 199       200 - 239     >/= 240    **All ranges are based on fasting samples. Specific   therapeutic targets will vary based on patient-specific   cardiac risk.  .   Pediatric guidelines reference:Pediatrics 2011, 128(S5).   Adult guidelines reference: NCEP ATPIII Guidelines,     EDDIE 2001, 258:2486-97  .   Venipuncture immediately after or during the    administration of Metamizole may lead to falsely   low results. Testing should be performed immediately   prior to Metamizole dosing.       HDL-Cholesterol   Date/Time Value Ref Range Status   03/11/2024 09:51 AM 57.7 mg/dL Final     Comment:       Age       Very Low   Low     Normal    High    0-19 Y    < 35      < 40     40-45     ----  20-24 Y    ----     < 40      >45      ----        >24 Y      ----     < 40     40-60      >60       HDL   Date/Time Value Ref Range Status   03/13/2023 12:35 PM 66.4 mg/dL Final     Comment:     .      AGE      VERY LOW   LOW     NORMAL    HIGH       0-19 Y       < 35   < 40     40-45     ----    20-24 Y       ----   < 40       >45     ----      >24 Y       ----   < 40     40-60      >60  .     09/12/2022 02:40 PM 50.2 mg/dL Final     Comment:     .      AGE      VERY LOW   LOW     NORMAL    HIGH       0-19 Y       < 35   < 40     40-45     ----    20-24 Y       ----   < 40       >45     ----      >24 Y       ----   < 40     40-60      >60  .       Triglycerides   Date/Time Value Ref Range Status   03/11/2024 09:51  0 - 149 mg/dL Final     Comment:        Age         Desirable   Borderline High   High     Very High   0 D-90 D    19 - 174         ----         ----        ----  91 D- 9 Y     0 -  74        75 -  99     >/= 100      ----    10-19 Y     0 -  89        90 - 129     >/= 130      ----    20-24 Y     0 - 114       115 - 149     >/= 150      ----         >24 Y     0 - 149       150 - 199    200- 499    >/= 500    Venipuncture immediately after or during the administration of Metamizole may lead to falsely low results. Testing should be performed  immediately prior to Metamizole dosing.   03/13/2023 12:35 PM 93 0 - 149 mg/dL Final     Comment:     .      AGE      DESIRABLE   BORDERLINE HIGH   HIGH     VERY HIGH   0 D-90 D    19 - 174         ----         ----        ----  91 D- 9 Y     0 -  74        75 -  99     >/= 100      ----    10-19 Y     0 -  89        90 - 129     >/= 130      ----    20-24 Y     0 - 114       115 - 149     >/= 150      ----         >24 Y     0 - 149       150 - 199    200- 499    >/= 500  .   Venipuncture immediately after or during the    administration of Metamizole may lead to falsely   low results. Testing should be performed immediately   prior to Metamizole dosing.     09/12/2022 02:40  0 - 149 mg/dL Final     Comment:     .      AGE      DESIRABLE   BORDERLINE HIGH   HIGH     VERY HIGH   0 D-90 D    19 - 174         ----         ----        ----  91 D- 9 Y     0 -  74        75 -  99     >/= 100      ----    10-19 Y     0 -  89        90 - 129     >/= 130      ----    20-24 Y     0 - 114       115 - 149     >/= 150      ----         >24 Y     0 - 149       150 - 199    200- 499    >/= 500  .   Venipuncture immediately after or during the    administration of Metamizole may lead to falsely   low results. Testing should be performed immediately   prior to Metamizole dosing.         Hematologic Parameters     WBC   Date/Time Value Ref Range Status   12/07/2024 08:36 AM 5.9 4.4 - 11.3 x10*3/uL Final     Neutrophils Absolute   Date/Time Value Ref Range Status   12/07/2024 08:36 AM 3.00 1.20 - 7.70 x10*3/uL Final     Comment:     Percent differential counts (%) should be interpreted in the context of the absolute cell counts (cells/uL).     Neutrophils %   Date/Time Value Ref Range Status   12/07/2024 08:36 AM 50.8 40.0 - 80.0 % Final     Lymphocytes %   Date/Time Value Ref Range Status   12/07/2024 08:36 AM 36.1 13.0 - 44.0 % Final     Monocytes %   Date/Time Value Ref Range Status   12/07/2024 08:36 AM 8.3 2.0 - 10.0 %  Final     Basophils %   Date/Time Value Ref Range Status   12/07/2024 08:36 AM 1.4 0.0 - 2.0 % Final     Eosinophils Absolute   Date/Time Value Ref Range Status   12/07/2024 08:36 AM 0.19 0.00 - 0.70 x10*3/uL Final     Eosinophils %   Date/Time Value Ref Range Status   12/07/2024 08:36 AM 3.2 0.0 - 6.0 % Final     Hemoglobin   Date/Time Value Ref Range Status   12/07/2024 08:36 AM 14.4 13.5 - 17.5 g/dL Final     Hematocrit   Date/Time Value Ref Range Status   12/07/2024 08:36 AM 43.4 41.0 - 52.0 % Final     RBC   Date/Time Value Ref Range Status   12/07/2024 08:36 AM 4.45 (L) 4.50 - 5.90 x10*6/uL Final     MCV   Date/Time Value Ref Range Status   12/07/2024 08:36 AM 98 80 - 100 fL Final     MCHC   Date/Time Value Ref Range Status   12/07/2024 08:36 AM 33.2 32.0 - 36.0 g/dL Final     Platelets   Date/Time Value Ref Range Status   12/07/2024 08:36  150 - 450 x10*3/uL Final       Fat-Soluble Vitamin Levels     Vitamin D, 25-Hydroxy, Total   Date/Time Value Ref Range Status   03/11/2024 09:51 AM 46 30 - 100 ng/mL Final     Vitamin A (Retinol)   Date/Time Value Ref Range Status   03/11/2024 09:51 AM 0.48 0.30 - 1.20 mg/L Final     Vitamin A, Interpretation   Date/Time Value Ref Range Status   03/11/2024 09:51 AM Normal  Final     Comment:       This test was developed and its performance characteristics   determined by [facility]. It has not been cleared or   approved by the US Food and Drug Administration. This test was   performed in a CLIA certified laboratory and is intended for   clinical purposes.  Performed By: [facility]  [address] [place] [id number]  : Henrry Chapa MD, PhD  CLIA Number: [id number]     Vitamin E (Alpha-Tocopherol)   Date/Time Value Ref Range Status   03/11/2024 09:51 AM 10.5 5.5 - 18.0 mg/L Final     Comment:       This test was developed and its performance characteristics   determined by [facility]. It has not been cleared or    approved by the US Food and Drug Administration. This test was   performed in a CLIA certified laboratory and is intended for   clinical purposes.     Vitamin E (Gamma-Tocopherol)   Date/Time Value Ref Range Status   03/11/2024 09:51 AM 1.2 0.0 - 6.0 mg/L Final     Comment:     Performed By: [facility]  [address] [place] [address]  : Henrry Chapa MD, PhD  CLIA Number: [id number]       LFT and Associated Parameters     AST   Date/Time Value Ref Range Status   12/07/2024 08:36 AM 52 (H) 9 - 39 U/L Final     ALT   Date/Time Value Ref Range Status   12/07/2024 08:36 AM 89 (H) 10 - 52 U/L Final     Comment:     Patients treated with Sulfasalazine may generate falsely decreased results for ALT.     Alkaline Phosphatase   Date/Time Value Ref Range Status   12/07/2024 08:36  (H) 33 - 120 U/L Final     Bilirubin, Total   Date/Time Value Ref Range Status   12/07/2024 08:36 AM 0.7 0.0 - 1.2 mg/dL Final     Bilirubin, Direct   Date/Time Value Ref Range Status   03/11/2024 09:51 AM 0.1 0.0 - 0.3 mg/dL Final     GGT   Date/Time Value Ref Range Status   03/11/2024 09:51 AM 16 5 - 64 U/L Final     Albumin   Date/Time Value Ref Range Status   12/07/2024 08:36 AM 3.8 3.4 - 5.0 g/dL Final     Total Protein   Date/Time Value Ref Range Status   12/07/2024 08:36 AM 6.4 6.4 - 8.2 g/dL Final       Coagulation Parameters     Protime   Date/Time Value Ref Range Status   09/12/2022 02:40 PM 11.3 9.8 - 13.4 sec Final     INR   Date/Time Value Ref Range Status   09/12/2022 02:40 PM 1.0 0.9 - 1.1 Final       Diabetes Laboratory Tests     POC HEMOGLOBIN A1c   Date/Time Value Ref Range Status   08/15/2024 08:19 AM 5.8 4.2 - 6.5 % Final     Albumin/Creatine Ratio   Date/Time Value Ref Range Status   03/13/2023 12:35 PM SEE COMMENT 0.0 - 30.0 ug/mg crt Final     Comment:     One or more analytes used in this calculation   is outside of the analytical measurement range.  Calculation cannot be  performed.       Glucose tolerance test Fasting   Date/Time Value Ref Range Status   03/27/2019 11:35 AM 97 <126 mg/dL Final     Glucose tolerance test Two Hour   Date/Time Value Ref Range Status   03/27/2019 11:35  (A) <200 mg/dL Final        Immunology Laboratory Tests     IgE   Date/Time Value Ref Range Status   03/11/2024 09:51 AM 6 0 - 214 IU/mL Final     Colonoscopy     Colonoscopy (7/3/2024)  Findings  The terminal ileum appeared normal.;  Aphthous, superficial ulcer in the distal rectum  A moderate amount of semi-liquid stool was visualized throughout the colon.  Lavage was performed with incomplete clearance and fair visualization.  One 3 mm sessile polyp in the descending colon; performed cold snare with complete en bloc removal and retrieved specimen  Internal small hemorrhoids observed during retroflexion; no bleeding was identified    A. COLON - DESCENDING POLYP, BIOPSY:            -Tubular adenoma.    DEXA Scan     XR BONE density 11/28/2022    Narrative  Name: ANGY QUINONEZ  Patient ID: [id number] YOB: 1980 Height: 69.0 in.  Gender:     Male     Exam Date:  11/28/2022 Weight: 147.0 lbs.  Indications: Cystic Fibrosis, malabsorption    Treatments:  Calcium (E943.0), FLUTICASONE, OMEPRAZOLE, Vitamin D  (E933.5)    LEFT FEMUR - TOTAL  The bone mineral density : 0.926 g/cm2  T-score : -1.2    % of young normal mean :84%  Z-score : -1.0    % of age matched mean : 87%  % change vs. Previous : -0.1%    % change vs. Baseline : -1.8%    LEFT FEMUR - NECK  The bone mineral density : 0.882 g/cm2  T-score : -1.4    % of young normal mean: 82%  Z-score : -1.1    % of age matched mean : 86%  % change vs. Previous : 0.1%    % change vs. Baseline : 0.6%    SPINE L1-L4  The bone mineral density is 0.934 g/cm2  T-score : -2.4   % of young normal mean is 77%  Z-score : -2.4    % of age matched mean is 77%  % change vs. Previous : -2.4%    % change vs. Baseline : 0.1%    World Health Organization  (WHO) criteria for post-menopausal,   Women:  Normal:       T-score at or above -1 SD  Osteopenia:   T-score between -1 and -2.5 SD  Osteoporosis: T-score at or below -2.5 SD    10-Year Fracture Risk:  Major Osteoporotic Fracture N/A  Hip Fracture                N/A  Reported Risk Factors       None    Interpretation:  According to World Health Organization criteria, classification is low bone mass.  Followup recommended on November 2024 or sooner as clinically warranted.    Chest Radiograph     XR chest 2 view 11/28/2022    Narrative  MRN: [id number]  Patient Name: ANGY QUINONEZ    STUDY:  TH CHEST 2 VIEW PA AND LAT;    INDICATION:  CF  R63.6: Underweight.    COMPARISON:  09/26/2018.    ACCESSION NUMBER(S):  [id number]    ORDERING CLINICIAN:  GABY LOPEZ    FINDINGS:  The cardiac silhouette size is within normal limits.  There is no focal consolidation, edema or pneumothorax.  No sizeable pleural effusion.    Impression  No acute cardiopulmonary process.    CF Sputum Culture     AFB Culture   Date Value Ref Range Status   10/16/2024 No Mycobacteria isolated.  Final        Respiratory Culture, Cystic Fibrosis   Date/Time Value Ref Range Status   10/16/2024 03:51 PM (2+) Few Normal throat scarlet  Final   07/08/2024 11:40 AM (2+) Few Normal throat scarlet  Final   07/08/2024 11:40 AM (A)  Final    (1+) Rare Methicillin Susceptible Staphylococcus aureus (MSSA)   03/11/2024 10:16 AM (2+) Few Normal throat scarlet  Final   12/06/2023 03:03 PM (1+) Rare Normal throat scarlet  Final   09/13/2023 12:05 PM 1+ NORMAL THROAT SCARLET. (A)  Final   09/13/2023 12:05 PM Pseudomonas (A)  Final     Comment:     2+~FURTHER IDENTIFIED AS PSEUDOMONAS FULVA       Susceptibility data from last 3000 days.  Collected Specimen Info Organism Amikacin Aztreonam Cefepime Ceftazidime Ciprofloxacin Clindamycin Erythromycin Meropenem Oxacillin Piperacillin/Tazobactam Tetracycline Tobramycin Trimethoprim/Sulfamethoxazole Vancomycin   10/16/24  Fluid from SPUTUM Normal throat scarlet                 07/08/24 Fluid from Throat Swab Methicillin Susceptible Staphylococcus aureus (MSSA)       S  R   S   S   S  S     Normal throat scarlet                 03/11/24 Fluid from Throat Swab Normal throat scarlet                 12/06/23 Fluid from Throat Swab Normal throat scarlet                 09/13/23 Respiratory Pseudomonas S I S S S   S  I  S         Problem List Items Addressed This Visit       Cystic fibrosis - Primary    Current Assessment & Plan     Lung function down a bit from baseline today.  Question whether he had a bit of an exacerbation triggered by general anesthesia.  Will finish Augmentin course.  Continue Trikafta for now.  Expressed interest in switching to Alyftrek.  Not doing aerosols or chest physiotherapy.  Surveillance culture obtained today.  Sick plan: If recent Pseudomonas, ciprofloxacin 750 mg by mouth twice daily x 14 days; otherwise, if MSSA, Bactrim DS, 2 tablets by mouth twice daily x 14 days.         Relevant Orders    Follow Up In Pediatric Pulmonology    Respiratory Culture, Cystic Fibrosis (Completed)    Melanocytic nevus of trunk    Overview     Shave Biopsy (12/7/2023): MELANOCYTIC NEVUS WITH MILD JUNCTIONAL ARCHITECTURAL DISORDER AND ADJACENT DERMAL MELANOCYTIC NEVUS (SEE COMMENT)         Current Assessment & Plan     Dermatology following annually.  Missed appointment with Dr. Pacheco (dermatology) last month.  Needs to reschedule.         Obstruction of left ureteropelvic junction (UPJ) due to stone    Overview     ED visit on 12/7/2024 with left-sided flank and groin pain; CT scan showed 5-6 mm long, 4-5 mm diameter obstructing left UPJ stone with mild hydronephrosis; discharged with oral ketorolac and followed up in clinic with Dr. Montelongo from urology on 12/9/2024; then had interval cystoscopy with STENT INSERTION (LEFT), cystoscopy with retrograde pyelogram (LEFT) on 12/10/2024 by Dr. Montelongo; scheduled for left-sided lithotripsy and  stent removal on 1/7/2025.         Current Assessment & Plan     Stent removed, doing well.  I told Froy he can simply finish up the Augmentin prescribed by ED in [place] and not to start the ciprofloxacin.         Kidney stone    Current Assessment & Plan     Start Urocit-K 10 mEq twice daily for secondary prevention of (likely) calcium oxalate kidney stones.  Check urine pH at next appointment.  Question alkalinization.         Relevant Medications    potassium citrate CR (Urocit-K 10) 10 mEq ER tablet         Follow-up:    Future Appointments   Date Time Provider Department Center   3/31/2025 10:15 AM  RESP PFT TECH [id number] [facility]   3/31/2025  1:30 PM 6513-1, [facility] DEXA [facility] LK [id number] Academic   3/31/2025  2:00 PM  RESP PFT TECH [id number] Academic   4/15/2025  1:00 PM Claudette Pacheco APRN-CNP [id number] [place]        uDke Chatman MD   01/09/2025

## 2025-01-06 ENCOUNTER — ANESTHESIA EVENT (OUTPATIENT)
Dept: OPERATING ROOM | Facility: HOSPITAL | Age: 45
End: 2025-01-06
Payer: MEDICARE

## 2025-01-06 RX ORDER — OXYCODONE HYDROCHLORIDE 5 MG/1
5 TABLET ORAL ONCE
Status: CANCELLED | OUTPATIENT
Start: 2025-01-06 | End: 2025-01-06

## 2025-01-06 NOTE — PREPROCEDURE INSTRUCTIONS
No outpatient medications have been marked as taking for the 1/7/25 encounter (Hospital Encounter).                       NPO Instructions:    Do not eat any food after midnight the night before your surgery/procedure.  You may have clear liquids until TWO hours before surgery/procedure. This includes water, black tea/coffee, (no milk or cream) apple juice and electrolyte drinks (Gatorade).    Additional Instructions:     Will need  home, arrive on 2nd floor at 7 am on Tuesday January 7th

## 2025-01-07 ENCOUNTER — HOSPITAL ENCOUNTER (EMERGENCY)
Facility: HOSPITAL | Age: 45
Discharge: HOME | End: 2025-01-07
Payer: MEDICARE

## 2025-01-07 ENCOUNTER — ANESTHESIA (OUTPATIENT)
Dept: OPERATING ROOM | Facility: HOSPITAL | Age: 45
End: 2025-01-07
Payer: MEDICARE

## 2025-01-07 ENCOUNTER — APPOINTMENT (OUTPATIENT)
Dept: RADIOLOGY | Facility: HOSPITAL | Age: 45
End: 2025-01-07
Payer: MEDICARE

## 2025-01-07 ENCOUNTER — HOSPITAL ENCOUNTER (OUTPATIENT)
Facility: HOSPITAL | Age: 45
Setting detail: OUTPATIENT SURGERY
Discharge: HOME | End: 2025-01-07
Attending: UROLOGY | Admitting: UROLOGY
Payer: MEDICARE

## 2025-01-07 VITALS
TEMPERATURE: 97.3 F | HEART RATE: 66 BPM | RESPIRATION RATE: 16 BRPM | SYSTOLIC BLOOD PRESSURE: 121 MMHG | BODY MASS INDEX: 21.48 KG/M2 | WEIGHT: 145 LBS | DIASTOLIC BLOOD PRESSURE: 82 MMHG | OXYGEN SATURATION: 95 % | HEIGHT: 69 IN

## 2025-01-07 VITALS
BODY MASS INDEX: 21.94 KG/M2 | RESPIRATION RATE: 14 BRPM | HEIGHT: 69 IN | TEMPERATURE: 98.1 F | OXYGEN SATURATION: 94 % | HEART RATE: 51 BPM | SYSTOLIC BLOOD PRESSURE: 128 MMHG | WEIGHT: 148.15 LBS | DIASTOLIC BLOOD PRESSURE: 82 MMHG

## 2025-01-07 DIAGNOSIS — R51.9 ACUTE NONINTRACTABLE HEADACHE, UNSPECIFIED HEADACHE TYPE: ICD-10-CM

## 2025-01-07 DIAGNOSIS — N20.0 KIDNEY STONE: Primary | ICD-10-CM

## 2025-01-07 DIAGNOSIS — R11.2 NAUSEA AND VOMITING, UNSPECIFIED VOMITING TYPE: Primary | ICD-10-CM

## 2025-01-07 PROBLEM — Z79.4 TYPE 2 DIABETES MELLITUS, WITH LONG-TERM CURRENT USE OF INSULIN: Status: ACTIVE | Noted: 2025-01-07

## 2025-01-07 PROBLEM — E11.9 TYPE 2 DIABETES MELLITUS, WITH LONG-TERM CURRENT USE OF INSULIN: Status: ACTIVE | Noted: 2025-01-07

## 2025-01-07 LAB
ALBUMIN SERPL BCP-MCNC: 3.7 G/DL (ref 3.4–5)
ALP SERPL-CCNC: 105 U/L (ref 33–120)
ALT SERPL W P-5'-P-CCNC: 33 U/L (ref 10–52)
ANION GAP SERPL CALC-SCNC: 9 MMOL/L (ref 10–20)
AST SERPL W P-5'-P-CCNC: 21 U/L (ref 9–39)
BILIRUB SERPL-MCNC: 0.7 MG/DL (ref 0–1.2)
BUN SERPL-MCNC: 19 MG/DL (ref 6–23)
CALCIUM SERPL-MCNC: 8.7 MG/DL (ref 8.6–10.3)
CHLORIDE SERPL-SCNC: 103 MMOL/L (ref 98–107)
CO2 SERPL-SCNC: 30 MMOL/L (ref 21–32)
CREAT SERPL-MCNC: 0.97 MG/DL (ref 0.5–1.3)
EGFRCR SERPLBLD CKD-EPI 2021: >90 ML/MIN/1.73M*2
ERYTHROCYTE [DISTWIDTH] IN BLOOD BY AUTOMATED COUNT: 13 % (ref 11.5–14.5)
GLUCOSE BLD MANUAL STRIP-MCNC: 95 MG/DL (ref 74–99)
GLUCOSE SERPL-MCNC: 130 MG/DL (ref 74–99)
HCT VFR BLD AUTO: 39.9 % (ref 41–52)
HGB BLD-MCNC: 13.2 G/DL (ref 13.5–17.5)
MCH RBC QN AUTO: 32 PG (ref 26–34)
MCHC RBC AUTO-ENTMCNC: 33.1 G/DL (ref 32–36)
MCV RBC AUTO: 97 FL (ref 80–100)
NRBC BLD-RTO: 0 /100 WBCS (ref 0–0)
PLATELET # BLD AUTO: 215 X10*3/UL (ref 150–450)
POTASSIUM SERPL-SCNC: 4 MMOL/L (ref 3.5–5.3)
PROT SERPL-MCNC: 6.4 G/DL (ref 6.4–8.2)
RBC # BLD AUTO: 4.13 X10*6/UL (ref 4.5–5.9)
SODIUM SERPL-SCNC: 138 MMOL/L (ref 136–145)
WBC # BLD AUTO: 11.3 X10*3/UL (ref 4.4–11.3)

## 2025-01-07 PROCEDURE — 2500000005 HC RX 250 GENERAL PHARMACY W/O HCPCS: Performed by: ANESTHESIOLOGY

## 2025-01-07 PROCEDURE — 7100000001 HC RECOVERY ROOM TIME - INITIAL BASE CHARGE: Performed by: UROLOGY

## 2025-01-07 PROCEDURE — 2500000004 HC RX 250 GENERAL PHARMACY W/ HCPCS (ALT 636 FOR OP/ED): Performed by: ANESTHESIOLOGY

## 2025-01-07 PROCEDURE — 3700000002 HC GENERAL ANESTHESIA TIME - EACH INCREMENTAL 1 MINUTE: Performed by: UROLOGY

## 2025-01-07 PROCEDURE — 50590 FRAGMENTING OF KIDNEY STONE: CPT | Performed by: UROLOGY

## 2025-01-07 PROCEDURE — 52310 CYSTOSCOPY AND TREATMENT: CPT | Performed by: UROLOGY

## 2025-01-07 PROCEDURE — 70450 CT HEAD/BRAIN W/O DYE: CPT | Performed by: SURGERY

## 2025-01-07 PROCEDURE — 85027 COMPLETE CBC AUTOMATED: CPT | Performed by: PHYSICIAN ASSISTANT

## 2025-01-07 PROCEDURE — 2500000004 HC RX 250 GENERAL PHARMACY W/ HCPCS (ALT 636 FOR OP/ED): Performed by: UROLOGY

## 2025-01-07 PROCEDURE — 2500000001 HC RX 250 WO HCPCS SELF ADMINISTERED DRUGS (ALT 637 FOR MEDICARE OP): Performed by: ANESTHESIOLOGY

## 2025-01-07 PROCEDURE — 2720000007 HC OR 272 NO HCPCS: Performed by: UROLOGY

## 2025-01-07 PROCEDURE — 3600000003 HC OR TIME - INITIAL BASE CHARGE - PROCEDURE LEVEL THREE: Performed by: UROLOGY

## 2025-01-07 PROCEDURE — 74420 UROGRAPHY RTRGR +-KUB: CPT | Performed by: UROLOGY

## 2025-01-07 PROCEDURE — 2550000001 HC RX 255 CONTRASTS: Performed by: UROLOGY

## 2025-01-07 PROCEDURE — 7100000010 HC PHASE TWO TIME - EACH INCREMENTAL 1 MINUTE: Performed by: UROLOGY

## 2025-01-07 PROCEDURE — 7100000009 HC PHASE TWO TIME - INITIAL BASE CHARGE: Performed by: UROLOGY

## 2025-01-07 PROCEDURE — 3600000008 HC OR TIME - EACH INCREMENTAL 1 MINUTE - PROCEDURE LEVEL THREE: Performed by: UROLOGY

## 2025-01-07 PROCEDURE — 7100000002 HC RECOVERY ROOM TIME - EACH INCREMENTAL 1 MINUTE: Performed by: UROLOGY

## 2025-01-07 PROCEDURE — 2500000004 HC RX 250 GENERAL PHARMACY W/ HCPCS (ALT 636 FOR OP/ED): Performed by: PHYSICIAN ASSISTANT

## 2025-01-07 PROCEDURE — 80053 COMPREHEN METABOLIC PANEL: CPT | Performed by: PHYSICIAN ASSISTANT

## 2025-01-07 PROCEDURE — 70450 CT HEAD/BRAIN W/O DYE: CPT

## 2025-01-07 PROCEDURE — 82947 ASSAY GLUCOSE BLOOD QUANT: CPT

## 2025-01-07 PROCEDURE — 3700000001 HC GENERAL ANESTHESIA TIME - INITIAL BASE CHARGE: Performed by: UROLOGY

## 2025-01-07 PROCEDURE — C1769 GUIDE WIRE: HCPCS | Performed by: UROLOGY

## 2025-01-07 PROCEDURE — 36415 COLL VENOUS BLD VENIPUNCTURE: CPT | Performed by: PHYSICIAN ASSISTANT

## 2025-01-07 PROCEDURE — 99284 EMERGENCY DEPT VISIT MOD MDM: CPT | Mod: 25

## 2025-01-07 RX ORDER — LIDOCAINE HYDROCHLORIDE 10 MG/ML
INJECTION, SOLUTION EPIDURAL; INFILTRATION; INTRACAUDAL; PERINEURAL AS NEEDED
Status: DISCONTINUED | OUTPATIENT
Start: 2025-01-07 | End: 2025-01-07

## 2025-01-07 RX ORDER — FENTANYL CITRATE 50 UG/ML
INJECTION, SOLUTION INTRAMUSCULAR; INTRAVENOUS AS NEEDED
Status: DISCONTINUED | OUTPATIENT
Start: 2025-01-07 | End: 2025-01-07

## 2025-01-07 RX ORDER — PROPOFOL 10 MG/ML
INJECTION, EMULSION INTRAVENOUS AS NEEDED
Status: DISCONTINUED | OUTPATIENT
Start: 2025-01-07 | End: 2025-01-07

## 2025-01-07 RX ORDER — OXYCODONE HYDROCHLORIDE 5 MG/1
5 TABLET ORAL ONCE
Status: COMPLETED | OUTPATIENT
Start: 2025-01-07 | End: 2025-01-07

## 2025-01-07 RX ORDER — CIPROFLOXACIN 500 MG/1
500 TABLET ORAL 2 TIMES DAILY
Qty: 6 TABLET | Refills: 0 | Status: SHIPPED | OUTPATIENT
Start: 2025-01-07 | End: 2025-01-09 | Stop reason: ALTCHOICE

## 2025-01-07 RX ORDER — SODIUM CHLORIDE, SODIUM LACTATE, POTASSIUM CHLORIDE, CALCIUM CHLORIDE 600; 310; 30; 20 MG/100ML; MG/100ML; MG/100ML; MG/100ML
100 INJECTION, SOLUTION INTRAVENOUS CONTINUOUS
Status: DISCONTINUED | OUTPATIENT
Start: 2025-01-07 | End: 2025-01-07 | Stop reason: HOSPADM

## 2025-01-07 RX ORDER — LIDOCAINE HYDROCHLORIDE 40 MG/ML
SOLUTION TOPICAL AS NEEDED
Status: DISCONTINUED | OUTPATIENT
Start: 2025-01-07 | End: 2025-01-07

## 2025-01-07 RX ORDER — ROCURONIUM BROMIDE 10 MG/ML
INJECTION, SOLUTION INTRAVENOUS AS NEEDED
Status: DISCONTINUED | OUTPATIENT
Start: 2025-01-07 | End: 2025-01-07

## 2025-01-07 RX ORDER — MIDAZOLAM HYDROCHLORIDE 1 MG/ML
2 INJECTION INTRAMUSCULAR; INTRAVENOUS ONCE AS NEEDED
Status: COMPLETED | OUTPATIENT
Start: 2025-01-07 | End: 2025-01-07

## 2025-01-07 RX ORDER — SUCCINYLCHOLINE CHLORIDE 100 MG/5ML
SYRINGE (ML) INTRAVENOUS AS NEEDED
Status: DISCONTINUED | OUTPATIENT
Start: 2025-01-07 | End: 2025-01-07

## 2025-01-07 RX ORDER — PHENAZOPYRIDINE HYDROCHLORIDE 200 MG/1
200 TABLET, FILM COATED ORAL 3 TIMES DAILY PRN
Qty: 30 TABLET | Refills: 0 | Status: SHIPPED | OUTPATIENT
Start: 2025-01-07 | End: 2025-01-09 | Stop reason: ALTCHOICE

## 2025-01-07 RX ORDER — ONDANSETRON 4 MG/1
4 TABLET, ORALLY DISINTEGRATING ORAL ONCE
Status: COMPLETED | OUTPATIENT
Start: 2025-01-07 | End: 2025-01-07

## 2025-01-07 RX ORDER — PROMETHAZINE HYDROCHLORIDE 25 MG/ML
12.5 INJECTION, SOLUTION INTRAMUSCULAR; INTRAVENOUS ONCE
Status: DISCONTINUED | OUTPATIENT
Start: 2025-01-07 | End: 2025-01-07

## 2025-01-07 RX ORDER — LEVOFLOXACIN 5 MG/ML
500 INJECTION, SOLUTION INTRAVENOUS ONCE
Status: COMPLETED | OUTPATIENT
Start: 2025-01-07 | End: 2025-01-07

## 2025-01-07 RX ORDER — HYDROCODONE BITARTRATE AND ACETAMINOPHEN 5; 325 MG/1; MG/1
1 TABLET ORAL EVERY 6 HOURS PRN
Qty: 20 TABLET | Refills: 0 | Status: SHIPPED | OUTPATIENT
Start: 2025-01-07 | End: 2025-01-09 | Stop reason: ALTCHOICE

## 2025-01-07 RX ORDER — ONDANSETRON HYDROCHLORIDE 2 MG/ML
INJECTION, SOLUTION INTRAVENOUS AS NEEDED
Status: DISCONTINUED | OUTPATIENT
Start: 2025-01-07 | End: 2025-01-07

## 2025-01-07 RX ADMIN — Medication 100 MG: at 08:57

## 2025-01-07 RX ADMIN — LEVOFLOXACIN 500 MG: 500 INJECTION, SOLUTION INTRAVENOUS at 07:58

## 2025-01-07 RX ADMIN — LIDOCAINE HYDROCHLORIDE 3 ML: 40 SOLUTION TOPICAL at 08:58

## 2025-01-07 RX ADMIN — LIDOCAINE HYDROCHLORIDE 50 MG: 10 INJECTION, SOLUTION EPIDURAL; INFILTRATION; INTRACAUDAL; PERINEURAL at 09:05

## 2025-01-07 RX ADMIN — MIDAZOLAM HYDROCHLORIDE 2 MG: 1 INJECTION, SOLUTION INTRAMUSCULAR; INTRAVENOUS at 08:44

## 2025-01-07 RX ADMIN — LIDOCAINE HYDROCHLORIDE 50 MG: 10 INJECTION, SOLUTION EPIDURAL; INFILTRATION; INTRACAUDAL; PERINEURAL at 08:54

## 2025-01-07 RX ADMIN — Medication 2 L/MIN: at 10:15

## 2025-01-07 RX ADMIN — PROPOFOL 150 MG: 10 INJECTION, EMULSION INTRAVENOUS at 08:57

## 2025-01-07 RX ADMIN — SODIUM CHLORIDE, POTASSIUM CHLORIDE, SODIUM LACTATE AND CALCIUM CHLORIDE: 600; 310; 30; 20 INJECTION, SOLUTION INTRAVENOUS at 09:29

## 2025-01-07 RX ADMIN — SODIUM CHLORIDE, POTASSIUM CHLORIDE, SODIUM LACTATE AND CALCIUM CHLORIDE 100 ML/HR: 600; 310; 30; 20 INJECTION, SOLUTION INTRAVENOUS at 07:45

## 2025-01-07 RX ADMIN — OXYCODONE HYDROCHLORIDE 5 MG: 5 TABLET ORAL at 10:38

## 2025-01-07 RX ADMIN — FENTANYL CITRATE 100 MCG: 50 INJECTION, SOLUTION INTRAMUSCULAR; INTRAVENOUS at 08:54

## 2025-01-07 RX ADMIN — ONDANSETRON 4 MG: 2 INJECTION, SOLUTION INTRAMUSCULAR; INTRAVENOUS at 09:24

## 2025-01-07 RX ADMIN — ONDANSETRON 4 MG: 4 TABLET, ORALLY DISINTEGRATING ORAL at 20:41

## 2025-01-07 RX ADMIN — ROCURONIUM BROMIDE 10 MG: 10 INJECTION INTRAVENOUS at 08:57

## 2025-01-07 SDOH — HEALTH STABILITY: MENTAL HEALTH: CURRENT SMOKER: 0

## 2025-01-07 ASSESSMENT — COLUMBIA-SUICIDE SEVERITY RATING SCALE - C-SSRS
1. IN THE PAST MONTH, HAVE YOU WISHED YOU WERE DEAD OR WISHED YOU COULD GO TO SLEEP AND NOT WAKE UP?: NO
1. IN THE PAST MONTH, HAVE YOU WISHED YOU WERE DEAD OR WISHED YOU COULD GO TO SLEEP AND NOT WAKE UP?: NO
6. HAVE YOU EVER DONE ANYTHING, STARTED TO DO ANYTHING, OR PREPARED TO DO ANYTHING TO END YOUR LIFE?: NO
2. HAVE YOU ACTUALLY HAD ANY THOUGHTS OF KILLING YOURSELF?: NO
6. HAVE YOU EVER DONE ANYTHING, STARTED TO DO ANYTHING, OR PREPARED TO DO ANYTHING TO END YOUR LIFE?: NO
2. HAVE YOU ACTUALLY HAD ANY THOUGHTS OF KILLING YOURSELF?: NO

## 2025-01-07 ASSESSMENT — ENCOUNTER SYMPTOMS
SHORTNESS OF BREATH: 0
CHILLS: 0
PSYCHIATRIC NEGATIVE: 1
ALLERGIC/IMMUNOLOGIC NEGATIVE: 1
EYES NEGATIVE: 1
FEVER: 0
ENDOCRINE NEGATIVE: 1
COUGH: 0
NAUSEA: 0
DIFFICULTY URINATING: 0

## 2025-01-07 ASSESSMENT — PAIN - FUNCTIONAL ASSESSMENT
PAIN_FUNCTIONAL_ASSESSMENT: 0-10

## 2025-01-07 ASSESSMENT — PAIN SCALES - GENERAL
PAINLEVEL_OUTOF10: 0 - NO PAIN
PAINLEVEL_OUTOF10: 0 - NO PAIN
PAINLEVEL_OUTOF10: 2
PAINLEVEL_OUTOF10: 5 - MODERATE PAIN
PAINLEVEL_OUTOF10: 0 - NO PAIN
PAINLEVEL_OUTOF10: 5 - MODERATE PAIN
PAINLEVEL_OUTOF10: 5 - MODERATE PAIN
PAINLEVEL_OUTOF10: 0 - NO PAIN
PAINLEVEL_OUTOF10: 5 - MODERATE PAIN
PAINLEVEL_OUTOF10: 0 - NO PAIN
PAINLEVEL_OUTOF10: 5 - MODERATE PAIN
PAINLEVEL_OUTOF10: 0 - NO PAIN
PAINLEVEL_OUTOF10: 5 - MODERATE PAIN

## 2025-01-07 ASSESSMENT — PAIN DESCRIPTION - ORIENTATION: ORIENTATION_2: LEFT

## 2025-01-07 ASSESSMENT — PAIN DESCRIPTION - PAIN TYPE: TYPE: ACUTE PAIN

## 2025-01-07 ASSESSMENT — PAIN DESCRIPTION - LOCATION
LOCATION: HEAD
LOCATION_2: BACK

## 2025-01-07 NOTE — ANESTHESIA PREPROCEDURE EVALUATION
Patient: Froy Parisi    Procedure Information       Anesthesia Start Date/Time: 01/07/25 0849    Procedures:       LITHOTRIPSY, ESWL (Left)      CYSTOSCOPY, WITH URETERAL STENT REMOVAL (Left)      URETEROSCOPY (Left)      CYSTOSCOPY, WITH RETROGRADE PYELOGRAM (Left)    Location: [facility] OR 05 / [facility]    Surgeons: Andre Montelongo MD            Relevant Problems   Cardiac   (+) Pleurodynia      Pulmonary   (+) Chronic obstructive pulmonary disease (Multi)      GI   (+) GERD (gastroesophageal reflux disease)      /Renal   (+) Kidney stone      Hematology   (+) Bleeding diathesis due to vitamin K deficiency (Multi)      HEENT   (+) Sensorineural hearing loss (SNHL) of left ear with restricted hearing of right ear      ID   (+) Pseudomonas aeruginosa infection       Clinical information reviewed:   Tobacco  Allergies  Meds  Problems  Med Hx  Surg Hx   Fam Hx  Soc   Hx        NPO Detail:  NPO/Void Status  Carbohydrate Drink Given Prior to Surgery? : N  Date of Last Liquid: 01/06/25  Time of Last Liquid: 2100  Date of Last Solid: 01/06/25  Time of Last Solid: 1900  Time of Last Void: 0700         Physical Exam    Airway  Mallampati: I  TM distance: >3 FB     Cardiovascular   Rhythm: regular  Rate: normal     Dental - normal exam     Pulmonary - normal exam     Abdominal            Anesthesia Plan    History of general anesthesia?: yes  History of complications of general anesthesia?: no    ASA 3     general     The patient is not a current smoker.  Patient did not smoke on day of procedure.    intravenous induction   Anesthetic plan and risks discussed with patient.

## 2025-01-07 NOTE — ANESTHESIA PROCEDURE NOTES
Airway  Date/Time: 1/7/2025 8:58 AM  Urgency: elective    Airway not difficult    Staffing  Performed: attending   Authorized by: Andre Cody MD PhD    Performed by: Andre Cody MD PhD  Patient location during procedure: OR    Indications and Patient Condition  Indications for airway management: anesthesia and airway protection  Spontaneous Ventilation: absent  Sedation level: deep  Preoxygenated: yes  MILS maintained throughout  Mask difficulty assessment: 1 - vent by mask    Final Airway Details  Final airway type: endotracheal airway      Successful airway: ETT  Cuffed: yes   Successful intubation technique: direct laryngoscopy  Facilitating devices/methods: intubating stylet  Endotracheal tube insertion site: oral  Blade: Simran  Blade size: #4  ETT size (mm): 7.5  Cormack-Lehane Classification: grade IIa - partial view of glottis  Placement verified by: chest auscultation and capnometry   Number of attempts at approach: 1

## 2025-01-07 NOTE — H&P
History Of Present Illness  Froy Parisi is a 44 y.o. male presenting with left renal stone.     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    Gastrostomy site leak (Multi) 08/01/2024    Gastrostomy status (Multi) 12/05/2023    Influenza due to other identified influenza virus with other respiratory manifestations 11/08/2016    Influenza A    Need for zoster vaccination 12/08/2023    Other specified respiratory disorders 10/18/2021    Pseudomonas respiratory infection    Tubular adenoma of colon 06/11/2024       Surgical History  Past Surgical History:   Procedure Laterality Date    CYSTOSCOPY W/ URETERAL STENT PLACEMENT Left 12/10/2024    GASTROSTOMY TUBE PLACEMENT  08/15/2018    Percutaneous Placement Of Gastrostomy Tube        Social History  He reports that he has never smoked. He has never used smokeless tobacco. He reports current drug use. Drug: Marijuana. He reports that he does not drink alcohol.    Family History  Family History   Problem Relation Name Age of Onset    Obesity Father's Brother      Hypertension Paternal Grandmother      Heart defect Cousin      Cystic fibrosis Cousin      Cystic fibrosis Cousin      Esophageal cancer Neg Hx      Stomach cancer Neg Hx      Colon cancer Neg Hx      Hyperlipidemia Neg Hx          Allergies  Patient has no known allergies.    Review of Systems   Constitutional:  Negative for chills and fever.   HENT: Negative.     Eyes: Negative.    Respiratory:  Negative for cough and shortness of breath.    Cardiovascular:  Negative for chest pain and leg swelling.   Gastrointestinal:  Negative for nausea.   Endocrine: Negative.    Genitourinary:  Negative for difficulty urinating.        Negative except for documented in HPI   Allergic/Immunologic: Negative.    Neurological:         Alert & oriented X 3   Hematological:         Denies blood thinners  "  Psychiatric/Behavioral: Negative.          Physical Exam  Vitals and nursing note reviewed.   Pulmonary:      Effort: Pulmonary effort is normal.   Abdominal:      Palpations: Abdomen is soft.      Tenderness: There is no abdominal tenderness.   Genitourinary:     Comments: Kidneys non palpable bilaterally  Bladder non palpable or tender  Neurological:      Mental Status: He is alert.          Last Recorded Vitals  Blood pressure 121/84, pulse 63, temperature 37.2 °C (98.9 °F), temperature source Temporal, resp. rate 18, height 1.753 m (5' 9\"), weight 67.2 kg (148 lb 2.4 oz), SpO2 93%.         Assessment/Plan   Assessment & Plan  Kidney stone            Andre Montelongo MD    "

## 2025-01-07 NOTE — OP NOTE
LITHOTRIPSY, ESWL (L), CYSTOSCOPY, WITH URETERAL STENT REMOVAL (L), URETEROSCOPY (L), CYSTOSCOPY, WITH RETROGRADE PYELOGRAM (L) Operative Note     Date: 2025  OR Location: [facility] OR    Name: Froy Parisi, : 1980, Age: 44 y.o., MRN: [id number], Sex: male    Diagnosis  Pre-op Diagnosis      * Kidney stone [N20.0] Post-op Diagnosis     * Kidney stone [N20.0]     Procedures  LITHOTRIPSY, ESWL  55814 - IN LITHOTRIPSY XTRCORP SHOCK WAVE    CYSTOSCOPY, WITH URETERAL STENT REMOVAL  45348 - IN CYSTO W/SIMPLE REMOVAL STONE & STENT    URETEROSCOPY  47985 - IN CYSTO W/URTROSCOPY&/PYELOSCOPY DX    CYSTOSCOPY, WITH RETROGRADE PYELOGRAM  85990 - CHG UROGRAPHY RETROGRADE WITH/WO KUB      Surgeons      * Andre Montelongo - Primary    Resident/Fellow/Other Assistant:  Surgeons and Role:  * No surgeons found with a matching role *    Staff:   Circulator: Zaida Geiger Person: Tato    Anesthesia Staff: Anesthesiologist: Andre Cody MD PhD    Procedure Summary  Anesthesia: Anesthesia type not filed in the log.  ASA: ASA status not filed in the log.  Estimated Blood Loss: 0mL  Intra-op Medications:   Administrations occurring from 0830 to 0900 on 25:   Medication Name Total Dose   fentaNYL (Sublimaze) injection 50 mcg/mL 100 mcg   lidocaine (LTA Kit) for intubation 3 mL   lidocaine PF (Xylocaine-MPF) local injection 1 % 50 mg   propofol (Diprivan) injection 10 mg/mL 150 mg   rocuronium (ZeMuron) 50 mg/5 mL injection 10 mg   succinylcholine chloride injection syringe 100 mg/5 mL 100 mg   midazolam (Versed) injection 2 mg 2 mg              Anesthesia Record               Intraprocedure I/O Totals       None           Specimen: No specimens collected              Drains and/or Catheters: * None in log *    Tourniquet Times:           Indications: Froy Parisi is an 44 y.o. male who is having surgery for Kidney stone [N20.0].     The patient was seen in the preoperative area. The risks, benefits, complications,  treatment options, non-operative alternatives, expected recovery and outcomes were discussed with the patient. The possibilities of reaction to medication, pulmonary aspiration, injury to surrounding structures, bleeding, recurrent infection, the need for additional procedures, failure to diagnose a condition, and creating a complication requiring transfusion or operation were discussed with the patient. The patient concurred with the proposed plan, giving informed consent.  The site of surgery was properly noted/marked if necessary per policy. The patient has been actively warmed in preoperative area.   Preoperative diagnosis: Left ureteral stone  Postoperative diagnosis: Same  Procedure: cystoscopy with Left RPG and ureteroscopy and Left stent removal, LEFT ESWL  Physician: JAKE  Anesthesia: Gen.  Estimated blood loss: None  Indications and consent: The patient presents for treatment of a left ureteral stone. After the risks, benefits, alternatives, and indications of the procedure were explained to the patient they consented.  Procedure: The patient was brought to the operating room and placed on the table in the supine position. After adequate anesthesia was obtained, the patient was prepped and draped in the standard surgical fashion. First, the cystoscope was inserted into the bladder. Formal cystoscopy was performed. The previously placed left stetn was removed. A Left retrograde pyelogram was performed which suggested a filling defect in the ureter We then performed ureteroscopy , no ureteral stones seen. A left ESWL was performed, 2500 shocks delivered. The patient tolerated the procedure well and there no complications.     Follow up 4 months with a KUB    Attending Attestation: I was present and scrubbed for the entire procedure.    Andre Montelongo  Phone Number: [phone]

## 2025-01-07 NOTE — ANESTHESIA POSTPROCEDURE EVALUATION
Patient: Froy Parisi    Procedure Summary       Date: 01/07/25 Room / Location: [facility] OR 05 / [facility] OR    Anesthesia Start: 0849 Anesthesia Stop: 0949    Procedures:       LITHOTRIPSY, ESWL (Left)      CYSTOSCOPY, WITH URETERAL STENT REMOVAL (Left)      URETEROSCOPY (Left)      CYSTOSCOPY, WITH RETROGRADE PYELOGRAM (Left) Diagnosis:       Kidney stone      (Kidney stone [N20.0])    Surgeons: Andre Montelongo MD Responsible Provider: Andre Cody MD PhD    Anesthesia Type: general ASA Status: 3            Anesthesia Type: general    Vitals Value Taken Time   /70 01/07/25 1011   Temp 36.7 °C (98.1 °F) 01/07/25 1010   Pulse 49 01/07/25 1013   Resp 8 01/07/25 1013   SpO2 97 % 01/07/25 1013   Vitals shown include unfiled device data.    Anesthesia Post Evaluation    Patient location during evaluation: PACU  Patient participation: complete - patient participated  Level of consciousness: awake and alert  Pain management: satisfactory to patient  Airway patency: patent  Cardiovascular status: hemodynamically stable  Respiratory status: spontaneous ventilation and unassisted  Hydration status: euvolemic  Postoperative Nausea and Vomiting: none    No notable events documented.

## 2025-01-08 NOTE — ED PROVIDER NOTES
HPI   Chief Complaint   Patient presents with    Vomiting     Pt. Had a Lithotripsy performed today with Dr. Montelongo for L Kidney stones. Reports he was fine post procedure until this evening, he has vomited 5-6 times not relieved by ODT zofran. Does endorse HA and L sided back pain       Patient presents with headache and vomiting.  States he had lithotripsy today and went home without any concern.  States he rested for period of time.  States when he woke up he noted that he had a headache that was mostly on the left side of his neck.  And started vomiting.  States he did not feel nauseous but states his mouth would just start watering and then he would vomit.  Denies any abdominal pain.  No chest pain or shortness of breath.  No cough or congestion.  Patient denies any trouble with his vision.  Patient states over time the headache has improved upon arrival to the emergency department but still present.  Patient states he is not currently nauseous.      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    Gastrostomy site leak (Multi) 08/01/2024    Gastrostomy status (Multi) 12/05/2023    Influenza due to other identified influenza virus with other respiratory manifestations 11/08/2016    Influenza A    Need for zoster vaccination 12/08/2023    Other specified respiratory disorders 10/18/2021    Pseudomonas respiratory infection    Tubular adenoma of colon 06/11/2024     Past Surgical History:   Procedure Laterality Date    CYSTOSCOPY W/ URETERAL STENT PLACEMENT Left 12/10/2024    GASTROSTOMY TUBE PLACEMENT  08/15/2018    Percutaneous Placement Of Gastrostomy Tube     Family History   Problem Relation Name Age of Onset    Obesity Father's Brother      Hypertension Paternal Grandmother      Heart defect Cousin      Cystic fibrosis Cousin      Cystic fibrosis Cousin      Esophageal cancer Neg  Hx      Stomach cancer Neg Hx      Colon cancer Neg Hx      Hyperlipidemia Neg Hx       Social History     Tobacco Use    Smoking status: Never    Smokeless tobacco: Never   Vaping Use    Vaping status: Every Day    Substances: THC    Devices: Disposable   Substance Use Topics    Alcohol use: Never    Drug use: Yes     Types: Marijuana     Comment: has medical marijuana- last used today       Physical Exam   ED Triage Vitals [01/07/25 2011]   Temperature Heart Rate Respirations BP   36.3 °C (97.3 °F) 66 16 121/82      Pulse Ox Temp Source Heart Rate Source Patient Position   95 % Temporal Monitor --      BP Location FiO2 (%)     -- --       Physical Exam  Vitals and nursing note reviewed.   Constitutional:       General: He is not in acute distress.     Appearance: Normal appearance. He is normal weight. He is not ill-appearing or toxic-appearing.   HENT:      Head: Normocephalic.      Right Ear: External ear normal.      Left Ear: External ear normal.      Nose: Nose normal.      Mouth/Throat:      Lips: Pink. No lesions.      Mouth: Mucous membranes are moist.   Eyes:      General: No scleral icterus.     Conjunctiva/sclera: Conjunctivae normal.   Neck:      Meningeal: Kernig's sign absent.   Cardiovascular:      Rate and Rhythm: Normal rate and regular rhythm.      Pulses:           Radial pulses are 2+ on the right side and 2+ on the left side.      Heart sounds: Normal heart sounds.   Pulmonary:      Effort: Pulmonary effort is normal.      Breath sounds: Normal breath sounds and air entry.   Chest:      Chest wall: No tenderness.   Abdominal:      General: Bowel sounds are normal. There is no distension.      Palpations: Abdomen is soft.      Tenderness: There is no abdominal tenderness. There is no right CVA tenderness, left CVA tenderness or guarding.   Musculoskeletal:      Cervical back: Muscular tenderness present. No spinous process tenderness.      Right lower leg: No edema.      Left lower leg: No  edema.   Skin:     General: Skin is warm.      Capillary Refill: Capillary refill takes less than 2 seconds.   Neurological:      General: No focal deficit present.      Mental Status: He is alert and oriented to person, place, and time.      Cranial Nerves: No cranial nerve deficit or facial asymmetry.      Sensory: No sensory deficit.      Motor: No weakness.      Gait: Gait normal.   Psychiatric:         Attention and Perception: Attention and perception normal.         Mood and Affect: Mood and affect normal.         Speech: Speech normal.         Behavior: Behavior normal. Behavior is cooperative.         Thought Content: Thought content normal.         Cognition and Memory: Cognition and memory normal.         Judgment: Judgment normal.           ED Course & MDM   Diagnoses as of 01/07/25 2150   Nausea and vomiting, unspecified vomiting type   Acute nonintractable headache, unspecified headache type                 No data recorded     Angelina Coma Scale Score: 15 (01/07/25 2012 : Devon Dang RN)                           Medical Decision Making  Patient presents with headache and vomiting.  States he had lithotripsy today and went home without any concern.  States he rested for period of time.  States when he woke up he noted that he had a headache that was mostly on the left side of his neck.  And started vomiting.  States he did not feel nauseous but states his mouth would just start watering and then he would vomit.  Denies any abdominal pain.  No chest pain or shortness of breath.  No cough or congestion.  Patient denies any trouble with his vision.  Patient states over time the headache has improved upon arrival to the emergency department but still present.  Patient states he is not currently nauseous.    Ddx: Metabolic, dehydration, anemia, infection, intracranial pathology, other    Initially started with CBC and chemistry.  No acute findings are noted.  Therefore CT brain obtained.  Patient's  symptoms were treated with ODT Zofran.  CT brain read by radiologist showing no acute findings.  At this point I reevaluated the patient he has near complete resolution of his symptoms.  I did offer additional testing but patient declined.  I did offer admission or to stay in the emergency department for a longer period of time to continue monitoring but at this point patient states he is feeling much better and has Zofran at home.  Patient requesting discharge.  Therefore patient will be discharged he is encouraged to return with any worsening symptoms or concerns.  Otherwise follow-up with family care provider in the next few days.  Patient discharged home in improved stable condition    Amount and/or Complexity of Data Reviewed  Labs: ordered. Decision-making details documented in ED Course.  Radiology: ordered and independent interpretation performed. Decision-making details documented in ED Course.    Risk  OTC drugs.  Prescription drug management.  Diagnosis or treatment significantly limited by social determinants of health.        Procedure  Procedures     Salma Mccoy PA-C  01/07/25 3932

## 2025-01-09 ENCOUNTER — HOSPITAL ENCOUNTER (OUTPATIENT)
Dept: RESPIRATORY THERAPY | Facility: HOSPITAL | Age: 45
Discharge: HOME | End: 2025-01-09
Payer: MEDICARE

## 2025-01-09 ENCOUNTER — SOCIAL WORK (OUTPATIENT)
Dept: PEDIATRIC PULMONOLOGY | Facility: HOSPITAL | Age: 45
End: 2025-01-09
Payer: MEDICARE

## 2025-01-09 ENCOUNTER — MULTIDISCIPLINARY VISIT (OUTPATIENT)
Dept: PEDIATRIC PULMONOLOGY | Facility: HOSPITAL | Age: 45
End: 2025-01-09
Payer: MEDICARE

## 2025-01-09 ENCOUNTER — ALLIED HEALTH (OUTPATIENT)
Dept: PEDIATRIC PULMONOLOGY | Facility: HOSPITAL | Age: 45
End: 2025-01-09

## 2025-01-09 ENCOUNTER — SPECIALTY PHARMACY (OUTPATIENT)
Dept: PHARMACY | Facility: CLINIC | Age: 45
End: 2025-01-09

## 2025-01-09 VITALS
TEMPERATURE: 97.9 F | OXYGEN SATURATION: 95 % | DIASTOLIC BLOOD PRESSURE: 59 MMHG | SYSTOLIC BLOOD PRESSURE: 111 MMHG | HEART RATE: 53 BPM | RESPIRATION RATE: 16 BRPM

## 2025-01-09 DIAGNOSIS — E84.9 CYSTIC FIBROSIS: Primary | ICD-10-CM

## 2025-01-09 DIAGNOSIS — N20.0 KIDNEY STONE: ICD-10-CM

## 2025-01-09 DIAGNOSIS — D22.5 MELANOCYTIC NEVUS OF TRUNK: ICD-10-CM

## 2025-01-09 DIAGNOSIS — Z00.6 RESEARCH STUDY PATIENT: Primary | ICD-10-CM

## 2025-01-09 DIAGNOSIS — E84.9 CF (CYSTIC FIBROSIS) (MULTI): ICD-10-CM

## 2025-01-09 DIAGNOSIS — N20.1 OBSTRUCTION OF LEFT URETEROPELVIC JUNCTION (UPJ) DUE TO STONE: ICD-10-CM

## 2025-01-09 DIAGNOSIS — E84.9 CYSTIC FIBROSIS: ICD-10-CM

## 2025-01-09 DIAGNOSIS — E84.8 EXOCRINE PANCREATIC MANIFESTATION OF CYSTIC FIBROSIS (MULTI): ICD-10-CM

## 2025-01-09 LAB
FEF 25-75: 2.31 L/S
FEV1/FVC: 68 %
FEV1: 3.3 LITERS
FVC: 4.84 LITERS
PEF: 5.77 L/S

## 2025-01-09 PROCEDURE — G2211 COMPLEX E/M VISIT ADD ON: HCPCS | Performed by: INTERNAL MEDICINE

## 2025-01-09 PROCEDURE — 99213 OFFICE O/P EST LOW 20 MIN: CPT | Performed by: INTERNAL MEDICINE

## 2025-01-09 PROCEDURE — 87070 CULTURE OTHR SPECIMN AEROBIC: CPT | Performed by: INTERNAL MEDICINE

## 2025-01-09 RX ORDER — POTASSIUM CITRATE 10 MEQ/1
10 TABLET, EXTENDED RELEASE ORAL
Qty: 60 TABLET | Refills: 11 | Status: SHIPPED | OUTPATIENT
Start: 2025-01-09 | End: 2026-01-04

## 2025-01-09 ASSESSMENT — PATIENT HEALTH QUESTIONNAIRE - PHQ9
9. THOUGHTS THAT YOU WOULD BE BETTER OFF DEAD, OR OF HURTING YOURSELF: NOT AT ALL
5. POOR APPETITE OR OVEREATING: SEVERAL DAYS
8. MOVING OR SPEAKING SO SLOWLY THAT OTHER PEOPLE COULD HAVE NOTICED. OR THE OPPOSITE, BEING SO FIGETY OR RESTLESS THAT YOU HAVE BEEN MOVING AROUND A LOT MORE THAN USUAL: NOT AT ALL
1. LITTLE INTEREST OR PLEASURE IN DOING THINGS: NOT AT ALL
7. TROUBLE CONCENTRATING ON THINGS, SUCH AS READING THE NEWSPAPER OR WATCHING TELEVISION: NOT AT ALL
6. FEELING BAD ABOUT YOURSELF - OR THAT YOU ARE A FAILURE OR HAVE LET YOURSELF OR YOUR FAMILY DOWN: NOT AT ALL
3. TROUBLE FALLING OR STAYING ASLEEP OR SLEEPING TOO MUCH: NOT AT ALL
2. FEELING DOWN, DEPRESSED OR HOPELESS: NOT AT ALL
4. FEELING TIRED OR HAVING LITTLE ENERGY: NOT AT ALL

## 2025-01-09 ASSESSMENT — ANXIETY QUESTIONNAIRES
7. FEELING AFRAID AS IF SOMETHING AWFUL MIGHT HAPPEN: NOT AT ALL
4. TROUBLE RELAXING: NOT AT ALL
5. BEING SO RESTLESS THAT IT IS HARD TO SIT STILL: NOT AT ALL
GAD7 TOTAL SCORE: 0
3. WORRYING TOO MUCH ABOUT DIFFERENT THINGS: NOT AT ALL
2. NOT BEING ABLE TO STOP OR CONTROL WORRYING: NOT AT ALL
1. FEELING NERVOUS, ANXIOUS, OR ON EDGE: NOT AT ALL
6. BECOMING EASILY ANNOYED OR IRRITABLE: NOT AT ALL

## 2025-01-09 NOTE — PROGRESS NOTES
[facility] SPECIALTY PHARMACY PATIENT REASSESSMENT NOTE:  CYSTIC FIBROSIS    Froy Parisi is a 44 y.o. male seen in clinic .    [facility] Supplied Medication::  Tonya Parisi's care will be continued with the referring prescriber.     GENERAL ASSESSMENT:  Are there any changes to your home medications, OTCs or supplements? No changes reported    Current Outpatient Medications on File Prior to Visit   Medication Sig Dispense Refill    blood sugar diagnostic (OneTouch Verio test strips) strip Test blood sugar fasting, 2 hours after your largest meal, and as needed (Patient not taking: Reported on 1/7/2025)      calcium citrate-vitamin D3 (Citracal+D) 315 mg-5 mcg (200 unit) tablet Take 2 tablets by mouth once daily. 180 tablet 3    elexacaftor-tezacaftor-ivacaft (Trikafta) 100-50-75 mg tablet TAKE TWO (2) ORANGE TABLETS BY MOUTH IN THE MORNING AND ONE (1) BLUE TABLET BY MOUTH IN THE EVENING. TAKE 12 HOURS APART WITH FATTY FOODS. 84 each 3    lipase-protease-amylase (Zenpep) 20,000-63,000- 84,000 unit capsule Take 6 capsules by mouth 3 times a day with meals. May also take 3 capsules with snacks for snacks. (Patient taking differently: Take 6 capsules by mouth 3 times a day with meals. May also take 3 capsules with snacks for snacks.   TAKING TID) 540 capsule 11    omeprazole (PriLOSEC) 40 mg DR capsule Take 1 capsule (40 mg) by mouth once daily in the morning. Take before meals. 90 capsule 3    OneTouch Delica Plus Lancet 33 gauge misc  (Patient not taking: Reported on 1/7/2025)      phenazopyridine (Pyridium) 200 mg tablet Take 1 tablet (200 mg) by mouth 3 times a day as needed for bladder spasms. 30 tablet 0    potassium citrate CR (Urocit-K 10) 10 mEq ER tablet Take 1 tablet (10 mEq) by mouth 2 times daily (morning and late afternoon). Do not crush, chew, or split. 60 tablet 11    [DISCONTINUED] ciprofloxacin (Cipro) 500 mg tablet Take 1 tablet (500 mg) by mouth 2 times a day for 3 days. 6  tablet 0    [DISCONTINUED] HYDROcodone-acetaminophen (Norco) 5-325 mg tablet Take 1 tablet by mouth every 6 hours if needed for severe pain (7 - 10). 20 tablet 0    [DISCONTINUED] HYDROcodone-acetaminophen (Norco) 5-325 mg tablet Take 1 tablet by mouth every 6 hours if needed for severe pain (7 - 10). 20 tablet 0    [DISCONTINUED] phenazopyridine (Pyridium) 200 mg tablet Take 1 tablet (200 mg) by mouth 3 times a day as needed for bladder spasms. 30 tablet 0     No current facility-administered medications on file prior to visit.       Do you have any new allergies? no new allergies reported    Allergies as of 01/09/2025    (No Known Allergies)       Have you been diagnosed with any new medical conditions?  sick last couple months, decyulissa had sinus infection, kidney stone    Patient Active Problem List   Diagnosis    Cystic fibrosis with pulmonary manifestations (Multi)    Exocrine pancreatic manifestation of cystic fibrosis (Multi)    GERD (gastroesophageal reflux disease)    Sensorineural hearing loss (SNHL) of left ear with restricted hearing of right ear    Bleeding diathesis due to vitamin K deficiency (Multi)    Healthcare maintenance    Osteopenia    Vitamin D deficiency    Impaired glucose tolerance    Pseudomonas aeruginosa infection    Melanocytic nevus of trunk    Difficulty maintaining weight    Diabetes mellitus related to CF (cystic fibrosis) (Multi)    Cystic fibrosis    Tubular adenoma of colon    Abnormal finding of diagnostic imaging    Abnormal glucose tolerance test (GTT)    Abnormal magnetic resonance imaging of head    Arthralgia of hip    Chronic obstructive pulmonary disease (Multi)    Exocrine pancreatic insufficiency (HHS-HCC)    Low bone density    Pain in left hip    Pleurodynia    Vitamin D insufficiency    Cystic fibrosis with gastrointestinal manifestations    Flu-like symptoms    Obstruction of left ureteropelvic junction (UPJ) due to stone    Calculus of ureter    Kidney stone     Type 2 diabetes mellitus, with long-term current use of insulin       CFTR Genotype: :F508 del / 1898+1G->A  Current CFTR Modulator: Trikafta  Dose: 2 orange tablets in the morning and 1 blue tablet in the evening with fat containing food      TOLERANCE:   Have you experienced any side effects from this medication? Side effects reported include:    dry eyes     Are there any changes to current therapy regimen? No changes reported    EFFICACY:    Have you developed any new symptoms of your condition? No changes reported    How do you feel your medication is affecting your disease state? Not coughing, feeling healthy    GOALS:  Your goals of therapy are: Improved quality of life, Improve/maintain lung function, and Reduce frequency of illness    COMPLIANCE / ADHERENCE:  Have you had any unplanned missed doses?Yes  If yes, how often do you miss doses and is there a particular contributing reason? Misses night dose once a week    What barriers to adherence does the patient have? (Answer Y/N for all):  Patient has a difficult time remembering to take medications? No  Difficult administration technique?No  Medication cost? No  Patient does not think medication is beneficial?No  Other?   What actions were taken to address barriers?    Does the patient have any barriers to self-administration (including physical and mental)? No  List barriers:   Describe actions taken to mitigate barriers:    Labs  Lab Results   Component Value Date    WBC 11.3 01/07/2025    HGB 13.2 (L) 01/07/2025    HCT 39.9 (L) 01/07/2025     01/07/2025    CHOL 155 03/11/2024    TRIG 128 03/11/2024    HDL 57.7 03/11/2024    ALT 33 01/07/2025    AST 21 01/07/2025     01/07/2025    K 4.0 01/07/2025     01/07/2025    CREATININE 0.97 01/07/2025    BUN 19 01/07/2025    CO2 30 01/07/2025    TSH 0.71 09/12/2022    INR 1.0 09/12/2022    HGBA1C 5.8 08/15/2024       Pulmonary Function Tests     FEV1   Date/Time Value Ref Range Status    01/09/2025 02:00 PM 3.30 liters      Comment:     83%   09/09/2024 09:43 AM 3.40 liters      Comment:     85%   07/08/2024 11:16 AM 3.64 liters Final     Comment:     91%          PATIENT MANAGEMENT:    Do you have any questions regarding your medications, or care? additional questions: Discuss Alyftrek    Do you have any concerns with access to your medications? No concerns expressed    How would you classify your Quality of Life? Doing well 8    Fills medications at:  [facility]ty and [facility]    How would you describe your satisfaction with  Specialty Pharmacy? Satisfied 10    Do you have any additional suggestions to improve  Specialty Pharmacy services: n/a    IMPRESSION/PLAN:  Is patient high risk (potential patients:  pregnancy, geriatric, pediatric)?  n/a  Is laboratory follow-up needed? Liver function tests due annually. Last done: Jan 2025  Is a clinical intervention needed? yes  Continue therapies    Additional comments:   -Zenpep monthly - needs refill  -Omeprazole refill needed  -DEKAs Plus    -Alyftrek [facility] Specialty Pharmacy Clinical Note  Initial Patient Education : Alyftrek (vanzacaftor/tezacaftor/deutivacaftor)    Introduction  Froy Parisi is a 44 y.o. male who is on the specialty pharmacy service for management of: Cystic Fibrosis    Date of education: 01/13/25 - education done 1/9/25    The most recent encounter visit with the referring prescriber Duke Chatman MD on 01/13/25 was reviewed.  Pharmacy will continue to collaborate in the care of this patient with the referring prescriber.    Clinical Background  An initial assessment will be conducted prior to first fill of the medication to determine the appropriateness of therapy given the patient's diagnosis, medication list, comorbidities, allergies, medical history, patient's ability to self administer medication, and therapeutic goals based on possible outcomes of therapy.     Baseline Labs for clinical  appropriateness that were reviewed include:   Cystic Fibrosis - LFTs and bilirubin:   Lab Results   Component Value Date    ALT 33 01/07/2025    AST 21 01/07/2025    BILITOT 0.7 01/07/2025        Package insert recommended follow up after beginning therapy:  -Hepatic function panel monthly x 6 months, then every 3 months x 6 months, then annually  -Pediatric patients: Annual eye exam    Education/Discussion  Froy was contacted on 1/13/2025 at 3:52 PM for a pharmacy visit with encounter number [phone] from:   [facility]  [address] [place] [id number]  Dept: [phone]  Dept Fax: [phone]  Froy consented to a/an In person visit, which was performed.    Medication receipt date: n/a - medication has not yet been ordered  Medication Start Date (planned or actual):  when medication is available  Education was conducted prior to start of therapy? Yes  Duration of therapy: Maintenance    Education discussed includes the following:     Additional details of the medication specific counseling are found within the linked patient education flowsheet.       Clinical Summary:  - CFTR Mutations: [list mutations]    M392lax / 1898+1g->A;       - The Patient's medication list, allergies, and comorbid conditions were verified. No contraindications to therapy noted at this time.  - Patient advised to contact the pharmacy if there are any changes to the medication list, including prescriptions, OTC medications, herbal products, or supplements.   - Current clinically significant drug-drug interactions include:   Strong CYP3A inducers should not be used, Moderate to strong inhibitors of CYP3A necessitate dose adjustment    -Medication is clinically appropriate.    Education/Discussion:  Patient accepted the pharmacist's offer of counseling.    Indication: Cystic Fibrosis treatment    Alyftrek (vanzacaftor/tezacaftor/deutivacaftor)  Vanzacaftor 10mg/ tezacaftor 50mg/ deutivacaftor  125mg tablets: Take 2 tablets at one time every day    Mechanism of action: CFTR Modulator    Administration:   Dietary considerations:  Take with fat-containing food or drink    Does the patient have any barriers to self-administration (including physical and mental)? No     List barriers:      Describe actions taken to mitigate barriers:     Patient/caregiver counseled on proper technique for self-administration: Yes    Missed doses:  If 6 hours or less have passed since the missed dose, take the missed dose as soon as possible and continue on the original schedule.    If more than 6 hours has passed since the missed dose, skip the missed dose, and continue on the original schedule the next day.    Warnings, Precautions, and Adverse Reactions:   - Discussed common adverse effects, warnings and precautions pertinent to the medication including but not limited to: headache, upper respiratory tract infection symptoms, stomach pain, diarrhea, rash, nasal congestion, and increases in liver enzymes  - Patient was encouraged to reach out to their doctor's office if they develop: any severe side effects such as head-to-toe rash      Handling and Storage   Store in the original container at room temperature in a cool dry place.     Disposal:  Unused,  or damaged doses should be properly disposed      The follow up timeline was discussed. Every person responds to and reacts to therapy differently. Patient should be assessed for efficacy and tolerability at approximately: next CF clinic visit and seen by CF pharmacist at least annually     Provided education on goals and possible outcomes of therapy:  Adherence with therapy  Timely completion of appropriate labs  Timely and appropriate follow up with provider  Identify and address medication interactions with presciption medications, OTC medications and supplements  Optimize or maintain quality of life  Cystic Fibrosis: Improve/Maintain lung function  Reduce frequency  of illness    The importance of adherence was discussed and they were advised to take the medication as prescribed by their provider.     Impression/Plan       This patient has not been identified as high risk   The following action was taken: N/A.      Patient expressed interest in starting Alyftrek and expressed understanding of the educational points. Paperwork will be started.        Alyftrek is a specialty medication, and insurance often dictates where it can be filled. If you are able to choose to fill with  Specialty, see below for Welcome Packet information:    The  Specialty Pharmacy Welcome packet may be viewed here:  [email or web address]       Or by scanning QR code:      Provided contact information ([phone]) for [facility] and reviewed dispensing process, refill timeline and patient management follow up. Advised to contact the pharmacy if there are any adverse effects and/or changes to medication list, including prescriptions, OTC medications, herbal products, or supplements. Confirmed understanding of education conducted during assessment. All questions and concerns were addressed and patient was encouraged to reach out for additional questions or concerns.      Nadia Malik, PharmD  01/13/25  3:52 PM    Nadia Malik, PharmD   1/9/2025 3:58 PM

## 2025-01-09 NOTE — PROGRESS NOTES
Subjective   Patient ID: Froy Parisi is a 44 y.o. male who presents for Cystic Fibrosis in outpatient clinic.    Social Work Visit Type: This is an Annual Visit for Froy Parisi  [facility]    Identifying Information                              : 1980  Assessment date: 2025    Objective     Patient accompanied by:  partner and grandkids    Family System / Parents:    Raised by:  with biological parent(s), step mom ()  Mother:  Name:  Fern   Education: Highest Level of Education: High School  Father:  Name:  Froy ()  Education: Highest Level of Education: Less than High School  Siblings & Children Information:  # of step-children: 2    Relationships / Social History:  Patient lives with: patient lives with Gauri, goes back and forth to [place]. Has house together in [place]. Also lives with Gauri's mom, brother-in-law and brother-in-law's son  Number of people in the house:, including patient: 5  Relationship status: Relationship Status / Family Dynamic: In a Relationship: Yes  How Long?  8 years   Marital/relationship status:  no relationship issues at this time  Does patient have adequate housing?:Owns home  Does patient feel safe in home?: Yes  Supportive Relationships: spouse/partner    Education:  Education: Highest Level of Education: Some College  Employer information: Disabled    Income / Insurance:  Total Household Income:  $10k-$19k/year  Insurance Options:  Medicare .   Secondary Insurance Options : Medicaid: .  [facility]  If 26 or under: Was patient covered under parents' health insurance plan? N/A  Did the patient receive free medicine or co-pay assistance from a Patient Assistance Program? No    Disability  Are you on any form of disability? yes  What type? SSDI  What is the status? active    Adherence and Understanding of Health:  Knowledge of health:  good, Overall adherence:  good, Adherence with medications:  good, Managed by: patient, Understanding of  "medication:  good, and Adherence with appointments:   good    Cognition/Mood/Affect:  The patient was neatly groomed, appropriately dressed and adequately nourished.  What are the patient's psychosocial stressors:  reports nothing  How has your mood been lately?: \"good\"  How do you manage stress?: be alone, go on walks, vape pen and exercise  What are your goals for this year?: \"keep on living\"  What about having CF do you find challenging?  Wish made more plans for the future     Thought Processes   Organized?  yes  Have you ever been hospitalized in a psychiatric hospital? no  Do you currently or have you ever seen a therapist/counselor/psychiatrist? yes  *If yes, indicate name/contact information: around 2007 when dad passed away  Have you used medications for mental health issues, sleep and/or pain now or in the past?  no    Substance Use:  Cannabis - vape pen. Occasional beer, every few months.     Advance Directives  Do you have an advance directive/living will?  yes  power of  for healthcare on file     Strengths:  supportive partner, knowledge of health  What are the patient's coping behaviors?  Vape pen, exercise     Barriers:  none reported     Impression:  Patient's 3yo grandson is in remission with cancer dx. Gauri going back and forth to [place].  SW engaged in active listening and supportive counseling. SW facilitated the expression of thoughts and feeling related to the issues noted above. SW reviewed contact information and availability for on-going support in between CF clinic appointments.      SW participated in a CF clinic huddle during which time the patients care plan, treatment needs and issues were discussed. The information noted above has been shared with the CF team during CF clinic.     PHQ9: 1  GAD7: 0    Plan:    On-going psychosocial and emotional support, supportive counseling and referrals as indicated to maximize adjustment to living with cystic fibrosis.    Diana" Jeanna Knowles, ARASHW  January 9, 2025

## 2025-01-09 NOTE — PROGRESS NOTES
Respiratory Note:  Patient's Airway Clearance: AffloVest  Frequency: PRN when sick  Exercise: Goes to [employer] does strength training   Oscillating Device: Flutter PRN when sick  Nassar Cough:  PRN when sick  Primary: AffloVest  Secondary: Exercise, Flutter, Nassar Cough    Aerosols: Name of medication, frequency, type of nebulizer used, Mask or Mouthpiece?  Bronchodilators: *No Inhaled Medications at this time    Spacer: Yes  Compressor: Has a working nebulizer compressor  Home PFT Device: SHARI Spirobank  Frequency: Not currently using    Method of Cleaning Neb Cups: N/A  Method of Cleaning PFT Device: N/A    Pharmacy for respiratory meds: [facility] in [facility]  Patient Assistance Program:  Oxygen: No  Home Care Company:  LPM:  Continuous, nocturnal, PRN, intermittent w/exacerbation:   CPAP, BIPAP, Assisted Breathing (use at home or in-patient): No  Have you smoked cigarettes in the last year?: No  Are you exposed to second hand smoke or electronic cigarette vapor?: No  Does anyone in your household smoke cigarettes?: No  Did this patient use electronic cigarettes (vape) this year? No  During the reporting year, how often was this patient vaping? Not at all

## 2025-01-09 NOTE — ASSESSMENT & PLAN NOTE
Dermatology following annually.  Missed appointment with Dr. Pacheco (dermatology) last month.  Needs to reschedule.

## 2025-01-09 NOTE — PATIENT INSTRUCTIONS
"    It was very nice to see you today. We can offer you in-person and \"virtual\" appointments with your cystic fibrosis provider.    If you need to cancel or schedule an appointment, please call the  at the [facility] at [phone] between the hours of 8 AM and 5 PM, Monday-Friday.    To reach the CF nurse, Abby, please call [phone]. Abby has a secure voice mail account if you want to leave a message.    If you need to speak with an adult cystic fibrosis provider between the hours of 5 PM and 8 AM (overnight) or anytime on Saturday or Sunday, please call [phone]. When you call this number, you will be connected to an  with the [facility] answering service. You should tell the  that you're an adult with cystic fibrosis who needs to speak to the \"cystic fibrosis doctor on-call.\" The  will take your contact information. You should then expect a call back from the cystic fibrosis doctor on-call within a short period of time.      Important Information:  Your CFTR variants (mutations) are: F508 del / 1898+1G->A    Your percent-predicted FEV1 today was: 83%    Your weight adjusted for height (body mass index, BMI) today was: 21.4  (goal for adult males with CF = 23.0 kg/m2, goal for adult females with CF = 22.0 kg/m2)    Sick plan (please call our office if you think you need to take antibiotics or be admitted to the hospital):   Ciprofloxacin 750 mg by mouth twice daily for 14 days.     OK to not take the ciprofloxacin because you've been taking the Augmentin.  Finish up the Augmentin.  Start potassium citrate (Urocit-K) to increase the pH of the urine.  This will reduce chances of calcium oxalate crystals from generating another kidney stone.  Keep doing the Boost supplements to help regain weight.    Next appointment: March 2025  "

## 2025-01-12 LAB — BACTERIA SPT CF RESP CULT: NORMAL

## 2025-01-12 NOTE — ASSESSMENT & PLAN NOTE
Lung function down a bit from baseline today.  Question whether he had a bit of an exacerbation triggered by general anesthesia.  Will finish Augmentin course.  Continue Trikafta for now.  Expressed interest in switching to Alyftrek.  Not doing aerosols or chest physiotherapy.  Surveillance culture obtained today.  Sick plan: If recent Pseudomonas, ciprofloxacin 750 mg by mouth twice daily x 14 days; otherwise, if MSSA, Bactrim DS, 2 tablets by mouth twice daily x 14 days.

## 2025-01-12 NOTE — ASSESSMENT & PLAN NOTE
Start Urocit-K 10 mEq twice daily for secondary prevention of (likely) calcium oxalate kidney stones.  Check urine pH at next appointment.  Question alkalinization.

## 2025-01-12 NOTE — ASSESSMENT & PLAN NOTE
Stent removed, doing well.  I told Froy he can simply finish up the Augmentin prescribed by ED in [place] and not to start the ciprofloxacin.

## 2025-01-13 RX ORDER — OMEPRAZOLE 40 MG/1
40 CAPSULE, DELAYED RELEASE ORAL
Qty: 90 CAPSULE | Refills: 3 | Status: SHIPPED | OUTPATIENT
Start: 2025-01-13 | End: 2026-01-13

## 2025-01-13 RX ORDER — PEDI MULTIVIT NO.128/VITAMIN K 500 MCG/ML
2 LIQUID (ML) ORAL DAILY
Qty: 60 CAPSULE | Refills: 11 | Status: SHIPPED | OUTPATIENT
Start: 2025-01-13 | End: 2025-02-12

## 2025-01-13 RX ORDER — PANCRELIPASE LIPASE, PANCRELIPASE PROTEASE, PANCRELIPASE AMYLASE 20000; 63000; 84000 [USP'U]/1; [USP'U]/1; [USP'U]/1
CAPSULE, DELAYED RELEASE ORAL
Qty: 540 CAPSULE | Refills: 11 | Status: SHIPPED | OUTPATIENT
Start: 2025-01-13 | End: 2026-01-13

## 2025-01-22 ENCOUNTER — SPECIALTY PHARMACY (OUTPATIENT)
Dept: PHARMACY | Facility: CLINIC | Age: 45
End: 2025-01-22

## 2025-01-22 DIAGNOSIS — E84.9 CYSTIC FIBROSIS: Primary | ICD-10-CM

## 2025-01-22 RX ORDER — VANZACAFTOR, TEZACAFTOR, AND DEUTIVACAFTOR 125; 50; 10 MG/1; MG/1; MG/1
2 TABLET, FILM COATED ORAL DAILY
Qty: 56 TABLET | Refills: 6 | Status: SHIPPED | OUTPATIENT
Start: 2025-01-22

## 2025-01-23 ENCOUNTER — SPECIALTY PHARMACY (OUTPATIENT)
Dept: PHARMACY | Facility: CLINIC | Age: 45
End: 2025-01-23

## 2025-01-27 ENCOUNTER — SPECIALTY PHARMACY (OUTPATIENT)
Dept: PHARMACY | Facility: CLINIC | Age: 45
End: 2025-01-27

## 2025-01-27 PROCEDURE — RXMED WILLOW AMBULATORY MEDICATION CHARGE

## 2025-01-30 ENCOUNTER — SPECIALTY PHARMACY (OUTPATIENT)
Dept: PHARMACY | Facility: CLINIC | Age: 45
End: 2025-01-30

## 2025-01-31 ENCOUNTER — PHARMACY VISIT (OUTPATIENT)
Dept: PHARMACY | Facility: CLINIC | Age: 45
End: 2025-01-31
Payer: COMMERCIAL

## 2025-02-24 ENCOUNTER — SPECIALTY PHARMACY (OUTPATIENT)
Dept: PHARMACY | Facility: CLINIC | Age: 45
End: 2025-02-24

## 2025-02-24 PROCEDURE — RXMED WILLOW AMBULATORY MEDICATION CHARGE

## 2025-02-27 ENCOUNTER — PHARMACY VISIT (OUTPATIENT)
Dept: PHARMACY | Facility: CLINIC | Age: 45
End: 2025-02-27
Payer: COMMERCIAL

## 2025-03-07 DIAGNOSIS — E84.9 CYSTIC FIBROSIS: ICD-10-CM

## 2025-03-07 NOTE — PROGRESS NOTES
Patient started a medication (Alyftrek) that requires monthly HFP for 6 months. Lab order placed.

## 2025-03-21 ENCOUNTER — SPECIALTY PHARMACY (OUTPATIENT)
Dept: PHARMACY | Facility: CLINIC | Age: 45
End: 2025-03-21

## 2025-03-21 PROCEDURE — RXMED WILLOW AMBULATORY MEDICATION CHARGE

## 2025-03-26 ENCOUNTER — APPOINTMENT (OUTPATIENT)
Dept: RESPIRATORY THERAPY | Facility: HOSPITAL | Age: 45
End: 2025-03-26
Payer: MEDICARE

## 2025-03-31 ENCOUNTER — APPOINTMENT (OUTPATIENT)
Dept: RESPIRATORY THERAPY | Facility: HOSPITAL | Age: 45
End: 2025-03-31
Payer: MEDICARE

## 2025-03-31 ENCOUNTER — PHARMACY VISIT (OUTPATIENT)
Dept: PHARMACY | Facility: CLINIC | Age: 45
End: 2025-03-31
Payer: COMMERCIAL

## 2025-04-02 ENCOUNTER — HOSPITAL ENCOUNTER (OUTPATIENT)
Dept: RESPIRATORY THERAPY | Facility: HOSPITAL | Age: 45
Discharge: HOME | End: 2025-04-02
Payer: MEDICARE

## 2025-04-02 ENCOUNTER — HOSPITAL ENCOUNTER (OUTPATIENT)
Dept: RESEARCH | Facility: HOSPITAL | Age: 45
Discharge: HOME | End: 2025-04-02
Payer: MEDICARE

## 2025-04-02 ENCOUNTER — TRANSCRIBE ORDERS (OUTPATIENT)
Dept: RESPIRATORY THERAPY | Facility: HOSPITAL | Age: 45
End: 2025-04-02
Payer: MEDICARE

## 2025-04-02 DIAGNOSIS — E84.9 CF (CYSTIC FIBROSIS) (MULTI): Primary | ICD-10-CM

## 2025-04-02 DIAGNOSIS — Z00.6 RESEARCH STUDY PATIENT: ICD-10-CM

## 2025-04-02 DIAGNOSIS — Z00.6 RESEARCH SUBJECT: ICD-10-CM

## 2025-04-04 DIAGNOSIS — E84.9 CYSTIC FIBROSIS: ICD-10-CM

## 2025-04-15 ENCOUNTER — APPOINTMENT (OUTPATIENT)
Dept: DERMATOLOGY | Facility: CLINIC | Age: 45
End: 2025-04-15
Payer: MEDICARE

## 2025-04-23 ENCOUNTER — NUTRITION (OUTPATIENT)
Dept: PEDIATRIC PULMONOLOGY | Facility: HOSPITAL | Age: 45
End: 2025-04-23

## 2025-04-23 ENCOUNTER — MULTIDISCIPLINARY VISIT (OUTPATIENT)
Dept: PEDIATRIC PULMONOLOGY | Facility: HOSPITAL | Age: 45
End: 2025-04-23
Payer: MEDICARE

## 2025-04-23 ENCOUNTER — HOSPITAL ENCOUNTER (OUTPATIENT)
Dept: RESPIRATORY THERAPY | Facility: HOSPITAL | Age: 45
Discharge: HOME | End: 2025-04-23
Payer: MEDICARE

## 2025-04-23 ENCOUNTER — DOCUMENTATION (OUTPATIENT)
Dept: PHYSICAL THERAPY | Facility: HOSPITAL | Age: 45
End: 2025-04-23

## 2025-04-23 ENCOUNTER — TRANSCRIBE ORDERS (OUTPATIENT)
Dept: PEDIATRIC PULMONOLOGY | Facility: HOSPITAL | Age: 45
End: 2025-04-23

## 2025-04-23 ENCOUNTER — SPECIALTY PHARMACY (OUTPATIENT)
Dept: PHARMACY | Facility: HOSPITAL | Age: 45
End: 2025-04-23

## 2025-04-23 VITALS
SYSTOLIC BLOOD PRESSURE: 108 MMHG | RESPIRATION RATE: 20 BRPM | OXYGEN SATURATION: 95 % | TEMPERATURE: 98.3 F | DIASTOLIC BLOOD PRESSURE: 71 MMHG | HEART RATE: 71 BPM | HEIGHT: 69 IN | WEIGHT: 150.35 LBS | BODY MASS INDEX: 22.27 KG/M2

## 2025-04-23 DIAGNOSIS — R73.09 ABNORMAL GLUCOSE TOLERANCE TEST (GTT): ICD-10-CM

## 2025-04-23 DIAGNOSIS — E55.9 VITAMIN D DEFICIENCY: ICD-10-CM

## 2025-04-23 DIAGNOSIS — E84.9 CYSTIC FIBROSIS: Primary | ICD-10-CM

## 2025-04-23 DIAGNOSIS — R73.02 IMPAIRED GLUCOSE TOLERANCE: ICD-10-CM

## 2025-04-23 DIAGNOSIS — N20.1 CALCULUS OF URETER: ICD-10-CM

## 2025-04-23 DIAGNOSIS — E84.9 CYSTIC FIBROSIS: ICD-10-CM

## 2025-04-23 DIAGNOSIS — Z79.899 LONG-TERM CURRENT USE OF PROTON PUMP INHIBITOR THERAPY: ICD-10-CM

## 2025-04-23 DIAGNOSIS — D22.5 MELANOCYTIC NEVUS OF TRUNK: ICD-10-CM

## 2025-04-23 DIAGNOSIS — K21.9 GASTROESOPHAGEAL REFLUX DISEASE WITHOUT ESOPHAGITIS: ICD-10-CM

## 2025-04-23 DIAGNOSIS — E84.9 CF (CYSTIC FIBROSIS) (MULTI): ICD-10-CM

## 2025-04-23 DIAGNOSIS — K86.81 EXOCRINE PANCREATIC INSUFFICIENCY (HHS-HCC): ICD-10-CM

## 2025-04-23 LAB
MGC ASCENT PFT - FEV1 - PRE: 3.4
MGC ASCENT PFT - FEV1 - PREDICTED: 3.75
MGC ASCENT PFT - FVC - PRE: 4.95
MGC ASCENT PFT - FVC - PREDICTED: 4.62

## 2025-04-23 PROCEDURE — 99214 OFFICE O/P EST MOD 30 MIN: CPT | Performed by: INTERNAL MEDICINE

## 2025-04-23 PROCEDURE — 94010 BREATHING CAPACITY TEST: CPT

## 2025-04-23 PROCEDURE — 87070 CULTURE OTHR SPECIMN AEROBIC: CPT | Performed by: INTERNAL MEDICINE

## 2025-04-23 PROCEDURE — 94010 BREATHING CAPACITY TEST: CPT | Performed by: INTERNAL MEDICINE

## 2025-04-23 RX ORDER — ALBUTEROL SULFATE 90 UG/1
INHALANT RESPIRATORY (INHALATION)
COMMUNITY
Start: 2024-12-30 | End: 2025-04-23 | Stop reason: ALTCHOICE

## 2025-04-23 SDOH — ECONOMIC STABILITY: FOOD INSECURITY: WITHIN THE PAST 12 MONTHS, THE FOOD YOU BOUGHT JUST DIDN'T LAST AND YOU DIDN'T HAVE MONEY TO GET MORE.: NEVER TRUE

## 2025-04-23 SDOH — ECONOMIC STABILITY: FOOD INSECURITY: WITHIN THE PAST 12 MONTHS, YOU WORRIED THAT YOUR FOOD WOULD RUN OUT BEFORE YOU GOT MONEY TO BUY MORE.: NEVER TRUE

## 2025-04-23 NOTE — PROGRESS NOTES
Tristin Sutherland M.D. 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 Pre-Trikafta ppFEV1 in mid 60% decile. Post Trikafta ppFEV1 in the mid 80% decile. Last hospitalization and in August 2018. Historically, difficulty maintaining adequate BMI, had feeding tube placed in his 20's, removed on 6/21/2024. Pre-Trikafta BMI 17-19 kg/m². Post Trikafta BMI stable at around 22.5 kg/m². Last Pseudomonas aeruginosa (November 2016). Last MRSA (March 2016). Occasional Aspergillus fumigatus.     CF complications: EPI on PERT; GERD controlled with omeprazole; suspicion for CF liver disease (Dr. Johnson, March 2023), wanted to get repeat RUQ ultrasound with duplex; osteopenia on DEXA in September 2022; (+) 2-hour OGTT on 3/27/2019 (baseline 97 mg/dL, 2-hour 219) but endocrinology considers him to have impaired glucose tolerance; started Alyftrek on or around 2/1/2025 according to pharmacy delivery date.    HPI (4/23/2025): Started Alyftrek about a month ago.  Enjoys daily dosing with Alyftrek.  He thinks that Alyftrek has been at least as good as Trikafta for respiratory health.  No mood disturbance.  He has not been sleeping quite as well recently, but he cannot strictly related that to initiation of Alyftrek.  Has really trimmed down his medication list.  He has been eating 4-5 meals per day; has gained some weight; drinks one protein supplement per day mixed with milk.  Stools have been harder since he has increased protein intake; no need for laxatives, though.  He has been running for aerobic exercise but limiting distance and intensity because he worries about losing weight due to calorie expenditure.  No recurrent symptoms referable to urolithiasis.  He never started taking the Urocit-K.  He said that Dr. Montelongo left the institution, so he needs to have urology follow-up.    HPI (1/9/2025):  ED visit on 12/7/2024 with  left-sided flank and groin pain; CT scan showed 5-6 mm long, 4-5 mm diameter obstructing left UPJ stone with mild hydronephrosis; discharged with oral ketorolac and followed up in clinic with Dr. Montelongo from urology on 12/9/2024; then had interval cystoscopy with STENT INSERTION (LEFT), cystoscopy with retrograde pyelogram (LEFT) on 12/10/2024 by Dr. Montelongo; scheduled for left-sided lithotripsy and stent removal on 1/7/2025.   Dr. Montelongo prescribed ciprofloxacin empirically for UTI prophylaxis, but he did not start taking.  On 12/30/2024, went to [place] [facility] ([facility]).  Note reviewed in Care Everywhere.  Presented with cough, chest congestion, fever.  Chest x-ray interpreted as showing possible left-sided pneumonia.  Negative viral PCR testing.  Afebrile in ED.  Normal WBC.  Urinalysis (+) for protein, ketones, blood, and leukocyte esterase.  Prescribed Augmentin for 10 days and a Medrol Dose-Stefan.   After anesthesia on 1/7/2025, had nausea and vomiting and headache.  Limited symptoms.  CT head negative.    Drinking two Boost supplements per day.  Feels good.  No fevers.  Stent removal was uneventful.      HPI (10/16/2024): Here with Gauri; Gastrocutaneous fistula repair with Dr. Dunlap on 8/19/2024.  Has gained some weight because he now does not need to avoid intake due to precipitating leakage from the fistula.  Has done well in the interim with only a single episode on 9/5/2024 of acute sinuitis treated with Augmentin 875-125 mg twice daily x 10 days, ending 9/15/2024.  Today, he feels like he recovered fully from that episode.    HPI (7/8/2024): PEG tube removed on 6/21/2024.  Has not been able to eat or drink to satiety due to leakage from around the PEG tube site.  Appetite intact, but weight down.  Denies steatorrhea.  Feeling good from lung perspective.      HPI (3/11/2024):  In-person visit today; no interval respiratory illnesses; still has some leaking around [place] button but much less than he had  with the mini-profile; not doing tube feeding but taking two Boost supplements per day. Resumed taking azithromycin daily after (+) throat swab culture for Pseudomonas fulva in September 2023. Not coughing. Does not expectorate sputum. No wheezing. He is not exercising regularly, but attributes to time limitation. If he misses omeprazole, notices that gastric juice that leaks around PEG tube burns. Interested in research.     HPI (12/6/2023):  Not doing tube feeding.  Tube leaks, would like to get a new button.  Trying to maintain and gain weight using oral supplements but problems obtaining them. No interval need for antibiotics.  Feels like his respiratory status is good. Not doing aerosols or chest physiotherapy.  Generally not coughing or expectorating sputum.  Denies any side effects associated with Trikafta.  Went to dermatology and had shave biopsy of pigmented lesion of lower back on left.    HPI (9/13/2023): First visit with Froy. Heretofore followed with Dr. Bravo, last visit on 6/12/2023. At that point, standard visit. No exacerbation. Doing tube feeding occasionally with Relizorb. Otherwise standard CF health measures. Since that time, he has done well without need for oral antibiotics. His girlfriend (Gauri) has a grandson in [place] with retinoblastoma, so they have been back and forth over past few months to support Gauri's son and daughter-in-law. Froy wonders whether he can/should have the VZV vaccine. He has also had a spot on skin of back bleed after scratching. Wants MD to take a look.     Current Use of Health Management Strategies:    Respiratory Interventions  Albuterol: Does not use  Pulmozyme: Does not use  Hypertonic saline: Does not use    HFCWO (percussive vest): Does not use  Oscillatory PEP: Does not use  Exercise: Weightlifting 2-3 times per week; now running about 1 mile per session.  LTE antagonist: No  Azithromycin: Does not use  Aztreonam lysine ([place]): Does not  use  Tobramycin: Does not use    Colistin: Does not use  Meropenem (inhaled): Does not use  Vancomycin (inhaled): Does not use  ICS: Does not use  ICS/LABA: Does not use  LAMA: No  CFTR modulator: Elexacaftor-Tezacaftor-Ivacaftor (Trikafta) Full-dose (2 orange tablets in AM, 1 blue tablet in PM)  High-dose ibuprofen: No   Oxygen: Does not use  Chronic oral antibiotics: Does not use     Digestive Health Interventions    Acid-suppressing medication?: Yes - omeprazole 40 mg daily  Using CF vitamins?: Yes  Taking pancreatic enzymes?: Yes - Zenpep 20, 4- 5 capsules with meals, 2-3 with snacks  Any diarrhea?: No  Any steatorrhea?: No  Any constipation?: No  Using laxatives?: No  Feeding tube?: Yes  Supplemental enteral nutrition?: Yes     Pulmonary Function Test Results    PFT (4/24/2025, Alyftrek): FEV1 3.40 L (90%), FVC 4.95 L (107%), FEV1/FVC 69; mild airflow obstruction  PFT (1/9/2025, Trikafta): FEV1 3.30 L (83%), FVC 4.84 L (97%), FEV1/FVC 68; mild airflow obstruction  PFT (10/16/2024): FEV1 3.38 L (89%), FVC 5.08 L (109%), FEV1/FVC 67; mild airflow obstruction  PFT (7/8/2024): FEV1 3.64 L (91%), FVC 4.89 L (97%), FEV1/FVC 74; normal spirometry  PFT (3/11/2024): FEV1 3.42 L (85%), FVC 4.89 L (98%), FEV1/FVC 70; normal spirometry  PFT (12/6/2023): FEV1 3.53 L (88%), FVC 4.81 L (96%), FEV1/FVC 73; minimal airflow obstruction  PFT (9/13/2023): FEV1 3.38 L (84%), FVC 4.85 L (97%), FEV1/FVC 70; minimal airflow obstruction.  PFT (6/12/2023): FEV1 3.43 L (85%), FVC 4.79 L (96%), FEV1/FVC 72; normal spirometry  PFT (9/12/2022): FEV1 3.79 L (94%), FVC 4.92 L (98%), FEV1/FVC 77; normal spirometry  PFT (9/11/2018): FEV1 3.06 L (73%), FVC 4.59 L (88%), FEV1/FVC 67; mild airflow obstruction    Current Medications     Current Outpatient Medications   Medication Instructions    Alyftrek 10- mg tablet 2 tablets, oral, Daily    lipase-protease-amylase (Zenpep) 20,000-63,000- 84,000 unit capsule Take 6 capsules by mouth 3 times  "daily (morning, midday, late afternoon). May also take 3 capsules with snacks for snacks.    omeprazole (PRILOSEC) 40 mg, oral, Daily before breakfast       Physical Examination     /71 (BP Location: Left arm, Patient Position: Sitting, BP Cuff Size: Adult)   Pulse 71   Temp 36.8 °C (98.3 °F) (Oral)   Resp 20   Ht 1.753 m (5' 9.02\")   Wt 68.2 kg (150 lb 5.7 oz)   SpO2 95%   BMI 22.19 kg/m²     Daily Weight  04/23/25 : 68.2 kg (150 lb 5.7 oz)  01/07/25 : 65.8 kg (145 lb)  01/07/25 : 67.2 kg (148 lb 2.4 oz)  12/18/24 : 68.9 kg (152 lb)  12/10/24 : 66.3 kg (146 lb 2.6 oz)  12/09/24 : 67.6 kg (149 lb)  12/07/24 : 65.8 kg (145 lb)  10/16/24 : 69.3 kg (152 lb 12.5 oz)     General: Pleasant man in no distress. Well-appearing.  Unaccompanied.  HEENT: Good dentition. Unremarkable nasal mucosa.  Neck: Supple, no lymphadenopathy or thyromegaly  Chest: Clear to auscultation bilaterally.   Cardiac: Regular rhythm, no murmur  Abdomen: Soft, non-tender, non-distended, well-healed transverse incision from gastrocutaneous fistula repair.  Extremities: (+) Mild digital clubbing, no cyanosis or edema  Skin: Multiple nevi.    Metabolic Parameters     Sodium   Date/Time Value Ref Range Status   01/07/2025 08:29  136 - 145 mmol/L Final     Potassium   Date/Time Value Ref Range Status   01/07/2025 08:29 PM 4.0 3.5 - 5.3 mmol/L Final     Chloride   Date/Time Value Ref Range Status   01/07/2025 08:29  98 - 107 mmol/L Final     Bicarbonate   Date/Time Value Ref Range Status   01/07/2025 08:29 PM 30 21 - 32 mmol/L Final     Anion Gap   Date/Time Value Ref Range Status   01/07/2025 08:29 PM 9 (L) 10 - 20 mmol/L Final     Urea Nitrogen   Date/Time Value Ref Range Status   01/07/2025 08:29 PM 19 6 - 23 mg/dL Final     Creatinine   Date/Time Value Ref Range Status   01/07/2025 08:29 PM 0.97 0.50 - 1.30 mg/dL Final     GFR MALE   Date/Time Value Ref Range Status   03/13/2023 12:35 PM >90 >90 mL/min/1.73m2 Final     " Comment:      CALCULATIONS OF ESTIMATED GFR ARE PERFORMED   USING THE 2021 CKD-EPI STUDY REFIT EQUATION   WITHOUT THE RACE VARIABLE FOR THE IDMS-TRACEABLE   CREATININE METHODS.    https://jasn.asnjournals.org/content/early/2021/09/22/ASN.4558230560       Glucose   Date/Time Value Ref Range Status   01/07/2025 08:29  (H) 74 - 99 mg/dL Final     Calcium   Date/Time Value Ref Range Status   01/07/2025 08:29 PM 8.7 8.6 - 10.3 mg/dL Final     Phosphorus   Date/Time Value Ref Range Status   03/11/2024 09:51 AM 3.3 2.5 - 4.9 mg/dL Final     Comment:     The performance characteristics of phosphorus testing in heparinized plasma have been validated by the individual  laboratory site where testing is performed. Testing on heparinized plasma is not approved by the FDA; however, such approval is not necessary.     Cholesterol   Date/Time Value Ref Range Status   03/11/2024 09:51  0 - 199 mg/dL Final     Comment:           Age      Desirable   Borderline High   High     0-19 Y     0 - 169       170 - 199     >/= 200    20-24 Y     0 - 189       190 - 224     >/= 225         >24 Y     0 - 199       200 - 239     >/= 240   **All ranges are based on fasting samples. Specific   therapeutic targets will vary based on patient-specific   cardiac risk.    Pediatric guidelines reference:Pediatrics 2011, 128(S5).Adult guidelines reference: NCEP ATPIII Guidelines,EDDIE 2001, 258:2486-97    Venipuncture immediately after or during the administration of Metamizole may lead to falsely low results. Testing should be performed immediately prior to Metamizole dosing.   03/13/2023 12:35  0 - 199 mg/dL Final     Comment:     .      AGE      DESIRABLE   BORDERLINE HIGH   HIGH     0-19 Y     0 - 169       170 - 199     >/= 200    20-24 Y     0 - 189       190 - 224     >/= 225         >24 Y     0 - 199       200 - 239     >/= 240   **All ranges are based on fasting samples. Specific   therapeutic targets will vary based on  patient-specific   cardiac risk.  .   Pediatric guidelines reference:Pediatrics 2011, 128(S5).   Adult guidelines reference: NCEP ATPIII Guidelines,     EDDIE 2001, 258:2486-97  .   Venipuncture immediately after or during the    administration of Metamizole may lead to falsely   low results. Testing should be performed immediately   prior to Metamizole dosing.     09/12/2022 02:40  0 - 199 mg/dL Final     Comment:     .      AGE      DESIRABLE   BORDERLINE HIGH   HIGH     0-19 Y     0 - 169       170 - 199     >/= 200    20-24 Y     0 - 189       190 - 224     >/= 225         >24 Y     0 - 199       200 - 239     >/= 240   **All ranges are based on fasting samples. Specific   therapeutic targets will vary based on patient-specific   cardiac risk.  .   Pediatric guidelines reference:Pediatrics 2011, 128(S5).   Adult guidelines reference: NCEP ATPIII Guidelines,     EDDIE 2001, 258:2486-97  .   Venipuncture immediately after or during the    administration of Metamizole may lead to falsely   low results. Testing should be performed immediately   prior to Metamizole dosing.       HDL-Cholesterol   Date/Time Value Ref Range Status   03/11/2024 09:51 AM 57.7 mg/dL Final     Comment:       Age       Very Low   Low     Normal    High    0-19 Y    < 35      < 40     40-45     ----  20-24 Y    ----     < 40      >45      ----        >24 Y      ----     < 40     40-60      >60       HDL   Date/Time Value Ref Range Status   03/13/2023 12:35 PM 66.4 mg/dL Final     Comment:     .      AGE      VERY LOW   LOW     NORMAL    HIGH       0-19 Y       < 35   < 40     40-45     ----    20-24 Y       ----   < 40       >45     ----      >24 Y       ----   < 40     40-60      >60  .     09/12/2022 02:40 PM 50.2 mg/dL Final     Comment:     .      AGE      VERY LOW   LOW     NORMAL    HIGH       0-19 Y       < 35   < 40     40-45     ----    20-24 Y       ----   < 40       >45     ----      >24 Y       ----   < 40     40-60       >60  .       Triglycerides   Date/Time Value Ref Range Status   03/11/2024 09:51  0 - 149 mg/dL Final     Comment:        Age         Desirable   Borderline High   High     Very High   0 D-90 D    19 - 174         ----         ----        ----  91 D- 9 Y     0 -  74        75 -  99     >/= 100      ----    10-19 Y     0 -  89        90 - 129     >/= 130      ----    20-24 Y     0 - 114       115 - 149     >/= 150      ----         >24 Y     0 - 149       150 - 199    200- 499    >/= 500    Venipuncture immediately after or during the administration of Metamizole may lead to falsely low results. Testing should be performed immediately prior to Metamizole dosing.   03/13/2023 12:35 PM 93 0 - 149 mg/dL Final     Comment:     .      AGE      DESIRABLE   BORDERLINE HIGH   HIGH     VERY HIGH   0 D-90 D    19 - 174         ----         ----        ----  91 D- 9 Y     0 -  74        75 -  99     >/= 100      ----    10-19 Y     0 -  89        90 - 129     >/= 130      ----    20-24 Y     0 - 114       115 - 149     >/= 150      ----         >24 Y     0 - 149       150 - 199    200- 499    >/= 500  .   Venipuncture immediately after or during the    administration of Metamizole may lead to falsely   low results. Testing should be performed immediately   prior to Metamizole dosing.     09/12/2022 02:40  0 - 149 mg/dL Final     Comment:     .      AGE      DESIRABLE   BORDERLINE HIGH   HIGH     VERY HIGH   0 D-90 D    19 - 174         ----         ----        ----  91 D- 9 Y     0 -  74        75 -  99     >/= 100      ----    10-19 Y     0 -  89        90 - 129     >/= 130      ----    20-24 Y     0 - 114       115 - 149     >/= 150      ----         >24 Y     0 - 149       150 - 199    200- 499    >/= 500  .   Venipuncture immediately after or during the    administration of Metamizole may lead to falsely   low results. Testing should be performed immediately   prior to Metamizole dosing.         Hematologic  Parameters     WHITE BLOOD CELL COUNT   Date/Time Value Ref Range Status   04/23/2025 02:50 PM 8.9 3.8 - 10.8 Thousand/uL Final     Neutrophils Absolute   Date/Time Value Ref Range Status   12/07/2024 08:36 AM 3.00 1.20 - 7.70 x10*3/uL Final     Comment:     Percent differential counts (%) should be interpreted in the context of the absolute cell counts (cells/uL).     Neutrophils %   Date/Time Value Ref Range Status   12/07/2024 08:36 AM 50.8 40.0 - 80.0 % Final     ABSOLUTE LYMPHOCYTES   Date/Time Value Ref Range Status   04/23/2025 02:50 PM 2,038 850 - 3,900 cells/uL Final     LYMPHOCYTES   Date/Time Value Ref Range Status   04/23/2025 02:50 PM 22.9 % Final     ABSOLUTE MONOCYTES   Date/Time Value Ref Range Status   04/23/2025 02:50  200 - 950 cells/uL Final     MONOCYTES   Date/Time Value Ref Range Status   04/23/2025 02:50 PM 6.0 % Final     BASOPHILS   Date/Time Value Ref Range Status   04/23/2025 02:50 PM 0.8 % Final     ABSOLUTE EOSINOPHILS   Date/Time Value Ref Range Status   04/23/2025 02:50  15 - 500 cells/uL Final     EOSINOPHILS   Date/Time Value Ref Range Status   04/23/2025 02:50 PM 1.7 % Final     HEMOGLOBIN   Date/Time Value Ref Range Status   04/23/2025 02:50 PM 14.7 13.2 - 17.1 g/dL Final     HEMATOCRIT   Date/Time Value Ref Range Status   04/23/2025 02:50 PM 44.1 38.5 - 50.0 % Final     RED BLOOD CELL COUNT   Date/Time Value Ref Range Status   04/23/2025 02:50 PM 4.63 4.20 - 5.80 Million/uL Final     MCV   Date/Time Value Ref Range Status   04/23/2025 02:50 PM 95.2 80.0 - 100.0 fL Final     MCHC   Date/Time Value Ref Range Status   04/23/2025 02:50 PM 33.3 32.0 - 36.0 g/dL Final     Comment:     For adults, a slight decrease in the calculated MCHC  value (in the range of 30 to 32 g/dL) is most likely  not clinically significant; however, it should be  interpreted with caution in correlation with other  red cell parameters and the patient's clinical  condition.       PLATELET COUNT    Date/Time Value Ref Range Status   04/23/2025 02:50  140 - 400 Thousand/uL Final       Fat-Soluble Vitamin Levels     VITAMIN D,25-OH,TOTAL,IA   Date/Time Value Ref Range Status   04/23/2025 02:50 PM 38 30 - 100 ng/mL Final     Comment:     Vitamin D Status         25-OH Vitamin D:     Deficiency:                    <20 ng/mL  Insufficiency:             20 - 29 ng/mL  Optimal:                 > or = 30 ng/mL     For 25-OH Vitamin D testing on patients on   D2-supplementation and patients for whom quantitation   of D2 and D3 fractions is required, the QuestAssureD(TM)  25-OH VIT D, (D2,D3), LC/MS/MS is recommended: order   code 19914 (patients >2yrs).     See Note 1     Note 1     For additional information, please refer to   http://education.MedDay/faq/DAR175   (This link is being provided for informational/  educational purposes only.)       Vitamin A (Retinol)   Date/Time Value Ref Range Status   03/11/2024 09:51 AM 0.48 0.30 - 1.20 mg/L Final     Vitamin A, Interpretation   Date/Time Value Ref Range Status   03/11/2024 09:51 AM Normal  Final     Comment:       This test was developed and its performance characteristics   determined by [facility]. It has not been cleared or   approved by the US Food and Drug Administration. This test was   performed in a CLIA certified laboratory and is intended for   clinical purposes.  Performed By: [facility]  [address] [place] [id number]  : Henrry Chapa MD, PhD  CLIA Number: [id number]     Vitamin E (Alpha-Tocopherol)   Date/Time Value Ref Range Status   03/11/2024 09:51 AM 10.5 5.5 - 18.0 mg/L Final     Comment:       This test was developed and its performance characteristics   determined by [facility]. It has not been cleared or   approved by the US Food and Drug Administration. This test was   performed in a CLIA certified laboratory and is intended for   clinical purposes.     Vitamin E  (Gamma-Tocopherol)   Date/Time Value Ref Range Status   03/11/2024 09:51 AM 1.2 0.0 - 6.0 mg/L Final     Comment:     Performed By: [facility]  [address] [place] [address]  : Henrry Chapa MD, PhD  CLIA Number: [id number]       LFT and Associated Parameters     AST   Date/Time Value Ref Range Status   01/07/2025 08:29 PM 21 9 - 39 U/L Final     ALT   Date/Time Value Ref Range Status   01/07/2025 08:29 PM 33 10 - 52 U/L Final     Comment:     Patients treated with Sulfasalazine may generate falsely decreased results for ALT.     Alkaline Phosphatase   Date/Time Value Ref Range Status   01/07/2025 08:29  33 - 120 U/L Final     Bilirubin, Total   Date/Time Value Ref Range Status   01/07/2025 08:29 PM 0.7 0.0 - 1.2 mg/dL Final     Bilirubin, Direct   Date/Time Value Ref Range Status   03/11/2024 09:51 AM 0.1 0.0 - 0.3 mg/dL Final     GGT   Date/Time Value Ref Range Status   03/11/2024 09:51 AM 16 5 - 64 U/L Final     Albumin   Date/Time Value Ref Range Status   01/07/2025 08:29 PM 3.7 3.4 - 5.0 g/dL Final     Total Protein   Date/Time Value Ref Range Status   01/07/2025 08:29 PM 6.4 6.4 - 8.2 g/dL Final       Coagulation Parameters     PT   Date/Time Value Ref Range Status   04/23/2025 02:50 PM 10.9 9.0 - 11.5 sec Final     Comment:     For additional information, please refer to  http://education.UIBLUEPRINT.Healtheo360/faq/ZIN333  (This link is being provided for informational/  educational purposes only.)       INR   Date/Time Value Ref Range Status   04/23/2025 02:50 PM 1.0  Final     Comment:     Reference Range                     0.9-1.1  Moderate-intensity Warfarin Therapy 2.0-3.0  Higher-intensity Warfarin Therapy   3.0-4.0             Diabetes Laboratory Tests     POC HEMOGLOBIN A1c   Date/Time Value Ref Range Status   08/15/2024 08:19 AM 5.8 4.2 - 6.5 % Final     Albumin/Creatine Ratio   Date/Time Value Ref Range Status   03/13/2023 12:35 PM SEE COMMENT 0.0 -  30.0 ug/mg crt Final     Comment:     One or more analytes used in this calculation   is outside of the analytical measurement range.  Calculation cannot be performed.       Glucose tolerance test Fasting   Date/Time Value Ref Range Status   03/27/2019 11:35 AM 97 <126 mg/dL Final     Glucose tolerance test Two Hour   Date/Time Value Ref Range Status   03/27/2019 11:35  (A) <200 mg/dL Final        Immunology Laboratory Tests     IgE   Date/Time Value Ref Range Status   03/11/2024 09:51 AM 6 0 - 214 IU/mL Final     Colonoscopy     Colonoscopy (7/3/2024)  Findings  The terminal ileum appeared normal.;  Aphthous, superficial ulcer in the distal rectum  A moderate amount of semi-liquid stool was visualized throughout the colon.  Lavage was performed with incomplete clearance and fair visualization.  One 3 mm sessile polyp in the descending colon; performed cold snare with complete en bloc removal and retrieved specimen  Internal small hemorrhoids observed during retroflexion; no bleeding was identified    A. COLON - DESCENDING POLYP, BIOPSY:            -Tubular adenoma.    DEXA Scan     XR BONE density 11/28/2022    Narrative  Name: ANGY QUINONEZ  Patient ID: [id number] YOB: 1980 Height: 69.0 in.  Gender:     Male     Exam Date:  11/28/2022 Weight: 147.0 lbs.  Indications: Cystic Fibrosis, malabsorption    Treatments:  Calcium (E943.0), FLUTICASONE, OMEPRAZOLE, Vitamin D  (E933.5)    LEFT FEMUR - TOTAL  The bone mineral density : 0.926 g/cm2  T-score : -1.2    % of young normal mean :84%  Z-score : -1.0    % of age matched mean : 87%  % change vs. Previous : -0.1%    % change vs. Baseline : -1.8%    LEFT FEMUR - NECK  The bone mineral density : 0.882 g/cm2  T-score : -1.4    % of young normal mean: 82%  Z-score : -1.1    % of age matched mean : 86%  % change vs. Previous : 0.1%    % change vs. Baseline : 0.6%    SPINE L1-L4  The bone mineral density is 0.934 g/cm2  T-score : -2.4   % of young  normal mean is 77%  Z-score : -2.4    % of age matched mean is 77%  % change vs. Previous : -2.4%    % change vs. Baseline : 0.1%    World Health Organization (WHO) criteria for post-menopausal,   Women:  Normal:       T-score at or above -1 SD  Osteopenia:   T-score between -1 and -2.5 SD  Osteoporosis: T-score at or below -2.5 SD    10-Year Fracture Risk:  Major Osteoporotic Fracture N/A  Hip Fracture                N/A  Reported Risk Factors       None    Interpretation:  According to World Health Organization criteria, classification is low bone mass.  Followup recommended on November 2024 or sooner as clinically warranted.    Chest Radiograph     XR chest 2 view 11/28/2022    Narrative  MRN: [id number]  Patient Name: AGNY QUINONEZ    STUDY:  TH CHEST 2 VIEW PA AND LAT;    INDICATION:  CF  R63.6: Underweight.    COMPARISON:  09/26/2018.    ACCESSION NUMBER(S):  [id number]    ORDERING CLINICIAN:  GABY LOPEZ    FINDINGS:  The cardiac silhouette size is within normal limits.  There is no focal consolidation, edema or pneumothorax.  No sizeable pleural effusion.    Impression  No acute cardiopulmonary process.    CF Sputum Culture     AFB Culture   Date Value Ref Range Status   10/16/2024 No Mycobacteria isolated.  Final        Respiratory Culture, Cystic Fibrosis   Date/Time Value Ref Range Status   01/09/2025 04:39 PM (2+) Few Normal throat scarlet  Final   10/16/2024 03:51 PM (2+) Few Normal throat scarlet  Final   07/08/2024 11:40 AM (2+) Few Normal throat scarlet  Final   07/08/2024 11:40 AM (A)  Final    (1+) Rare Methicillin Susceptible Staphylococcus aureus (MSSA)   03/11/2024 10:16 AM (2+) Few Normal throat scarlet  Final   12/06/2023 03:03 PM (1+) Rare Normal throat scarlet  Final       Susceptibility data from last 3000 days.  Collected Specimen Info Organism Amikacin Aztreonam Cefepime Ceftazidime Ciprofloxacin Clindamycin Erythromycin Meropenem Oxacillin Piperacillin/Tazobactam Tetracycline  Tobramycin Trimethoprim/Sulfamethoxazole Vancomycin   01/09/25 Fluid from Throat Swab Normal throat scarlet                 10/16/24 Fluid from SPUTUM Normal throat scarlet                 07/08/24 Fluid from Throat Swab Methicillin Susceptible Staphylococcus aureus (MSSA)       S  R   S   S   S  S     Normal throat scarlet                 03/11/24 Fluid from Throat Swab Normal throat scarlet                 12/06/23 Fluid from Throat Swab Normal throat scarlet                 09/13/23 Respiratory Pseudomonas S I S S S   S  I  S         Problem List Items Addressed This Visit       Abnormal glucose tolerance test (GTT)    Current Assessment & Plan   Dr. Schwarz saw Froy on 8/14/2024.  Her note and Froy's recollection suggest that he wore a continuous glucose monitor.  I cannot find this information.  I will ask the CF nurse to talk to the pediatric endocrinology team and see if we can find the data.  If he did not have wildly abnormal hyperglycemia, he should probably have annual glucose tolerance testing.  Pending hemoglobin A1C check today.         Cystic fibrosis - Primary    Current Assessment & Plan   Doing well on Alyftrek.  We checked hepatic function panel today to screen for drug-induced liver injury (DILI).  Not doing aerosols or chest physiotherapy.  Surveillance culture obtained today.  Sick plan: If recent Pseudomonas, ciprofloxacin 750 mg by mouth twice daily x 14 days; otherwise, if MSSA, Bactrim DS, 2 tablets by mouth twice daily x 14 days.         Relevant Orders    Respiratory Culture, Cystic Fibrosis    CBC and Auto Differential (Completed)    Vitamin E (Completed)    Vitamin D 25-Hydroxy,Total (for eval of Vitamin D levels) (Completed)    Vitamin A (Completed)    Renal Function Panel (Completed)    Magnesium (Completed)    Lipid Panel Non-Fasting (Completed)    Immunoglobulin IgE (Completed)    Hepatic Function Panel (Completed)    Hemoglobin A1C (Completed)    Gamma-Glutamyl Transferase (Completed)     Cystatin C with Estimated GFR (Completed)    Protime-INR (Completed)    TSH with reflex to Free T4 if abnormal (Completed)    Follow Up In Pediatric Pulmonology    Urinalysis with Reflex Microscopic (Completed)    Exocrine pancreatic insufficiency (HHS-HCC)    Current Assessment & Plan   Check serum levels of fat-soluble vitamins today.         Relevant Orders    Vitamin E (Completed)    Vitamin D 25-Hydroxy,Total (for eval of Vitamin D levels) (Completed)    Vitamin A (Completed)    Protime-INR (Completed)    GERD (gastroesophageal reflux disease)    Current Assessment & Plan   Currently controlled on daily proton-pump inhibitor.         Impaired glucose tolerance    Long-term current use of proton pump inhibitor therapy    Current Assessment & Plan   Check serum magnesium today.         Relevant Orders    Magnesium (Completed)    Melanocytic nevus of trunk    Overview   Shave Biopsy (12/7/2023): MELANOCYTIC NEVUS WITH MILD JUNCTIONAL ARCHITECTURAL DISORDER AND ADJACENT DERMAL MELANOCYTIC NEVUS (SEE COMMENT)         Current Assessment & Plan   Froy told me today that he is aware of the need to reschedule a routine follow-up appointment with dermatology.  Other health issues (kidney stone) and vacation prevented him from attending appointment.         Vitamin D deficiency    Relevant Orders    Vitamin D 25-Hydroxy,Total (for eval of Vitamin D levels) (Completed)    Calculus of ureter    Current Assessment & Plan   I placed a referral order to urology to ensure follow-up is scheduled with another provider in light of Dr. Montelongo's departure.         Relevant Orders    Referral to Urology         Follow-up:    Future Appointments   Date Time Provider Department Center   8/11/2025 10:45 AM  RESP PFT TECH [id number] Academic   8/11/2025 11:00 AM Duke Chatman MD [id number] Academic        Duke Chatman MD   04/23/2025

## 2025-04-23 NOTE — PROGRESS NOTES
[facility] SPECIALTY PHARMACY PATIENT REASSESSMENT NOTE:  CYSTIC FIBROSIS    Introduction  Froy Parisi is a 44 y.o. male who is on the specialty pharmacy service for management of: Cystic Fibrosis Core    [facility] Supplied Medication::  Alyftrek    Duration of therapy: Maintenance    The most recent encounter visit with the referring prescriber Duke Chatman on 4/23/25 was reviewed.   Pharmacy will continue to collaborate in the care of this patient with the referring prescriber Duke Chatman MD.    Discussion  Froy was contacted on 4/23/2025 at 12:33 PM for a pharmacy visit with encounter number [id number] from:   [facility]  [address]  VIRTUAL DEPARTMENT  [facility] [address]  Dept: [phone]  Dept Fax: [phone]  Loc: [phone]  Froy consented to a/an In person visit, which was performed.    Efficacy  Have you developed any new symptoms of your condition? No changes reported  Patient/caregiver feels medication is affecting the disease state: Does not notice any difference from Trikafta, but feels it is working well.    Goals  Provided education on goals and possible outcomes of therapy:  Adherence with therapy  Timely completion of appropriate labs  Timely and appropriate follow up with provider  Identify and address medication interactions with presciption medications, OTC medications and supplements  Optimize or maintain quality of life  Cystic Fibrosis: Improve/Maintain lung function  Reduce frequency of illness  Reduce frequency of hospitalizations  Patient has documented target(s) for goals of therapy: Yes    Tolerance  Patient has experienced side effects from this medication: No  Changes to current therapy regimen: No    The follow-up timeline was discussed. Every person responds to and reacts to therapy differently. Patient should be assessed for efficacy and tolerability in approximately: other - follow up with CF pharmacist at least  annually    Adherence          Have you had any unplanned missed doses?Yes, 1 dose around once a a week   If yes, how often do you miss doses and is there a particular contributing reason?     What barriers to adherence does the patient have? (Answer Y/N for all):  Patient has a difficult time remembering to take medications? No  Difficult administration technique?No  Medication cost? No  Patient does not think medication is beneficial?No  Other?   What actions were taken to address barriers?    Does the patient have any barriers to self-administration (including physical and mental)? No  List barriers:   Describe actions taken to mitigate barriers:    The importance of adherence was discussed and patient/caregiver was advised to take the medication as prescribed by their provider. Encouraged patient/caregiver to call physician's office or specialty pharmacy if they have a question regarding a missed dose.    General Assessment  Changes to home medications, OTCs or supplements: No  Medications Ordered Prior to Encounter[1]    Reported new allergies: No  Allergies as of 04/23/2025    (No Known Allergies)       Reported new medical conditions: No  Problem List[2]    CFTR Genotype: :N775uly and  1898+1G->A   Current CFTR Modulator: Alyftrek  Dose:  2 tablets once daily  Vertex GPS: Yes    Pulmonary Function Tests     FEV1   Date/Time Value Ref Range Status   01/09/2025 02:00 PM 3.30 liters      Comment:     83%   09/09/2024 09:43 AM 3.40 liters      Comment:     85%   07/08/2024 11:16 AM 3.64 liters Final     Comment:     91%       Labs  Lab Results   Component Value Date    WBC 11.3 01/07/2025    HGB 13.2 (L) 01/07/2025    HCT 39.9 (L) 01/07/2025     01/07/2025    CHOL 155 03/11/2024    TRIG 128 03/11/2024    HDL 57.7 03/11/2024    ALT 33 01/07/2025    AST 21 01/07/2025     01/07/2025    K 4.0 01/07/2025     01/07/2025    CREATININE 0.97 01/07/2025    BUN 19 01/07/2025    CO2 30 01/07/2025    TSH  0.71 09/12/2022    INR 1.0 09/12/2022    HGBA1C 5.8 08/15/2024     Is laboratory follow-up needed? Liver function tests due monthly for the first 6 months of Alyftrek, last gotten 1/7/25 and is getting them drawn today    Advised to contact the pharmacy if there are any changes to the patient's medication list, including prescriptions, OTC medications, herbal products, or supplements.    PATIENT MANAGEMENT:    Do you have any questions regarding your medications, or care? no additional questions    Do you have any concerns with access to your medications? No concerns expressed    How would you classify your Quality of Life? Doing well    Fills medications at:  Specialty    How would you describe your satisfaction with  Specialty Pharmacy? Satisfied    Do you have any additional suggestions to improve  Specialty Pharmacy services: n/a    IMPRESSION/PLAN:  This patient has not been identified as high risk due to Lack of high risk qualifiers.  The following action was taken: N/A.    Is a clinical intervention needed? no  Continue therapies      Additional comments:         [facility] Patients: Provided contact information ([phone]) for [facility] and reviewed dispensing process, refill timeline and patient management follow up.  Confirmed understanding of education conducted during assessment. All questions and concerns were addressed and patient/caregiver was encouraged to reach out for additional questions or concerns.    Based on the patient's diagnosis, medication list, progress towards goals, adherence, tolerance, and medication list, medication remains appropriate: Therapy remains appropriate (I attest)    Pancho Mckeon, PharmD  4/23/2025 12:33 PM        [1]   Current Outpatient Medications on File Prior to Visit   Medication Sig Dispense Refill    albuterol 90 mcg/actuation inhaler inhale 2 puff by mouth and INTO THE LUNGS every 6 hours if needed (Patient not taking:  Reported on 4/23/2025)      Alyftrek 10- mg tablet Take 2 tablets by mouth once daily. 56 tablet 6    blood sugar diagnostic (OneTouch Verio test strips) strip Test blood sugar fasting, 2 hours after your largest meal, and as needed (Patient not taking: Reported on 12/9/2024)      calcium citrate-vitamin D3 (Citracal+D) 315 mg-5 mcg (200 unit) tablet Take 2 tablets by mouth once daily. (Patient not taking: Reported on 4/23/2025) 180 tablet 3    DEKAs Plus 200 mcg-1,000 mcg-10 mg capsule Take 2 capsules by mouth once daily. (Patient not taking: Reported on 4/23/2025) 60 capsule 11    lipase-protease-amylase (Zenpep) 20,000-63,000- 84,000 unit capsule Take 6 capsules by mouth 3 times daily (morning, midday, late afternoon). May also take 3 capsules with snacks for snacks. 540 capsule 11    omeprazole (PriLOSEC) 40 mg DR capsule Take 1 capsule (40 mg) by mouth once daily in the morning. Take before meals. 90 capsule 3    OneTouch Delica Plus Lancet 33 gauge misc  (Patient not taking: Reported on 4/23/2025)      phenazopyridine (Pyridium) 200 mg tablet Take 1 tablet (200 mg) by mouth 3 times a day as needed for bladder spasms. (Patient not taking: Reported on 4/23/2025) 30 tablet 0    potassium citrate CR (Urocit-K 10) 10 mEq ER tablet Take 1 tablet (10 mEq) by mouth 2 times daily (morning and late afternoon). Do not crush, chew, or split. (Patient not taking: Reported on 4/23/2025) 60 tablet 11     No current facility-administered medications on file prior to visit.   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) (Multi)    Cystic fibrosis    Tubular adenoma of colon    Abnormal finding of diagnostic imaging    Abnormal glucose tolerance test (GTT)    Abnormal magnetic resonance imaging of head    Arthralgia of hip    Chronic obstructive pulmonary disease (Multi)    Exocrine pancreatic insufficiency (HHS-HCC)    Low bone density    Pain in left hip    Pleurodynia    Vitamin D insufficiency    Cystic fibrosis with gastrointestinal manifestations    Flu-like symptoms    Obstruction of left ureteropelvic junction (UPJ) due to stone    Calculus of ureter    Kidney stone    Type 2 diabetes mellitus, with long-term current use of insulin

## 2025-04-23 NOTE — PROGRESS NOTES
"Reason for follow up:  GI changes, protein needs    Goals or plan from last visit:  Continue PO Boost Plus BID  Continue to monitor healing of G-Tube site    Subjective update/information:  Pt. Is here alone today.    Pt. Reports that he noticed a change in his stools. He notes that they are firmer and lumpier. He recently increased his fiber and protein intake as he would like to gain wt. Pt. Denies straining when passing stools, incomplete evacuation, and going more than 1 day without a BM. RD explained that diet changes would explain the change in stools. RD discussed the need for Miralax if stools become harder. Pt. Was agreeable.     Pt. Asked about protein recommendations for people with CF. Pt. Reports that he exercises regularly with weight training. RD provided protein requirements based on pt.'s current weight and desire to gain muscle.     Objective updates:    Current weight/BMI:  22.19 kg/m²  1.753 m (5' 9.02\")  as of 4/23/2025  68.2 kg (150 lb 5.7 oz)  as of 4/23/2025     Weight/BMI trend:  +2.4 kg since 1/7/25    Food insecurity screen:  Negative     New labs/test results since last RD note:  None    Admissions/calls since last visit:  None    Assessment and Plan:  At risk BMI  Maintain fiber and protein intake  Protein Needs: 100-140 g/day   Use Miralax if needing to strain to pass stools or go more than 1 day without having a stool    Smart goals if goal has been set:  None set today    Follow-up:  Next clinic appointment  Pt. Is encouraged to reach out between clinic visits as nutritional concerns arise  "

## 2025-04-23 NOTE — PATIENT INSTRUCTIONS
"    It was very nice to see you today. We can offer you in-person and \"virtual\" appointments with your cystic fibrosis provider.    If you need to cancel or schedule an appointment, please call the  at the [facility] at [phone] between the hours of 8 AM and 5 PM, Monday-Friday.    To reach the CF nurse, Rica, please call [phone]. Rica has a secure voice mail account if you want to leave a message.    If you need to speak with an adult cystic fibrosis provider between the hours of 5 PM and 8 AM (overnight) or anytime on Saturday or Sunday, please call [phone]. When you call this number, you will be connected to an  with the [facility] answering service. You should tell the  that you're an adult with cystic fibrosis who needs to speak to the \"cystic fibrosis doctor on-call.\" The  will take your contact information. You should then expect a call back from the cystic fibrosis doctor on-call within a short period of time.      Important Information:  Your CFTR variants (mutations) are: F508 del / 1898+1G->A    Your percent-predicted FEV1 today was: 90%    Your weight adjusted for height (body mass index, BMI) today was: 22.2  (goal for adult males with CF = 23.0 kg/m2, goal for adult females with CF = 22.0 kg/m2)    Sick plan (please call our office if you think you need to take antibiotics or be admitted to the hospital):   Ciprofloxacin 750 mg by mouth twice daily for 14 days.     Great job on the weight gain.  Continue the protein supplements and increased meal frequency.    If you ever want to reduce the dose of omeprazole or stop the omeprazole, let us know.  You should not abruptly stop the omeprazole because you can have \"rebound\" acid reflux that does not feel good.  Alyftrek seems to be agreeing with your body.  We will check your liver function tests today to make sure your liver is not inflamed by the medication.  I " cleaned up your medication list.  No changes to your enzyme regimen.  Evangelina (dietitian) thinks that the change in hardness of stool could be due to increased fiber intake.    Next appointment: August 2025

## 2025-04-24 PROBLEM — Z79.899 LONG-TERM CURRENT USE OF PROTON PUMP INHIBITOR THERAPY: Status: ACTIVE | Noted: 2025-04-24

## 2025-04-24 LAB
APPEARANCE UR: CLEAR
BILIRUB UR QL STRIP: NEGATIVE
COLOR UR: YELLOW
GLUCOSE UR QL STRIP: NEGATIVE
HGB UR QL STRIP: NEGATIVE
KETONES UR QL STRIP: NEGATIVE
LEUKOCYTE ESTERASE UR QL STRIP: NEGATIVE
NITRITE UR QL STRIP: NEGATIVE
PH UR STRIP: 6.5 [PH] (ref 5–8)
PROT UR QL STRIP: NEGATIVE
SP GR UR STRIP: 1.01 (ref 1–1.03)

## 2025-04-24 NOTE — PROGRESS NOTES
I agree with the pharmacy resident's assessment.    Stable on Alyftrek. Repeat LFTs in 1 month.     Nadia Malik, PharmD  4:48 PM  04/24/25

## 2025-04-24 NOTE — ASSESSMENT & PLAN NOTE
Froy told me today that he is aware of the need to reschedule a routine follow-up appointment with dermatology.  Other health issues (kidney stone) and vacation prevented him from attending appointment.

## 2025-04-24 NOTE — ASSESSMENT & PLAN NOTE
I placed a referral order to urology to ensure follow-up is scheduled with another provider in light of Dr. Montelongo's departure.

## 2025-04-24 NOTE — ASSESSMENT & PLAN NOTE
Doing well on Alyftrek.  We checked hepatic function panel today to screen for drug-induced liver injury (DILI).  Not doing aerosols or chest physiotherapy.  Surveillance culture obtained today.  Sick plan: If recent Pseudomonas, ciprofloxacin 750 mg by mouth twice daily x 14 days; otherwise, if MSSA, Bactrim DS, 2 tablets by mouth twice daily x 14 days.

## 2025-04-24 NOTE — ASSESSMENT & PLAN NOTE
Dr. Schwarz saw Froy on 8/14/2024.  Her note and Froy's recollection suggest that he wore a continuous glucose monitor.  I cannot find this information.  I will ask the CF nurse to talk to the pediatric endocrinology team and see if we can find the data.  If he did not have wildly abnormal hyperglycemia, he should probably have annual glucose tolerance testing.  Pending hemoglobin A1C check today.

## 2025-04-25 ENCOUNTER — SPECIALTY PHARMACY (OUTPATIENT)
Dept: PHARMACY | Facility: CLINIC | Age: 45
End: 2025-04-25

## 2025-04-25 NOTE — RESULT ENCOUNTER NOTE
Aidee Coronel note about your blood and urine tests.  Two of the liver function test numbers (AST and ALT) were mildly elevated.  We've seen this on your blood tests before, but we want to watch these closely now that you're on the Alyftrek.  I think you should have the liver function tests checked again in 4 weeks.  I'll alert Rica (nurse) and Nadia (pharmacist) to this recommendation.  Your hemoglobin A1C was 6.0%, which is also slightly higher than it was last year.  This test is supposed to reflect your average blood sugar control over the previous 3 months, so I would interpret the value (6.0%) as meaning your blood sugars have been a little bit higher lately.  I will alert Evangelina (dietitian) and Dr. Schwarz (endocrinology) to this finding.  They may recommend another period of monitoring with the continuous glucose monitor and/or a formal oral glucose tolerance test.  We'll see what they say.  Other tests are pending.  Best regards,  Duke Chatman MD

## 2025-04-26 LAB
25(OH)D3+25(OH)D2 SERPL-MCNC: 38 NG/ML (ref 30–100)
A-TOCOPHEROL VIT E SERPL-MCNC: 8.5 MG/L (ref 5.7–19.9)
ALBUMIN SERPL-MCNC: 4.3 G/DL (ref 3.6–5.1)
ALBUMIN SERPL-MCNC: 4.3 G/DL (ref 3.6–5.1)
ALBUMIN/GLOB SERPL: 1.5 (CALC) (ref 1–2.5)
ALP SERPL-CCNC: 111 U/L (ref 36–130)
ALT SERPL-CCNC: 53 U/L (ref 9–46)
AST SERPL-CCNC: 48 U/L (ref 10–40)
BACTERIA SPT CF RESP CULT: NORMAL
BASOPHILS # BLD AUTO: 71 CELLS/UL (ref 0–200)
BASOPHILS NFR BLD AUTO: 0.8 %
BETA+GAMMA TOCOPHEROL SERPL-MCNC: <1 MG/L
BILIRUB DIRECT SERPL-MCNC: 0.1 MG/DL
BILIRUB INDIRECT SERPL-MCNC: 0.5 MG/DL (CALC) (ref 0.2–1.2)
BILIRUB SERPL-MCNC: 0.6 MG/DL (ref 0.2–1.2)
BUN SERPL-MCNC: 17 MG/DL (ref 7–25)
BUN/CREAT SERPL: NORMAL (CALC) (ref 6–22)
CALCIUM SERPL-MCNC: 9.2 MG/DL (ref 8.6–10.3)
CHLORIDE SERPL-SCNC: 105 MMOL/L (ref 98–110)
CHOLEST SERPL-MCNC: 163 MG/DL
CHOLEST/HDLC SERPL: 2.5 (CALC)
CO2 SERPL-SCNC: 23 MMOL/L (ref 20–32)
CREAT SERPL-MCNC: 0.81 MG/DL (ref 0.6–1.29)
CYSTATIN C SERPL-MCNC: 0.91 MG/L (ref 0.52–1.27)
EGFRCR SERPLBLD CKD-EPI 2021: 112 ML/MIN/1.73M2
EOSINOPHIL # BLD AUTO: 151 CELLS/UL (ref 15–500)
EOSINOPHIL NFR BLD AUTO: 1.7 %
ERYTHROCYTE [DISTWIDTH] IN BLOOD BY AUTOMATED COUNT: 12.5 % (ref 11–15)
EST. AVERAGE GLUCOSE BLD GHB EST-MCNC: 126 MG/DL
EST. AVERAGE GLUCOSE BLD GHB EST-SCNC: 7 MMOL/L
GFR/BSA.PRED SERPLBLD CYS-BASED-ARV: 94 ML/MIN/1.73M2
GGT SERPL-CCNC: 16 U/L (ref 3–95)
GLOBULIN SER CALC-MCNC: 2.9 G/DL (CALC) (ref 1.9–3.7)
GLUCOSE SERPL-MCNC: 87 MG/DL (ref 65–99)
HBA1C MFR BLD: 6 %
HCT VFR BLD AUTO: 44.1 % (ref 38.5–50)
HDLC SERPL-MCNC: 65 MG/DL
HGB BLD-MCNC: 14.7 G/DL (ref 13.2–17.1)
IGE SERPL-ACNC: 18 KU/L
INR PPP: 1
LDLC SERPL CALC-MCNC: 81 MG/DL (CALC)
LYMPHOCYTES # BLD AUTO: 2038 CELLS/UL (ref 850–3900)
LYMPHOCYTES NFR BLD AUTO: 22.9 %
MAGNESIUM SERPL-MCNC: 2.2 MG/DL (ref 1.5–2.5)
MCH RBC QN AUTO: 31.7 PG (ref 27–33)
MCHC RBC AUTO-ENTMCNC: 33.3 G/DL (ref 32–36)
MCV RBC AUTO: 95.2 FL (ref 80–100)
MONOCYTES # BLD AUTO: 534 CELLS/UL (ref 200–950)
MONOCYTES NFR BLD AUTO: 6 %
NEUTROPHILS # BLD AUTO: 6105 CELLS/UL (ref 1500–7800)
NEUTROPHILS NFR BLD AUTO: 68.6 %
NONHDLC SERPL-MCNC: 98 MG/DL (CALC)
PHOSPHATE SERPL-MCNC: 4.2 MG/DL (ref 2.5–4.5)
PLATELET # BLD AUTO: 167 THOUSAND/UL (ref 140–400)
PMV BLD REES-ECKER: 10.8 FL (ref 7.5–12.5)
POTASSIUM SERPL-SCNC: 4 MMOL/L (ref 3.5–5.3)
PROT SERPL-MCNC: 7.2 G/DL (ref 6.1–8.1)
PROTHROMBIN TIME: 10.9 SEC (ref 9–11.5)
RBC # BLD AUTO: 4.63 MILLION/UL (ref 4.2–5.8)
SODIUM SERPL-SCNC: 141 MMOL/L (ref 135–146)
TRIGL SERPL-MCNC: 83 MG/DL
TSH SERPL-ACNC: 0.99 MIU/L (ref 0.4–4.5)
VIT A SERPL-MCNC: 31 MCG/DL (ref 38–98)
WBC # BLD AUTO: 8.9 THOUSAND/UL (ref 3.8–10.8)

## 2025-04-27 PROCEDURE — RXMED WILLOW AMBULATORY MEDICATION CHARGE

## 2025-04-28 DIAGNOSIS — E84.9 CYSTIC FIBROSIS: ICD-10-CM

## 2025-04-28 NOTE — PROGRESS NOTES
Patient's vitamin A result was abnormal. CORI Hutchinson suggested ordering CRP, RBP and Zinc level to see if patient is truly vitamin A insufficient. Dr. Chatman stated that the labs can be ordered.

## 2025-04-29 ENCOUNTER — PHARMACY VISIT (OUTPATIENT)
Dept: PHARMACY | Facility: CLINIC | Age: 45
End: 2025-04-29
Payer: COMMERCIAL

## 2025-05-21 ENCOUNTER — SPECIALTY PHARMACY (OUTPATIENT)
Dept: PHARMACY | Facility: CLINIC | Age: 45
End: 2025-05-21

## 2025-05-27 DIAGNOSIS — Z13.21 ENCOUNTER FOR VITAMIN DEFICIENCY SCREENING: ICD-10-CM

## 2025-05-27 DIAGNOSIS — R63.8 DIFFICULTY MAINTAINING WEIGHT: ICD-10-CM

## 2025-05-27 DIAGNOSIS — E84.8 EXOCRINE PANCREATIC MANIFESTATION OF CYSTIC FIBROSIS (MULTI): ICD-10-CM

## 2025-05-27 DIAGNOSIS — Z51.81 ENCOUNTER FOR MEDICATION MONITORING: ICD-10-CM

## 2025-05-27 DIAGNOSIS — Z51.81 ENCOUNTER FOR THERAPEUTIC DRUG MONITORING: ICD-10-CM

## 2025-05-27 DIAGNOSIS — E50.9 VITAMIN A DEFICIENCY: ICD-10-CM

## 2025-05-27 DIAGNOSIS — E84.9 CYSTIC FIBROSIS: ICD-10-CM

## 2025-05-27 DIAGNOSIS — K86.81 EXOCRINE PANCREATIC INSUFFICIENCY (HHS-HCC): ICD-10-CM

## 2025-05-27 DIAGNOSIS — R79.89 ABNORMAL LFTS: ICD-10-CM

## 2025-05-27 DIAGNOSIS — K71.9 DRUG-INDUCED LIVER INJURY: Primary | ICD-10-CM

## 2025-05-27 RX ORDER — ELEXACAFTOR, TEZACAFTOR, AND IVACAFTOR 100-50-75
KIT ORAL
Qty: 84 TABLET | Refills: 3 | Status: SHIPPED | OUTPATIENT
Start: 2025-05-27

## 2025-05-27 NOTE — TELEPHONE ENCOUNTER
Pulmonary Attending    Requested Prescriptions     Signed Prescriptions Disp Refills    elexacaftor-tezacaftor-ivacaft (Trikafta) 100-50-75 mg tablet 84 tablet 3     Sig: Take 2 orange tablets by mouth in the morning with fat-containing food and 1 blue tablet by mouth in the evening with fat-containing food, approximately 12 hours apart.     Authorizing Provider: SAMARA HUTCHINSON      Specialty Pharmacy  [address] [place] [id number]  Phone: [phone] Fax: [phone]    Encounter Diagnoses   Name Primary?    Cystic fibrosis     Encounter for medication monitoring     Vitamin A deficiency     Encounter for vitamin deficiency screening     Encounter for therapeutic drug monitoring     Difficulty maintaining weight     Exocrine pancreatic manifestation of cystic fibrosis (Multi)     Exocrine pancreatic insufficiency (HHS-HCC)     Abnormal LFTs     Drug-induced liver injury Yes      Orders Placed This Encounter   Procedures    Gamma-Glutamyl Transferase     Standing Status:   Future     Expected Date:   5/27/2025     Expiration Date:   5/27/2026     Release result to [facility]:   Immediate [1]    Hepatic Function Panel     Standing Status:   Future     Expected Date:   5/27/2025     Expiration Date:   5/27/2026     Release result to [facility]:   Immediate [1]    C-Reactive Protein     Standing Status:   Future     Number of Occurrences:   1     Expected Date:   5/27/2025     Expiration Date:   5/27/2026     Release result to [facility]:   Immediate [1]     [facility] Carveout?:   CARVEOUT: SEND TO [facility]    Zinc, Serum or Plasma     Standing Status:   Future     Number of Occurrences:   1     Expected Date:   5/27/2025     Expiration Date:   5/27/2026     Release result to [facility]:   Immediate [1]     [facility] Carveout?:   CARVEOUT: SEND TO [facility]    Retinol Binding Protein     Standing Status:   Future     Number of Occurrences:   1     Expected Date:   5/27/2025     Expiration Date:   5/27/2026     [facility]   Immediate [1]     [facility] Car[facility]out?:   CARVEOUT: SEND TO [facility]     Duke Chatman MD  5/27/2025  3:07 PM

## 2025-05-27 NOTE — TELEPHONE ENCOUNTER
Froy called RN stating that he wanted to stop Alyftrek and restart Trikafta. He stated he has lost some weight. His scale at home is saying 143lbs. RN verbalized that he can go back on Trikafta but to start it tomorrow. RN also asked if he would still get the liver labs to make sure they are still WNL. Froy verbalized that he will try to get the labs today and that he does have about a month's worth of Trikafta on hand to start tomorrow.    RN will inform Dr. Chatman and Nadia, PharmD. Lab orders placed for Froy to obtain.

## 2025-05-28 ENCOUNTER — SPECIALTY PHARMACY (OUTPATIENT)
Dept: PHARMACY | Facility: CLINIC | Age: 45
End: 2025-05-28

## 2025-05-28 PROCEDURE — RXMED WILLOW AMBULATORY MEDICATION CHARGE

## 2025-05-29 ENCOUNTER — PHARMACY VISIT (OUTPATIENT)
Dept: PHARMACY | Facility: CLINIC | Age: 45
End: 2025-05-29
Payer: COMMERCIAL

## 2025-06-20 ENCOUNTER — SPECIALTY PHARMACY (OUTPATIENT)
Dept: PHARMACY | Facility: CLINIC | Age: 45
End: 2025-06-20

## 2025-06-20 ENCOUNTER — PHARMACY VISIT (OUTPATIENT)
Dept: PHARMACY | Facility: CLINIC | Age: 45
End: 2025-06-20
Payer: COMMERCIAL

## 2025-06-20 PROCEDURE — RXMED WILLOW AMBULATORY MEDICATION CHARGE

## 2025-06-24 NOTE — PROGRESS NOTES
PT Follow Up: CF Clinic    Patient Name:Froy Parisi  MRN:[id number]  Today's Date: 4/23/2025       PT saw Moises for a follow up in CF clinic. Reviewed his current workout along with goals. Moises is back to running on the treadmill (walk/run intervals). He is working on glute strengthening which is helping. Moises has a workout noemi on his phone to keep him organized. PT gave him a few new exercises/stretches to add to routine (see below).  Moises also noted continued flexibility issues with R shoulder and tight R upper traps. Added a R shoulder IR stretch to routine and reviewed with him. Will follow up with Moises at next CF Clinic visit.     Pain  0/10    Social History  No changes    Respiratory    FEV1   Date Value Ref Range Status   01/09/2025 3.30 liters      Comment:     83%     FVC   Date Value Ref Range Status   01/09/2025 4.84 liters      Comment:     97%     FEV1/FVC   Date Value Ref Range Status   01/09/2025 68 %           Modulator Therapy: [employer]                                         Exercise: current program  [employer] 3x/week  20 min on treadmill: walk x 4 min, run x 12 min at 5 mph, repeat  Weights: cable machines, free weights, lifts heavier with 8-10 reps per set  Spends approx 1 hr at the gym    ROM  R shoulder IR with decr ROM compared to L shoulder IR  -tested behind back  R upper traps with incr tightness compared to L with side bend     Exercise Tests/Outcome Measures: RPD/RPE on all tests    1) Plank Hold  Total time: 90 seconds    What body part gave out first?  Abs/hips    Exercises/stretches reviewed today: printed out paper copy  1.R shoulder IR with strap  10 reps x 10 sec each  2.Levator scap stretch  3.Upper traps stretch  4.Supine and seated figure 4 stretch  5.T band monster walk      Mayra Samuels PT, DPT

## 2025-07-02 DIAGNOSIS — E84.8 EXOCRINE PANCREATIC MANIFESTATION OF CYSTIC FIBROSIS (MULTI): ICD-10-CM

## 2025-07-02 RX ORDER — PANCRELIPASE LIPASE, PANCRELIPASE PROTEASE, PANCRELIPASE AMYLASE 20000; 63000; 84000 [USP'U]/1; [USP'U]/1; [USP'U]/1
CAPSULE, DELAYED RELEASE ORAL
Qty: 540 CAPSULE | Refills: 11 | Status: SHIPPED | OUTPATIENT
Start: 2025-07-02 | End: 2026-07-02

## 2025-07-02 RX ORDER — OMEPRAZOLE 40 MG/1
40 CAPSULE, DELAYED RELEASE ORAL
Qty: 90 CAPSULE | Refills: 3 | Status: SHIPPED | OUTPATIENT
Start: 2025-07-02 | End: 2026-07-02

## 2025-07-02 RX ORDER — PEDI MULTIVIT NO.128/VITAMIN K 500 MCG/ML
2 LIQUID (ML) ORAL DAILY
Qty: 60 CAPSULE | Refills: 11 | Status: SHIPPED | OUTPATIENT
Start: 2025-07-02 | End: 2026-07-02

## 2025-07-02 NOTE — TELEPHONE ENCOUNTER
Froy called and left a VM stating he needs all his medications to be filled at [facility] in [place] instead of the [facility]. RN refilled Omeprazole and Zenpep and pended them to Dr. Chatman.     RN called Froy back to let him know that I received his message and that Omeprazole and Zenpep will be sent to [facility]. Froy also asked for his RADHA to also be sent to [facility]. RN verbalized understanding.

## 2025-07-02 NOTE — TELEPHONE ENCOUNTER
Pulmonary Attending    Requested Prescriptions     Signed Prescriptions Disp Refills    omeprazole (PriLOSEC) 40 mg DR capsule 90 capsule 3     Sig: Take 1 capsule (40 mg) by mouth once daily in the morning. Take before meals.     Authorizing Provider: DUKE CHATMAN    lipase-protease-amylase (Zenpep) 20,000-63,000- 84,000 unit capsule 540 capsule 11     Sig: Take 6 capsules by mouth 3 times daily (morning, midday, late afternoon). May also take 3 capsules with snacks for snacks.     Authorizing Provider: DUKE CHATMAN    multivitamin with minerals-folic acid-vitamin K-CoQ (DEKAs Plus) 200 mcg-1,000 mcg-10 mg capsule 60 capsule 11     Sig: Take 2 capsules by mouth once daily.     Authorizing Provider: DUKE CHATMAN     [facility]/pharmacy #[id number] [place] [id number]  Phone: [phone] Fax: [phone]    Duke Chatman MD  7/2/2025  4:18 PM

## 2025-07-16 ENCOUNTER — SPECIALTY PHARMACY (OUTPATIENT)
Dept: PHARMACY | Facility: CLINIC | Age: 45
End: 2025-07-16

## 2025-07-16 PROCEDURE — RXMED WILLOW AMBULATORY MEDICATION CHARGE

## 2025-07-18 ENCOUNTER — PHARMACY VISIT (OUTPATIENT)
Dept: PHARMACY | Facility: CLINIC | Age: 45
End: 2025-07-18
Payer: COMMERCIAL

## 2025-08-11 ENCOUNTER — APPOINTMENT (OUTPATIENT)
Dept: RESPIRATORY THERAPY | Facility: HOSPITAL | Age: 45
End: 2025-08-11
Payer: MEDICARE

## 2025-08-11 ENCOUNTER — APPOINTMENT (OUTPATIENT)
Dept: PEDIATRIC PULMONOLOGY | Facility: HOSPITAL | Age: 45
End: 2025-08-11
Payer: MEDICARE

## 2025-08-13 ENCOUNTER — HOSPITAL ENCOUNTER (OUTPATIENT)
Dept: RESPIRATORY THERAPY | Facility: HOSPITAL | Age: 45
Discharge: HOME | End: 2025-08-13
Payer: MEDICARE

## 2025-08-13 ENCOUNTER — MULTIDISCIPLINARY VISIT (OUTPATIENT)
Dept: PEDIATRIC PULMONOLOGY | Facility: HOSPITAL | Age: 45
End: 2025-08-13
Payer: MEDICARE

## 2025-08-13 ENCOUNTER — NUTRITION (OUTPATIENT)
Dept: PEDIATRIC PULMONOLOGY | Facility: HOSPITAL | Age: 45
End: 2025-08-13

## 2025-08-13 ENCOUNTER — MULTIDISCIPLINARY VISIT (OUTPATIENT)
Dept: PEDIATRIC ENDOCRINOLOGY | Facility: HOSPITAL | Age: 45
End: 2025-08-13
Payer: MEDICARE

## 2025-08-13 VITALS
OXYGEN SATURATION: 95 % | WEIGHT: 150.13 LBS | DIASTOLIC BLOOD PRESSURE: 65 MMHG | HEIGHT: 69 IN | BODY MASS INDEX: 22.24 KG/M2 | SYSTOLIC BLOOD PRESSURE: 94 MMHG | RESPIRATION RATE: 16 BRPM | HEART RATE: 65 BPM | TEMPERATURE: 98.2 F

## 2025-08-13 DIAGNOSIS — E84.9 CYSTIC FIBROSIS: Primary | ICD-10-CM

## 2025-08-13 DIAGNOSIS — E84.9 CYSTIC FIBROSIS: ICD-10-CM

## 2025-08-13 DIAGNOSIS — D22.5 MELANOCYTIC NEVUS OF TRUNK: ICD-10-CM

## 2025-08-13 DIAGNOSIS — E08.9 DIABETES MELLITUS RELATED TO CF (CYSTIC FIBROSIS) (MULTI): ICD-10-CM

## 2025-08-13 DIAGNOSIS — R23.3 EASY BRUISING: ICD-10-CM

## 2025-08-13 DIAGNOSIS — E84.8 DIABETES MELLITUS RELATED TO CF (CYSTIC FIBROSIS) (MULTI): ICD-10-CM

## 2025-08-13 LAB
MGC ASCENT PFT - FEV1 - PRE: 3.49
MGC ASCENT PFT - FEV1 - PREDICTED: 3.74
MGC ASCENT PFT - FVC - PRE: 4.99
MGC ASCENT PFT - FVC - PREDICTED: 4.61
POC HEMOGLOBIN A1C: 5.7 % (ref 4.2–6.5)

## 2025-08-13 PROCEDURE — 99215 OFFICE O/P EST HI 40 MIN: CPT | Performed by: INTERNAL MEDICINE

## 2025-08-13 PROCEDURE — 99215 OFFICE O/P EST HI 40 MIN: CPT

## 2025-08-13 PROCEDURE — G2211 COMPLEX E/M VISIT ADD ON: HCPCS | Performed by: PEDIATRICS

## 2025-08-13 PROCEDURE — 83036 HEMOGLOBIN GLYCOSYLATED A1C: CPT | Mod: QW | Performed by: PEDIATRICS

## 2025-08-13 PROCEDURE — 99214 OFFICE O/P EST MOD 30 MIN: CPT | Performed by: PEDIATRICS

## 2025-08-13 PROCEDURE — 99195 PHLEBOTOMY: CPT | Performed by: INTERNAL MEDICINE

## 2025-08-13 PROCEDURE — 87070 CULTURE OTHR SPECIMN AEROBIC: CPT | Performed by: INTERNAL MEDICINE

## 2025-08-13 PROCEDURE — 94010 BREATHING CAPACITY TEST: CPT

## 2025-08-13 SDOH — ECONOMIC STABILITY: FOOD INSECURITY: WITHIN THE PAST 12 MONTHS, THE FOOD YOU BOUGHT JUST DIDN'T LAST AND YOU DIDN'T HAVE MONEY TO GET MORE.: NEVER TRUE

## 2025-08-13 SDOH — ECONOMIC STABILITY: FOOD INSECURITY: WITHIN THE PAST 12 MONTHS, YOU WORRIED THAT YOUR FOOD WOULD RUN OUT BEFORE YOU GOT MONEY TO BUY MORE.: NEVER TRUE

## 2025-08-14 LAB
ALBUMIN/CREAT UR: NORMAL MG/G CREAT
APPEARANCE UR: CLEAR
BILIRUB UR QL STRIP: NEGATIVE
COLOR UR: YELLOW
CREAT UR-MCNC: 63 MG/DL (ref 20–320)
GLUCOSE UR QL STRIP: NEGATIVE
HGB UR QL STRIP: NEGATIVE
KETONES UR QL STRIP: NEGATIVE
LEUKOCYTE ESTERASE UR QL STRIP: NEGATIVE
MICROALBUMIN UR-MCNC: <0.2 MG/DL
NITRITE UR QL STRIP: NEGATIVE
PH UR STRIP: 6 [PH] (ref 5–8)
PROT UR QL STRIP: NEGATIVE
SP GR UR STRIP: 1.01 (ref 1–1.03)

## 2025-08-14 RX ORDER — BLOOD-GLUCOSE METER
EACH MISCELLANEOUS
Qty: 100 EACH | Refills: 11 | Status: SHIPPED | OUTPATIENT
Start: 2025-08-14

## 2025-08-14 RX ORDER — DEXTROSE 4 G
TABLET,CHEWABLE ORAL
Qty: 1 EACH | Refills: 0 | Status: SHIPPED | OUTPATIENT
Start: 2025-08-14

## 2025-08-14 RX ORDER — LANCETS 33 GAUGE
EACH MISCELLANEOUS
Qty: 100 EACH | Refills: 3 | Status: SHIPPED | OUTPATIENT
Start: 2025-08-14

## 2025-08-15 PROBLEM — R23.3 EASY BRUISING: Status: ACTIVE | Noted: 2025-08-15

## 2025-08-16 LAB
APTT PPP: 25 SEC (ref 23–32)
BACTERIA SPT CF RESP CULT: NORMAL
BASOPHILS # BLD AUTO: 77 CELLS/UL (ref 0–200)
BASOPHILS NFR BLD AUTO: 1 %
CRP SERPL-MCNC: <3 MG/L
EOSINOPHIL # BLD AUTO: 231 CELLS/UL (ref 15–500)
EOSINOPHIL NFR BLD AUTO: 3 %
ERYTHROCYTE [DISTWIDTH] IN BLOOD BY AUTOMATED COUNT: 12.9 % (ref 11–15)
HCT VFR BLD AUTO: 44.9 % (ref 38.5–50)
HGB BLD-MCNC: 14.7 G/DL (ref 13.2–17.1)
INR PPP: 1
LYMPHOCYTES # BLD AUTO: 2333 CELLS/UL (ref 850–3900)
LYMPHOCYTES NFR BLD AUTO: 30.3 %
MCH RBC QN AUTO: 32.6 PG (ref 27–33)
MCHC RBC AUTO-ENTMCNC: 32.7 G/DL (ref 32–36)
MCV RBC AUTO: 99.6 FL (ref 80–100)
MONOCYTES # BLD AUTO: 501 CELLS/UL (ref 200–950)
MONOCYTES NFR BLD AUTO: 6.5 %
NEUTROPHILS # BLD AUTO: 4558 CELLS/UL (ref 1500–7800)
NEUTROPHILS NFR BLD AUTO: 59.2 %
PHYTONADIONE SERPL-MCNC: 217 PG/ML (ref 130–1500)
PLATELET # BLD AUTO: 164 THOUSAND/UL (ref 140–400)
PMV BLD REES-ECKER: 10.7 FL (ref 7.5–12.5)
PROTHROMBIN TIME: 10.3 SEC (ref 9–11.5)
RBC # BLD AUTO: 4.51 MILLION/UL (ref 4.2–5.8)
RETINOL BIND PROT SERPL-MCNC: NORMAL MG/DL
WBC # BLD AUTO: 7.7 THOUSAND/UL (ref 3.8–10.8)
ZINC SERPL-MCNC: 62 MCG/DL (ref 60–130)

## 2025-08-18 LAB
APTT PPP: 25 SEC (ref 23–32)
BASOPHILS # BLD AUTO: 77 CELLS/UL (ref 0–200)
BASOPHILS NFR BLD AUTO: 1 %
CRP SERPL-MCNC: <3 MG/L
EOSINOPHIL # BLD AUTO: 231 CELLS/UL (ref 15–500)
EOSINOPHIL NFR BLD AUTO: 3 %
ERYTHROCYTE [DISTWIDTH] IN BLOOD BY AUTOMATED COUNT: 12.9 % (ref 11–15)
HCT VFR BLD AUTO: 44.9 % (ref 38.5–50)
HGB BLD-MCNC: 14.7 G/DL (ref 13.2–17.1)
INR PPP: 1
LYMPHOCYTES # BLD AUTO: 2333 CELLS/UL (ref 850–3900)
LYMPHOCYTES NFR BLD AUTO: 30.3 %
MCH RBC QN AUTO: 32.6 PG (ref 27–33)
MCHC RBC AUTO-ENTMCNC: 32.7 G/DL (ref 32–36)
MCV RBC AUTO: 99.6 FL (ref 80–100)
MONOCYTES # BLD AUTO: 501 CELLS/UL (ref 200–950)
MONOCYTES NFR BLD AUTO: 6.5 %
NEUTROPHILS # BLD AUTO: 4558 CELLS/UL (ref 1500–7800)
NEUTROPHILS NFR BLD AUTO: 59.2 %
PHYTONADIONE SERPL-MCNC: 217 PG/ML (ref 130–1500)
PLATELET # BLD AUTO: 164 THOUSAND/UL (ref 140–400)
PMV BLD REES-ECKER: 10.7 FL (ref 7.5–12.5)
PROTHROMBIN TIME: 10.3 SEC (ref 9–11.5)
RBC # BLD AUTO: 4.51 MILLION/UL (ref 4.2–5.8)
RETINOL BIND PROT SERPL-MCNC: 3.1 MG/DL (ref 1.5–6.7)
WBC # BLD AUTO: 7.7 THOUSAND/UL (ref 3.8–10.8)
ZINC SERPL-MCNC: 62 MCG/DL (ref 60–130)

## 2025-08-22 DIAGNOSIS — E50.9 VITAMIN A DEFICIENCY: ICD-10-CM

## 2025-08-22 DIAGNOSIS — E84.0 CYSTIC FIBROSIS WITH PULMONARY MANIFESTATIONS (MULTI): ICD-10-CM

## 2025-08-22 RX ORDER — VITAMIN A 3000 MCG
6000 CAPSULE ORAL DAILY
Qty: 60 CAPSULE | Refills: 11 | Status: SHIPPED | OUTPATIENT
Start: 2025-08-22 | End: 2026-08-22

## (undated) DEVICE — TOWEL, SURGICAL, NEURO, O/R, 16 X 26, BLUE, STERILE

## (undated) DEVICE — NEEDLE, HYPODERMIC, REGULAR WALL, REGULAR BEVEL, 22 G X 1 IN

## (undated) DEVICE — Device

## (undated) DEVICE — GUIDEWIRE, DUAL SENSOR, .035 X 150 STRAIGHT,  3CM

## (undated) DEVICE — MAT, QUICK WICK, FLUID ABSORPTION, 40X30

## (undated) DEVICE — CIRCUIT, BREATHING, ADULT, B/V FILTER, HMEF, 3 L BAG, 108 IN

## (undated) DEVICE — GLOVE, PROTEXIS PI CLASSIC, SZ-8.0, PF, PF, LF

## (undated) DEVICE — STRAP, KNEE AND BODY, SINGLE USE

## (undated) DEVICE — SUTURE, PROLENE, 0, 30 IN, CT-2, BLUE

## (undated) DEVICE — TUBING, SUCTION, CONNECTING, STERILE 0.25 X 120 IN., LF

## (undated) DEVICE — REST, HEAD, BAGEL, 9 IN

## (undated) DEVICE — BASKET, URETERAL, STONE RETRIEVAL, 4 WIRE, 2.4 FR, 120CM X 12MM, NO TIP, DISP

## (undated) DEVICE — DRESSING, GAUZE, SPONGE, 12 PLY, CURITY, 4 X 4 IN, RIGID TRAY, STERILE

## (undated) DEVICE — DRESSING, GAUZE, 16 PLY, 4 X 4 IN, STERILE

## (undated) DEVICE — CATHETER, URETERAL 5F, OPEN END

## (undated) DEVICE — MASK, FACE, ANESTHESIA, ADULT LARGE, INFL PORT, LF

## (undated) DEVICE — TUBING, SUCTION, CONNECTING, NON-CONDUCTIVE, SURE GRIP CONNECTORS, 3/16 IN X 6 FT, PVC

## (undated) DEVICE — STRAP, ARMBOARD, 3IN, BLUE/WHITE, SECURE HOOK

## (undated) DEVICE — SOLUTION, PREP, PVP IODINE, FLIP TOP, 4OZ

## (undated) DEVICE — SOLUTION, IRRIGATION, STERILE WATER, 1000 ML, POUR BOTTLE

## (undated) DEVICE — SOLUTION, IRRIGATION, USP, STERILE WATER, UROLOGICAL, 3000 ML, BAG

## (undated) DEVICE — APPLICATOR, CHLORAPREP, W/ORANGE TINT, 26ML

## (undated) DEVICE — COVER, CART, 45 X 27 X 48 IN, CLEAR

## (undated) DEVICE — MOUTHPIECE, UNIVERSAL, DISPOSABLE

## (undated) DEVICE — SUTURE, VICRYL, 2-0, 27 IN, BR/SH 27, VIOLET

## (undated) DEVICE — DRAPE, PAD, PREP, W/ 9 IN CUFF, 24 X 41, LF, NS

## (undated) DEVICE — MANIFOLD, 4 PORT NEPTUNE STANDARD

## (undated) DEVICE — DRAPE, SHEET, ENDOSCOPY, GENERAL, FENESTRATED, ARMBOARD COVER, 98 X 123.5 IN, DISPOSABLE, LF, STERILE

## (undated) DEVICE — STRIP, SKIN CLOSURE, STERI STRIP, REINFORCED, 0.5 X 4 IN

## (undated) DEVICE — SOLUTION, IRRIGATION, USP, SODIUM CHLORIDE 0.9%, 3000 ML, BAG

## (undated) DEVICE — PAD, GROUNDING, ELECTROSURGICAL, W/9 FT CABLE, POLYHESIVE II, ADULT, LF

## (undated) DEVICE — SUTURE, VICRYL, 4-0, 18 IN, UNDYED BR PS-2

## (undated) DEVICE — SUTURE, VICRYL, 3-0, 27 IN, SH, VIOLET